# Patient Record
Sex: FEMALE | Race: BLACK OR AFRICAN AMERICAN | Employment: UNEMPLOYED | URBAN - METROPOLITAN AREA
[De-identification: names, ages, dates, MRNs, and addresses within clinical notes are randomized per-mention and may not be internally consistent; named-entity substitution may affect disease eponyms.]

---

## 2021-04-12 ENCOUNTER — APPOINTMENT (OUTPATIENT)
Dept: GENERAL RADIOLOGY | Age: 56
End: 2021-04-12
Attending: PHYSICIAN ASSISTANT

## 2021-04-12 ENCOUNTER — HOSPITAL ENCOUNTER (EMERGENCY)
Age: 56
Discharge: HOME | End: 2021-04-12
Attending: EMERGENCY MEDICINE

## 2021-04-12 VITALS
HEART RATE: 102 BPM | TEMPERATURE: 97.8 F | RESPIRATION RATE: 17 BRPM | SYSTOLIC BLOOD PRESSURE: 134 MMHG | OXYGEN SATURATION: 99 % | DIASTOLIC BLOOD PRESSURE: 94 MMHG

## 2021-04-12 DIAGNOSIS — M25.559 HIP PAIN: ICD-10-CM

## 2021-04-12 DIAGNOSIS — M79.671 RIGHT FOOT PAIN: ICD-10-CM

## 2021-04-12 DIAGNOSIS — M79.641 PAIN OF RIGHT HAND: Primary | ICD-10-CM

## 2021-04-12 DIAGNOSIS — M19.90 ARTHRITIS: ICD-10-CM

## 2021-04-12 PROCEDURE — 73100 X-RAY EXAM OF WRIST: CPT

## 2021-04-12 PROCEDURE — 99281 EMR DPT VST MAYX REQ PHY/QHP: CPT

## 2021-04-12 PROCEDURE — 73502 X-RAY EXAM HIP UNI 2-3 VIEWS: CPT

## 2021-04-12 PROCEDURE — 73630 X-RAY EXAM OF FOOT: CPT

## 2021-04-12 RX ORDER — ACETAMINOPHEN 500 MG
1000 TABLET ORAL
Qty: 20 TAB | Refills: 0 | Status: SHIPPED | OUTPATIENT
Start: 2021-04-12 | End: 2021-05-30

## 2021-04-12 NOTE — ED TRIAGE NOTES
Patient presents to ED with c/o pain to right wrist, hip, and foot. Patient does have contraction to right side following a stroke in 2011. CMS intact to distal extremities.  VSS

## 2021-04-12 NOTE — ED PROVIDER NOTES
EMERGENCY DEPARTMENT HISTORY AND PHYSICAL EXAM    6:38 PM      Date: 4/12/2021  Patient Name: Darinel Strak    History of Presenting Illness     Chief Complaint   Patient presents with    Hand Pain    Hip Pain         History Provided By: Patient    Additional History (Context): Darinel Stark is a 60 y.o. female with hx of CVA and other noted PMH who presents with complaint of right wrist, hip, and foot pain x3 weeks. Patient notes episodic pain since history of CVA in 2011. Patient notes the pain is worse at night, with movement, and when it rains. Pt notes she has tried OTC medication for symptoms without relief. Denies recent fall/trauma, fever/chills, skin discoloration, swelling, numbness/tingling. Notes chronic contracture to RUE since CVA. Notes she ambulates with a cane at baseline. PCP: Dirk Garcia MD    Current Outpatient Medications   Medication Sig Dispense Refill    acetaminophen (Tylenol Extra Strength) 500 mg tablet Take 2 Tabs by mouth every six (6) hours as needed for Pain. 20 Tab 0       Past History     Past Medical History:  No past medical history on file. Past Surgical History:  No past surgical history on file. Family History:  No family history on file. Social History:  Social History     Tobacco Use    Smoking status: Not on file   Substance Use Topics    Alcohol use: Not on file    Drug use: Not on file       Allergies: Allergies   Allergen Reactions    Aggrenox [Aspirin-Dipyridamole] Swelling         Review of Systems       Review of Systems   Constitutional: Negative for chills and fever. Respiratory: Negative for shortness of breath. Cardiovascular: Negative for chest pain. Gastrointestinal: Negative for abdominal pain, nausea and vomiting. Musculoskeletal: Positive for arthralgias, gait problem (chronic) and myalgias. Skin: Negative for rash. Neurological: Negative for weakness. All other systems reviewed and are negative.         Physical Exam Visit Vitals  BP (!) 134/94 (BP 1 Location: Left upper arm, BP Patient Position: At rest)   Pulse (!) 102   Temp 97.8 °F (36.6 °C)   Resp 17   SpO2 99%         Physical Exam  Vitals signs and nursing note reviewed. Constitutional:       General: She is not in acute distress. Appearance: Normal appearance. She is well-developed. She is not ill-appearing, toxic-appearing or diaphoretic. HENT:      Head: Normocephalic and atraumatic. Neck:      Musculoskeletal: Normal range of motion and neck supple. Cardiovascular:      Rate and Rhythm: Normal rate and regular rhythm. Heart sounds: Normal heart sounds. No murmur. No friction rub. No gallop. Pulmonary:      Effort: Pulmonary effort is normal. No respiratory distress. Breath sounds: Normal breath sounds. No wheezing or rales. Musculoskeletal: Normal range of motion. Right hip: Normal.      Right ankle: Normal.      Right lower leg: Normal. No edema. Left lower leg: No edema. Right foot: Normal.      Comments: Dorsalis pedis 2+, radial pulse 2+, sensation intact throughout digits   Skin:     General: Skin is warm. Findings: No rash. Neurological:      Mental Status: She is alert and oriented to person, place, and time. Comments: Contracture of RUE with significant flexion of wrist (baseline), Ambulates with a cane (baseline)           Diagnostic Study Results     Labs -  No results found for this or any previous visit (from the past 12 hour(s)). Radiologic Studies -   XR HIP RT W OR WO PELV 2-3 VWS   Final Result   1. Osteopenia is present. No fracture or dislocation appreciated. If continued   clinical concern for occult fracture or unable to bear weight, MRI is   recommended. XR FOOT RT MIN 3 V   Final Result   1. No evidence of acute fracture or dislocation. Osteopenia is present. If   continued clinical concern, consider follow-up imaging in 10-14 days. XR WRIST RT AP/LAT   Final Result   1. Limited examination due to patient's flexion deformity. However, no gross   fracture or dislocation identified. 2.  Osteopenia is present. If continued clinical concern for occult fracture,   consider follow-up imaging in 10-14 days. Medical Decision Making   I am the first provider for this patient. I reviewed the vital signs, available nursing notes, past medical history, past surgical history, family history and social history. Vital Signs-Reviewed the patient's vital signs. Records Reviewed: Nursing Notes and Old Medical Records (Time of Review: 6:38 PM)    ED Course: Progress Notes, Reevaluation, and Consults:  6:38 PM  Reviewed results with patient. Discussed need for close outpatient follow-up this week for reassessment. Discussed strict return precautions, including swelling, discoloration, or any other medical concerns. Pt in agreement with plan, ready to go home. Provider Notes (Medical Decision Making): 57-year-old female who presents to the ED due to right wrist, hip, and foot pain x3 weeks. Patient notes episodic pain since CVA in 2011. No recent injury or trauma, numbness or tingling, swelling or discoloration. No evidence of cellulitis, septic joint, trauma, fracture. X-rays demonstrate osteopenia and arthritis. Stable for discharge with symptomatic management and close outpatient follow-up with orthopedic for further assessment. Strict return precautions provided. Diagnosis     Clinical Impression:   1. Pain of right hand    2. Hip pain    3. Right foot pain    4.  Arthritis        Disposition: home     Follow-up Information     Follow up With Specialties Details Why [address] [facility] EMERGENCY DEPT Emergency Medicine  If symptoms worsen [address] [address]  [place] 6  Schedule an appointment as soon as possible for a visit   Rosalinda Shea 3  [address] [address] [phone] Discharge Medication List as of 4/12/2021  6:33 PM      START taking these medications    Details   acetaminophen (Tylenol Extra Strength) 500 mg tablet Take 2 Tabs by mouth every six (6) hours as needed for Pain., Normal, Disp-20 Tab, R-0             Dictation disclaimer:  Please note that this dictation was completed with [employer], the computer voice recognition software. Quite often unanticipated grammatical, syntax, homophones, and other interpretive errors are inadvertently transcribed by the computer software. Please disregard these errors. Please excuse any errors that have escaped final proofreading.

## 2021-04-12 NOTE — DISCHARGE INSTRUCTIONS
Take medication as prescribed. Follow-up with your primary care physician within 2 days for reassessment. Bring the results from this visit with you for their review. Return to the ED immediately for any new, worsening, or persistent symptoms.

## 2021-05-30 ENCOUNTER — APPOINTMENT (OUTPATIENT)
Dept: GENERAL RADIOLOGY | Age: 56
DRG: 058 | End: 2021-05-30
Attending: HOSPITALIST
Payer: MEDICAID

## 2021-05-30 ENCOUNTER — HOSPITAL ENCOUNTER (INPATIENT)
Age: 56
LOS: 1 days | Discharge: HOME | DRG: 058 | End: 2021-05-31
Attending: EMERGENCY MEDICINE | Admitting: FAMILY MEDICINE
Payer: MEDICAID

## 2021-05-30 ENCOUNTER — APPOINTMENT (OUTPATIENT)
Dept: CT IMAGING | Age: 56
DRG: 058 | End: 2021-05-30
Attending: EMERGENCY MEDICINE
Payer: MEDICAID

## 2021-05-30 ENCOUNTER — APPOINTMENT (OUTPATIENT)
Dept: GENERAL RADIOLOGY | Age: 56
DRG: 058 | End: 2021-05-30
Attending: EMERGENCY MEDICINE
Payer: MEDICAID

## 2021-05-30 ENCOUNTER — APPOINTMENT (OUTPATIENT)
Dept: MRI IMAGING | Age: 56
DRG: 058 | End: 2021-05-30
Attending: NURSE PRACTITIONER
Payer: MEDICAID

## 2021-05-30 DIAGNOSIS — R47.01 EXPRESSIVE APHASIA: Primary | ICD-10-CM

## 2021-05-30 LAB
ALBUMIN SERPL-MCNC: 3.3 G/DL (ref 3.4–5)
ALBUMIN/GLOB SERPL: 0.6 {RATIO} (ref 0.8–1.7)
ALP SERPL-CCNC: 88 U/L (ref 45–117)
ALT SERPL-CCNC: 17 U/L (ref 13–56)
ANION GAP SERPL CALC-SCNC: 7 MMOL/L (ref 3–18)
AST SERPL-CCNC: 39 U/L (ref 10–38)
BASOPHILS # BLD: 0 K/UL (ref 0–0.1)
BASOPHILS NFR BLD: 0 % (ref 0–2)
BILIRUB SERPL-MCNC: 0.4 MG/DL (ref 0.2–1)
BUN SERPL-MCNC: 4 MG/DL (ref 7–18)
BUN/CREAT SERPL: 6 (ref 12–20)
CALCIUM SERPL-MCNC: 8.2 MG/DL (ref 8.5–10.1)
CHLORIDE SERPL-SCNC: 106 MMOL/L (ref 100–111)
CHOLEST SERPL-MCNC: 115 MG/DL
CO2 SERPL-SCNC: 26 MMOL/L (ref 21–32)
CREAT SERPL-MCNC: 0.71 MG/DL (ref 0.6–1.3)
DIFFERENTIAL METHOD BLD: ABNORMAL
EOSINOPHIL # BLD: 0.1 K/UL (ref 0–0.4)
EOSINOPHIL NFR BLD: 1 % (ref 0–5)
ERYTHROCYTE [DISTWIDTH] IN BLOOD BY AUTOMATED COUNT: 14.6 % (ref 11.6–14.5)
EST. AVERAGE GLUCOSE BLD GHB EST-MCNC: 117 MG/DL
GLOBULIN SER CALC-MCNC: 5.7 G/DL (ref 2–4)
GLUCOSE BLD STRIP.AUTO-MCNC: 140 MG/DL (ref 70–110)
GLUCOSE SERPL-MCNC: 116 MG/DL (ref 74–99)
HBA1C MFR BLD: 5.7 % (ref 4.2–5.6)
HCT VFR BLD AUTO: 36.3 % (ref 35–45)
HDLC SERPL-MCNC: 33 MG/DL (ref 40–60)
HDLC SERPL: 3.5 {RATIO} (ref 0–5)
HGB BLD-MCNC: 11.3 G/DL (ref 12–16)
INR PPP: 0.9 (ref 0.8–1.2)
LDLC SERPL CALC-MCNC: 57 MG/DL (ref 0–100)
LIPID PROFILE,FLP: ABNORMAL
LYMPHOCYTES # BLD: 4 K/UL (ref 0.9–3.6)
LYMPHOCYTES NFR BLD: 51 % (ref 21–52)
MCH RBC QN AUTO: 24.9 PG (ref 24–34)
MCHC RBC AUTO-ENTMCNC: 31.1 G/DL (ref 31–37)
MCV RBC AUTO: 80 FL (ref 74–97)
MONOCYTES # BLD: 0.6 K/UL (ref 0.05–1.2)
MONOCYTES NFR BLD: 8 % (ref 3–10)
NEUTS SEG # BLD: 3.1 K/UL (ref 1.8–8)
NEUTS SEG NFR BLD: 40 % (ref 40–73)
PLATELET # BLD AUTO: 393 K/UL (ref 135–420)
PMV BLD AUTO: 8.7 FL (ref 9.2–11.8)
POTASSIUM SERPL-SCNC: 4.1 MMOL/L (ref 3.5–5.5)
PROT SERPL-MCNC: 9 G/DL (ref 6.4–8.2)
PROTHROMBIN TIME: 12.5 SEC (ref 11.5–15.2)
RBC # BLD AUTO: 4.54 M/UL (ref 4.2–5.3)
SODIUM SERPL-SCNC: 139 MMOL/L (ref 136–145)
TRIGL SERPL-MCNC: 125 MG/DL (ref ?–150)
VLDLC SERPL CALC-MCNC: 25 MG/DL
WBC # BLD AUTO: 7.9 K/UL (ref 4.6–13.2)

## 2021-05-30 PROCEDURE — 82962 GLUCOSE BLOOD TEST: CPT

## 2021-05-30 PROCEDURE — 85610 PROTHROMBIN TIME: CPT

## 2021-05-30 PROCEDURE — 83036 HEMOGLOBIN GLYCOSYLATED A1C: CPT

## 2021-05-30 PROCEDURE — 74019 RADEX ABDOMEN 2 VIEWS: CPT

## 2021-05-30 PROCEDURE — 71045 X-RAY EXAM CHEST 1 VIEW: CPT

## 2021-05-30 PROCEDURE — 93005 ELECTROCARDIOGRAM TRACING: CPT

## 2021-05-30 PROCEDURE — 74011250636 HC RX REV CODE- 250/636: Performed by: EMERGENCY MEDICINE

## 2021-05-30 PROCEDURE — 99285 EMERGENCY DEPT VISIT HI MDM: CPT

## 2021-05-30 PROCEDURE — 94761 N-INVAS EAR/PLS OXIMETRY MLT: CPT

## 2021-05-30 PROCEDURE — 70551 MRI BRAIN STEM W/O DYE: CPT

## 2021-05-30 PROCEDURE — APPSS60 APP SPLIT SHARED TIME 46-60 MINUTES: Performed by: NURSE PRACTITIONER

## 2021-05-30 PROCEDURE — 65270000029 HC RM PRIVATE

## 2021-05-30 PROCEDURE — 70030 X-RAY EYE FOR FOREIGN BODY: CPT

## 2021-05-30 PROCEDURE — 80061 LIPID PANEL: CPT

## 2021-05-30 PROCEDURE — 85025 COMPLETE CBC W/AUTO DIFF WBC: CPT

## 2021-05-30 PROCEDURE — 99223 1ST HOSP IP/OBS HIGH 75: CPT | Performed by: NURSE PRACTITIONER

## 2021-05-30 PROCEDURE — 80053 COMPREHEN METABOLIC PANEL: CPT

## 2021-05-30 PROCEDURE — 80177 DRUG SCRN QUAN LEVETIRACETAM: CPT

## 2021-05-30 PROCEDURE — 70450 CT HEAD/BRAIN W/O DYE: CPT

## 2021-05-30 RX ORDER — POLYETHYLENE GLYCOL 3350 17 G/17G
17 POWDER, FOR SOLUTION ORAL DAILY PRN
Status: DISCONTINUED | OUTPATIENT
Start: 2021-05-30 | End: 2021-05-31 | Stop reason: HOSPADM

## 2021-05-30 RX ORDER — SODIUM CHLORIDE 0.9 % (FLUSH) 0.9 %
5-40 SYRINGE (ML) INJECTION AS NEEDED
Status: DISCONTINUED | OUTPATIENT
Start: 2021-05-30 | End: 2021-05-31 | Stop reason: HOSPADM

## 2021-05-30 RX ORDER — SODIUM CHLORIDE 0.9 % (FLUSH) 0.9 %
5-40 SYRINGE (ML) INJECTION EVERY 8 HOURS
Status: DISCONTINUED | OUTPATIENT
Start: 2021-05-30 | End: 2021-05-31 | Stop reason: HOSPADM

## 2021-05-30 RX ORDER — FAMOTIDINE 20 MG/1
20 TABLET, FILM COATED ORAL DAILY
Status: DISCONTINUED | OUTPATIENT
Start: 2021-05-31 | End: 2021-05-31 | Stop reason: HOSPADM

## 2021-05-30 RX ORDER — LEVETIRACETAM 10 MG/ML
1000 INJECTION INTRAVASCULAR EVERY 12 HOURS
Status: DISCONTINUED | OUTPATIENT
Start: 2021-05-30 | End: 2021-05-31 | Stop reason: HOSPADM

## 2021-05-30 RX ORDER — LANOLIN ALCOHOL/MO/W.PET/CERES
65 CREAM (GRAM) TOPICAL EVERY OTHER DAY
Status: ON HOLD | COMMUNITY
End: 2021-05-31 | Stop reason: SDUPTHER

## 2021-05-30 RX ORDER — ATORVASTATIN CALCIUM 40 MG/1
40 TABLET, FILM COATED ORAL DAILY
Status: ON HOLD | COMMUNITY
End: 2021-05-31 | Stop reason: SDUPTHER

## 2021-05-30 RX ORDER — CLOPIDOGREL BISULFATE 75 MG/1
75 TABLET ORAL DAILY
Status: ON HOLD | COMMUNITY
End: 2021-05-31 | Stop reason: SDUPTHER

## 2021-05-30 RX ORDER — CLOPIDOGREL BISULFATE 75 MG/1
75 TABLET ORAL DAILY
Status: DISCONTINUED | OUTPATIENT
Start: 2021-05-31 | End: 2021-05-31 | Stop reason: HOSPADM

## 2021-05-30 RX ORDER — ONDANSETRON 2 MG/ML
4 INJECTION INTRAMUSCULAR; INTRAVENOUS
Status: DISCONTINUED | OUTPATIENT
Start: 2021-05-30 | End: 2021-05-31 | Stop reason: HOSPADM

## 2021-05-30 RX ORDER — ASPIRIN 81 MG/1
81 TABLET ORAL DAILY
Status: ON HOLD | COMMUNITY
End: 2021-05-31 | Stop reason: SDUPTHER

## 2021-05-30 RX ORDER — GUAIFENESIN 100 MG/5ML
81 LIQUID (ML) ORAL DAILY
Status: DISCONTINUED | OUTPATIENT
Start: 2021-05-31 | End: 2021-05-31 | Stop reason: HOSPADM

## 2021-05-30 RX ORDER — ACETAMINOPHEN 325 MG/1
650 TABLET ORAL
Status: DISCONTINUED | OUTPATIENT
Start: 2021-05-30 | End: 2021-05-31 | Stop reason: HOSPADM

## 2021-05-30 RX ORDER — LISINOPRIL 20 MG/1
20 TABLET ORAL DAILY
Status: ON HOLD | COMMUNITY
End: 2021-05-31 | Stop reason: SDUPTHER

## 2021-05-30 RX ORDER — DOCUSATE SODIUM 100 MG/1
100 CAPSULE, LIQUID FILLED ORAL DAILY
Status: DISCONTINUED | OUTPATIENT
Start: 2021-05-31 | End: 2021-05-31 | Stop reason: HOSPADM

## 2021-05-30 RX ORDER — AMLODIPINE BESYLATE 10 MG/1
TABLET ORAL DAILY
Status: ON HOLD | COMMUNITY
End: 2021-05-31 | Stop reason: SDUPTHER

## 2021-05-30 RX ORDER — PROMETHAZINE HYDROCHLORIDE 12.5 MG/1
12.5 TABLET ORAL
Status: DISCONTINUED | OUTPATIENT
Start: 2021-05-30 | End: 2021-05-31 | Stop reason: HOSPADM

## 2021-05-30 RX ORDER — ACETAMINOPHEN 650 MG/1
650 SUPPOSITORY RECTAL
Status: DISCONTINUED | OUTPATIENT
Start: 2021-05-30 | End: 2021-05-31 | Stop reason: HOSPADM

## 2021-05-30 RX ORDER — ATORVASTATIN CALCIUM 40 MG/1
40 TABLET, FILM COATED ORAL
Status: DISCONTINUED | OUTPATIENT
Start: 2021-05-30 | End: 2021-05-31 | Stop reason: HOSPADM

## 2021-05-30 RX ORDER — ENOXAPARIN SODIUM 100 MG/ML
40 INJECTION SUBCUTANEOUS EVERY 24 HOURS
Status: DISCONTINUED | OUTPATIENT
Start: 2021-05-30 | End: 2021-05-31 | Stop reason: HOSPADM

## 2021-05-30 RX ORDER — LEVETIRACETAM 500 MG/1
500 TABLET ORAL 2 TIMES DAILY
Status: ON HOLD | COMMUNITY
End: 2021-05-31 | Stop reason: SDUPTHER

## 2021-05-30 RX ADMIN — LEVETIRACETAM 1000 MG: 1000 INJECTION, SOLUTION INTRAVENOUS at 18:11

## 2021-05-30 NOTE — Clinical Note
Status[de-identified] INPATIENT [101]   Type of Bed: Neuro/Stroke [9]   Cardiac Monitoring Required?: No   Inpatient Hospitalization Certified Necessary for the Following Reasons: 3.  Patient receiving treatment that can only be provided in an inpatient setting (further clarification in H&P documentation)   Admitting Diagnosis: Expressive aphasia [278112]   Admitting Physician: Gio Hdez [[id number]]   Attending Physician: Gio Hdez [[id number]]   Estimated Length of Stay: 2 Midnights   Discharge Plan[de-identified] Home with Office Follow-up

## 2021-05-30 NOTE — ED PROVIDER NOTES
EMERGENCY DEPARTMENT HISTORY AND PHYSICAL EXAM  This was created with voice recognition software and transcription errors may be present. 4:43 PM  Date: 5/30/2021  Patient Name: Mai Barnes    History of Presenting Illness     Chief Complaint:    History Provided By:     HPI: Mai Barnes is a 60 y.o. female past medical history of stroke who presents for choking. Patient had a stroke is unclear exactly when based on chart review but comes in with a history of hemiparesis and had an episode of choking on Jell-O earlier today. Patient reportedly speaks clearly and since the choking episode has had significantly slurred speech. No other secondary signs of stroke no blurred vision or coordination problems. She states she does not feel like something is stuck in her throat but states she is having trouble getting her words out. Per chart review stroke was in 2011    PCP: Jose, MD Dirk      Past History     Past Medical History:  No past medical history on file. Past Surgical History:  No past surgical history on file. Family History:  No family history on file. Social History:  Social History     Tobacco Use    Smoking status: Not on file   Substance Use Topics    Alcohol use: Not on file    Drug use: Not on file       Allergies: Allergies   Allergen Reactions    Aggrenox [Aspirin-Dipyridamole] Swelling       Review of Systems     Review of Systems   Eyes: Negative for pain. Respiratory: Negative for apnea. All other systems reviewed and are negative. 10 point review of systems otherwise negative unless noted in HPI. Physical Exam       Physical Exam  Constitutional:       Appearance: She is well-developed. HENT:      Head: Normocephalic. Mouth/Throat:      Mouth: Mucous membranes are moist.   Eyes:      Conjunctiva/sclera: Conjunctivae normal.   Cardiovascular:      Rate and Rhythm: Normal rate. Heart sounds: Normal heart sounds.    Abdominal:      Palpations: Abdomen is soft. Musculoskeletal:      Cervical back: Normal range of motion. Skin:     General: Skin is warm. Neurological:      Comments: Patient with right-sided hemiparesis with a right upper extremity contracture. Her speech is slurred although not cranial nerves grossly intact coordination grossly intact on the left strength 5 out of 5 on the left         Diagnostic Study Results     Vital Signs   Visit Vitals  BP (!) 148/81   Pulse (!) 105   Temp 97.8 °F (36.6 °C)   Resp 20   Ht 5' 6\" (1.676 m)   Wt 77.1 kg (170 lb)   SpO2 98%   BMI 27.44 kg/m²      EKG: Ending  Labs: CBC is unremarkable INR is 0.9 chemistry unremarkable  Imaging: CT head with encephalomalacia  Chest x-ray no acute pulmonary disease  Medical Decision Making     ED Course: Progress Notes, Reevaluation, and Consults:    I will be the provider of record for this patient. Provider Notes (Medical Decision Making): This is a 49-year-old female who presents with choking etiology is unclear initially it sounded like a general complaint however as her speech is not slurred following the event may in fact be a stroke. She does appear to be on Aggrenox per chart review additionally last known well time is not known but per son's report from nursing she was speaking fine this morning. Will discuss with teleneurology is slightly unusual that there will be no secondary effects. She is noted to be tachycardic    5:35 PM about the last 30 minutes to 45 minutes trying to figure out patient's history. We have called the son is on his way and he should be here shortly. She states she is on blood thinners but were not sure exactly which ones. She does not know. Son states she did not have a hemorrhagic stroke in the past.  Her onset time seems to be around  3:00. Currently working with neurology to try to determine if patient is eligible for TPA.     Son arrived and he did not have a med list on him however I do he was able to give me the pharmacy number where they filled her prescriptions in [facility]. I called the pharmacy in [facility] and found out that she was on Plavix and she had it filled in March in addition she was on a statin she had that filled in February but she is not on any other blood thinners. We discussed this further with the son who states this is probably happened 2 or 3 times in the past patient states this is probably actually happened 3 times in the past and each time she gets better the following morning given this we discussed the risk of benefit and harm of TPA with the patient and her son she has expressive aphasia but   . Clearly understands are speaking and is able to answer in writing and given this we will hold on the TPA at this time. Critical Care Time:  The services I provided to this patient were to treat and/or prevent clinically significant deterioration that could result in the failure of one or more body systems and/or organ systems due to CVA. Services included the following:  -reviewing nursing notes and old charts  -vital sign assessments  -direct patient care  -medication orders and management  -interpreting and reviewing diagnostic studies/labs  -re-evaluations  -documentation time    Aggregate critical care time was 60 minutes, which includes only time during which I was engaged in work directly related to the patient's care as described above, whether I was at bedside or elsewhere in the Emergency Department. It did not include time spent performing other reported procedures or the services of residents, students, nurses, or advance practice providers. Alicia Boateng MD    5:56 PM      Diagnosis     Clinical Impression: No diagnosis found. Disposition:        Patient's Medications   Start Taking    No medications on file   Continue Taking    ACETAMINOPHEN (TYLENOL EXTRA STRENGTH) 500 MG TABLET    Take 2 Tabs by mouth every six (6) hours as needed for Pain.    These Medications have changed    No medications on file   Stop Taking    No medications on file

## 2021-05-30 NOTE — ED TRIAGE NOTES
Per medic: Son called stating patient was eating jello and started choking on it and is now complaining of a sore throat. Yuniel Angry Upon arrival to the ER patient does not appear to be in any distress.

## 2021-05-30 NOTE — ED NOTES
Pt speech noted to be abnormal. Per medic: \"Patient communicates normally as we would, patient normally talks in full, clear sentences.  She was talking in the ambulance\"

## 2021-05-30 NOTE — H&P
History & Physical    Patient: Tanja Gutierrez MRN: [id number]  CSN: [id number]    YOB: 1965  Age: 60 y.o. Sex: female      DOA: 5/30/2021  CC: choking     PCP: Dirk Garcia MD       HPI:     Tanja Gutierrez is a 60 y.o. female who presents with chocking today while eating jello. After chocking complained of sore throat. Slurred speech noted after episode. History of stroke, Plavix/statin/ASA and right side hemiparesis. History of seizures. Currently aphasia, she is having difficulties with forming her words. Complains of constipation. She does not know the name of her medications. Family is at the bedside. He was able to provide the pharmacy contact information. Just relocated from [facility]. ROS: alert and oriented x3. Aphasia   GENERAL: no weight loss, no falls. HEENT: chocking and difficulty swallowing    NECK: No pain or stiffness. PULMONARY: No shortness of breath, no cough or wheeze. Cardiovascular: no pnd or orthopnea, no CP  GASTROINTESTINAL: right side abdominal pain, constipation GENITOURINARY: No urinary frequency, no urgency, no hesitancy or dysuria. MUSCULOSKELETAL: right side weakness  DERMATOLOGIC: No rash, no itching, no lesions. ENDOCRINE: No polyuria, no polydipsia, no heat or cold intolerance. No recent change in weight. HEMATOLOGICAL: No anemia or easy bruising or bleeding. NEUROLOGIC: No headache, no seizures, no numbness, no tingling or weakness. Past Medical History:   Diagnosis Date    [facility]     HLD (hyperlipidemia)     HTN (hypertension)     Right hemiparesis ([facility]     Seizure ([facility]        No past surgical history on file. No family history on file.     Social History     Socioeconomic History    Marital status: SINGLE     Spouse name: Not on file    Number of children: Not on file    Years of education: Not on file    Highest education level: Not on file     Social Determinants of Health Financial Resource Strain:     Difficulty of Paying Living Expenses:    Food Insecurity:     Worried About Running Out of Food in the Last Year:     [address] in the Last Year:    Transportation Needs:     Lack of Transportation (Medical):  Lack of Transportation (Non-Medical):    Physical Activity:     Days of Exercise per Week:     Minutes of Exercise per Session:    Stress:     Feeling of Stress :    Social Connections:     Frequency of Communication with Friends and Family:     Frequency of Social Gatherings with Friends and Family:     Attends Congregation Services:     Active Member of Clubs or Organizations:     Attends Club or Organization Meetings:     Marital Status:        Prior to Admission medications    Medication Sig Start Date End Date Taking? Authorizing Provider   clopidogreL (Plavix) 75 mg tab Take 75 mg by mouth daily. Yes Provider, Historical   atorvastatin (LIPITOR) 40 mg tablet Take 40 mg by mouth daily. Yes Provider, Historical   lisinopriL (PRINIVIL, ZESTRIL) 20 mg tablet Take 20 mg by mouth daily. Yes Provider, Historical   amLODIPine (NORVASC) 10 mg tablet Take  by mouth daily. Yes Provider, Historical   ferrous sulfate 325 mg (65 mg iron) tablet Take 65 mg by mouth every other day. Yes Provider, Historical   levETIRAcetam (Keppra) 500 mg tablet Take 500 mg by mouth two (2) times a day. Yes Provider, Historical   aspirin delayed-release 81 mg tablet Take 81 mg by mouth daily.    Yes Provider, Historical       Allergies   Allergen Reactions    Aggrenox [Aspirin-Dipyridamole] Swelling              Physical Exam:      Visit Vitals  /87   Pulse 91   Temp 97.8 °F (36.6 °C)   Resp 23   Ht 5' 6\" (1.676 m)   Wt 77.1 kg (170 lb)   SpO2 100%   BMI 27.44 kg/m²       Physical Exam:  General:  Alert, NAD  Cardiovascular:  Tachycardia   Pulmonary:  LSC throughout; respiratory effort WNL  GI: Right side tender on palpation    Extremities: right side hemiparesis No edema; 2+ dorsalis pedis pulses bilaterally  Neuro: alert and oriented x3, aphasia, slurred speech     Lab/Data Review:  Labs: Results:       Chemistry Recent Labs     05/30/21  1644   *      K 4.1      CO2 26   BUN 4*   CREA 0.71   CA 8.2*   AGAP 7   BUCR 6*   AP 88   TP 9.0*   ALB 3.3*   GLOB 5.7*   AGRAT 0.6*      CBC w/Diff Recent Labs     05/30/21  1644   WBC 7.9   RBC 4.54   HGB 11.3*   HCT 36.3      GRANS 40   LYMPH 51   EOS 1      Coagulation Recent Labs     05/30/21  1644   PTP 12.5   INR 0.9       Iron/Ferritin No results for input(s): IRON in the last 72 hours. No lab exists for component: TIBCCALC   BNP No results for input(s): BNPP in the last 72 hours. Cardiac Enzymes No results for input(s): CPK, CKND1, DAQUAN in the last 72 hours. No lab exists for component: CKRMB, TROIP   Liver Enzymes Recent Labs     05/30/21  1644   TP 9.0*   ALB 3.3*   AP 88      Thyroid Studies No results found for: T4, T3U, TSH, TSHEXT, TSHEXT       All Micro Results     None          Imaging Reviewed:  CT Results (most recent):  Results from Hospital Encounter encounter on 05/30/21    CT HEAD WO CONT    Narrative  NONCONTRAST HEAD CT    INDICATION: Above. Stroke alert protocol head CT. Slurred speech and weakness. History of CVA. COMPARISON: No priors available in PACS    TECHNIQUE: Serial axial CT images through the brain were obtained without  administration of intravenous contrast. Additional coronal and sagittal  reformation images were also performed. All CT scans at this facility are performed using dose optimization technique as  appropriate to the performed exam, to include automated exposure control,  adjustment of the mA and/or kV according to patient's size (Including  appropriate matching for site-specific examinations), or use of iterative  reconstruction technique.     FINDINGS:    Large region of encephalomalacia in the left cerebrum centered in the  perisylvian region consistent with chronic infarct in the MCA territory. Mild  associated ex vacuo dilatation of the left lateral ventricle. The sulci and ventricles otherwise are within normal limits for age in size and  configuration. There is no evidence of acute intracranial hemorrhage. No edema, midline shift or other mass effect is seen. No mass lesion  identified. No evidence of acute ischemic stroke/ cerebrovascular accident (CVA) is  identified. Head CT is often insensitive early to acute ischemic stroke. The visualized portions of the paranasal sinuses demonstrate no significant  mucosal pathology. The mastoid air cells are aerated  The calvarium appears  intact. Impression  No CT evidence of acute intracranial pathology. Large region of encephalomalacia in the left cerebrum consistent with chronic  infarct in the MCA territory. I have telephoned a wet reading directly to Dr. Devang Quigley  at 5:07 PM on 5/30/2021. Assessment:   Active Problems:    Expressive aphasia (5/30/2021)    Dysphagia     Past medical history   Seizure  HTN  HLD  CVA  Right side hemiparesis       Plan:   1. Stroke protocol initiated. CT head completed. MRI head ordered. Continue ASA/Plavix/Statin. Hold BP medications. Permissive BP. Neurology consult  2. In ER Keppra IV ordered. Continue IV Keppra. Transition to PO home dose once she passes swallow eval. Seizure precautions   3. Pt/OT/Speech  4. Monitor metabolic panel and renal function   5. Bowel regimen   6. [facility] [phone]. Called to verify medication list and updated med rec  7. Full code  8.  Designated  theron Bryson [phone]            CELY [id number], ABBIE  5/30/2021, 6:58 PM

## 2021-05-31 VITALS
HEIGHT: 66 IN | WEIGHT: 169.97 LBS | BODY MASS INDEX: 27.32 KG/M2 | DIASTOLIC BLOOD PRESSURE: 87 MMHG | HEART RATE: 85 BPM | SYSTOLIC BLOOD PRESSURE: 124 MMHG | TEMPERATURE: 98.4 F | RESPIRATION RATE: 18 BRPM | OXYGEN SATURATION: 96 %

## 2021-05-31 LAB
ALBUMIN SERPL-MCNC: 3.2 G/DL (ref 3.4–5)
ALBUMIN/GLOB SERPL: 0.6 {RATIO} (ref 0.8–1.7)
ALP SERPL-CCNC: 79 U/L (ref 45–117)
ALT SERPL-CCNC: 15 U/L (ref 13–56)
ANION GAP SERPL CALC-SCNC: 5 MMOL/L (ref 3–18)
AST SERPL-CCNC: 23 U/L (ref 10–38)
ATRIAL RATE: 92 BPM
BASOPHILS # BLD: 0 K/UL (ref 0–0.1)
BASOPHILS NFR BLD: 0 % (ref 0–2)
BILIRUB SERPL-MCNC: 0.7 MG/DL (ref 0.2–1)
BUN SERPL-MCNC: 4 MG/DL (ref 7–18)
BUN/CREAT SERPL: 7 (ref 12–20)
CALCIUM SERPL-MCNC: 8.8 MG/DL (ref 8.5–10.1)
CALCULATED P AXIS, ECG09: 41 DEGREES
CALCULATED R AXIS, ECG10: 17 DEGREES
CALCULATED T AXIS, ECG11: 45 DEGREES
CHLORIDE SERPL-SCNC: 104 MMOL/L (ref 100–111)
CO2 SERPL-SCNC: 27 MMOL/L (ref 21–32)
CREAT SERPL-MCNC: 0.58 MG/DL (ref 0.6–1.3)
DIAGNOSIS, 93000: NORMAL
DIFFERENTIAL METHOD BLD: ABNORMAL
EOSINOPHIL # BLD: 0.1 K/UL (ref 0–0.4)
EOSINOPHIL NFR BLD: 1 % (ref 0–5)
ERYTHROCYTE [DISTWIDTH] IN BLOOD BY AUTOMATED COUNT: 14.6 % (ref 11.6–14.5)
GLOBULIN SER CALC-MCNC: 5.6 G/DL (ref 2–4)
GLUCOSE BLD STRIP.AUTO-MCNC: 87 MG/DL (ref 70–110)
GLUCOSE SERPL-MCNC: 85 MG/DL (ref 74–99)
HCT VFR BLD AUTO: 34.3 % (ref 35–45)
HGB BLD-MCNC: 10.7 G/DL (ref 12–16)
LYMPHOCYTES # BLD: 3.6 K/UL (ref 0.9–3.6)
LYMPHOCYTES NFR BLD: 47 % (ref 21–52)
MCH RBC QN AUTO: 25.1 PG (ref 24–34)
MCHC RBC AUTO-ENTMCNC: 31.2 G/DL (ref 31–37)
MCV RBC AUTO: 80.3 FL (ref 74–97)
MONOCYTES # BLD: 0.7 K/UL (ref 0.05–1.2)
MONOCYTES NFR BLD: 10 % (ref 3–10)
NEUTS SEG # BLD: 3.1 K/UL (ref 1.8–8)
NEUTS SEG NFR BLD: 41 % (ref 40–73)
P-R INTERVAL, ECG05: 190 MS
PLATELET # BLD AUTO: 361 K/UL (ref 135–420)
PMV BLD AUTO: 9.1 FL (ref 9.2–11.8)
POTASSIUM SERPL-SCNC: 3.4 MMOL/L (ref 3.5–5.5)
PROT SERPL-MCNC: 8.8 G/DL (ref 6.4–8.2)
Q-T INTERVAL, ECG07: 390 MS
QRS DURATION, ECG06: 72 MS
QTC CALCULATION (BEZET), ECG08: 482 MS
RBC # BLD AUTO: 4.27 M/UL (ref 4.2–5.3)
SODIUM SERPL-SCNC: 136 MMOL/L (ref 136–145)
VENTRICULAR RATE, ECG03: 92 BPM
WBC # BLD AUTO: 7.5 K/UL (ref 4.6–13.2)

## 2021-05-31 PROCEDURE — 99238 HOSP IP/OBS DSCHRG MGMT 30/<: CPT | Performed by: FAMILY MEDICINE

## 2021-05-31 PROCEDURE — 2709999900 HC NON-CHARGEABLE SUPPLY

## 2021-05-31 PROCEDURE — 74011250636 HC RX REV CODE- 250/636: Performed by: EMERGENCY MEDICINE

## 2021-05-31 PROCEDURE — 92522 EVALUATE SPEECH PRODUCTION: CPT

## 2021-05-31 PROCEDURE — 80053 COMPREHEN METABOLIC PANEL: CPT

## 2021-05-31 PROCEDURE — 36415 COLL VENOUS BLD VENIPUNCTURE: CPT

## 2021-05-31 PROCEDURE — 74011250637 HC RX REV CODE- 250/637: Performed by: NURSE PRACTITIONER

## 2021-05-31 PROCEDURE — 85025 COMPLETE CBC W/AUTO DIFF WBC: CPT

## 2021-05-31 PROCEDURE — 99223 1ST HOSP IP/OBS HIGH 75: CPT | Performed by: PSYCHIATRY & NEUROLOGY

## 2021-05-31 PROCEDURE — 92610 EVALUATE SWALLOWING FUNCTION: CPT

## 2021-05-31 PROCEDURE — 97535 SELF CARE MNGMENT TRAINING: CPT

## 2021-05-31 PROCEDURE — 97161 PT EVAL LOW COMPLEX 20 MIN: CPT

## 2021-05-31 PROCEDURE — 97165 OT EVAL LOW COMPLEX 30 MIN: CPT

## 2021-05-31 PROCEDURE — 74011250636 HC RX REV CODE- 250/636: Performed by: NURSE PRACTITIONER

## 2021-05-31 PROCEDURE — 82962 GLUCOSE BLOOD TEST: CPT

## 2021-05-31 PROCEDURE — 74011250637 HC RX REV CODE- 250/637: Performed by: FAMILY MEDICINE

## 2021-05-31 RX ORDER — LEVETIRACETAM 500 MG/1
500 TABLET ORAL 2 TIMES DAILY
Qty: 60 TABLET | Refills: 0 | Status: SHIPPED | OUTPATIENT
Start: 2021-05-31 | End: 2021-06-28 | Stop reason: SDUPTHER

## 2021-05-31 RX ORDER — ASPIRIN 81 MG/1
81 TABLET ORAL DAILY
Qty: 30 TABLET | Refills: 0 | Status: SHIPPED | OUTPATIENT
Start: 2021-05-31 | End: 2021-06-28 | Stop reason: SDUPTHER

## 2021-05-31 RX ORDER — POTASSIUM CHLORIDE 1500 MG/1
20 TABLET, FILM COATED, EXTENDED RELEASE ORAL 2 TIMES DAILY
Qty: 10 TABLET | Refills: 0 | Status: SHIPPED | OUTPATIENT
Start: 2021-05-31 | End: 2021-06-05

## 2021-05-31 RX ORDER — CLOPIDOGREL BISULFATE 75 MG/1
75 TABLET ORAL DAILY
Qty: 30 TABLET | Refills: 0 | Status: SHIPPED | OUTPATIENT
Start: 2021-05-31 | End: 2021-06-28 | Stop reason: SDUPTHER

## 2021-05-31 RX ORDER — LANOLIN ALCOHOL/MO/W.PET/CERES
325 CREAM (GRAM) TOPICAL EVERY OTHER DAY
Qty: 30 TABLET | Refills: 0 | Status: SHIPPED | OUTPATIENT
Start: 2021-05-31 | End: 2021-06-28 | Stop reason: SDUPTHER

## 2021-05-31 RX ORDER — LISINOPRIL 20 MG/1
20 TABLET ORAL DAILY
Qty: 30 TABLET | Refills: 0 | Status: SHIPPED | OUTPATIENT
Start: 2021-05-31 | End: 2021-06-28 | Stop reason: SDUPTHER

## 2021-05-31 RX ORDER — ATORVASTATIN CALCIUM 40 MG/1
40 TABLET, FILM COATED ORAL DAILY
Qty: 30 TABLET | Refills: 0 | Status: SHIPPED | OUTPATIENT
Start: 2021-05-31 | End: 2021-06-28 | Stop reason: SDUPTHER

## 2021-05-31 RX ORDER — AMLODIPINE BESYLATE 10 MG/1
10 TABLET ORAL DAILY
Qty: 30 TABLET | Refills: 0 | Status: SHIPPED | OUTPATIENT
Start: 2021-05-31 | End: 2021-06-28 | Stop reason: SDUPTHER

## 2021-05-31 RX ADMIN — ATORVASTATIN CALCIUM 40 MG: 40 TABLET, FILM COATED ORAL at 00:40

## 2021-05-31 RX ADMIN — ENOXAPARIN SODIUM 40 MG: 40 INJECTION SUBCUTANEOUS at 00:40

## 2021-05-31 RX ADMIN — LEVETIRACETAM 1000 MG: 1000 INJECTION, SOLUTION INTRAVENOUS at 06:21

## 2021-05-31 RX ADMIN — Medication 10 ML: at 06:22

## 2021-05-31 RX ADMIN — FAMOTIDINE 20 MG: 20 TABLET ORAL at 08:29

## 2021-05-31 RX ADMIN — Medication 10 ML: at 00:40

## 2021-05-31 RX ADMIN — POTASSIUM BICARBONATE 40 MEQ: 782 TABLET, EFFERVESCENT ORAL at 12:22

## 2021-05-31 RX ADMIN — CLOPIDOGREL BISULFATE 75 MG: 75 TABLET ORAL at 08:29

## 2021-05-31 RX ADMIN — DOCUSATE SODIUM 100 MG: 100 CAPSULE ORAL at 08:29

## 2021-05-31 RX ADMIN — ASPIRIN 81 MG: 81 TABLET, CHEWABLE ORAL at 08:29

## 2021-05-31 NOTE — PROGRESS NOTES
0130: Assumed patient care from [address]. Patients alert and oriented to person, place, time and situation. Respiratory status is stable on room air. Vital signs are stable. NIH score is a six. MEWS score is a one. Patient denies any pain, discomfort, nausea vomiting dizziness or anxiety. White board and fall card is updated. Bed is locked and in lowest position. Call bell, water and personal belongings are within reach. Patient has no questions, comments or concerns after bedside shift report. 0200: Patient's neuro assessment is negative for any changes in status. Her NIH score is a seven. 0400: Patient's neuro assessment is negative for any changes in status. Her NIH score remains a seven. 0600: Patient's neuro assessment is negative for any changes in status. Her NIH score remains a seven. 0700: Patient had an uneventful shift. Respiratory status, vital signs and MEWS score remained stable. Patient was resting quietly with no signs of distress noted. Bed locked and in lowest position. Call bell water and personal belongings were within reach. Patient  had no questions, comments or concerns after bedside shift report.  Bedside report given to Mariam Grant R.N.

## 2021-05-31 NOTE — PROGRESS NOTES
[facility]  Progress Note    Patient: Fallon Chowdhury Age: 60 y.o. : 1965 MR#: [id number] SSN: [id number]  Date: 2021     Subjective/24-hour events:     No new complaints. Assessment:   TIA v CVA  Hx prior CVA with expressive aphasia and R hemiparesis  Hypokalemia  HTN  HLD    Plan:   Neurology evaluation. Replace potassium orally. Home today pending neurology eval.    Case discussed with:  [x]Patient  []Family  [x]Nursing  []Case Management  DVT Prophylaxis:  []Lovenox  []Hep SQ  []SCDs  []Coumadin   []On Heparin gtt    Objective:   VS:   Visit Vitals  /87   Pulse 85   Temp 98.4 °F (36.9 °C)   Resp 18   Ht 5' 6\" (1.676 m)   Wt 77.1 kg (169 lb 15.6 oz)   SpO2 96%   Breastfeeding No   BMI 27.43 kg/m²      Tmax/24hrs: Temp (24hrs), Av.9 °F (36.6 °C), Min:97.2 °F (36.2 °C), Max:98.4 °F (36.9 °C)      Intake/Output Summary (Last 24 hours) at 2021 1024  Last data filed at 2021 0130  Gross per 24 hour   Intake 0 ml   Output 0 ml   Net 0 ml       General:  In NAD. Nontoxic-appearing. Cardiovascular:  RRR. Pulmonary:  Lungs clear bilaterally, no wheezes. No accessory muscle use. GI:  Abdomen soft, NTTP. Extremities:  Warm, no edema or ischemia. Neuro:  Awake and alert.   Moves extremities spontaneously, motor grossly nonfocal.    Labs:    Recent Results (from the past 24 hour(s))   GLUCOSE, POC    Collection Time: 21  4:42 PM   Result Value Ref Range    Glucose (POC) 140 (H) 70 - 110 mg/dL   CBC WITH AUTOMATED DIFF    Collection Time: 21  4:44 PM   Result Value Ref Range    WBC 7.9 4.6 - 13.2 K/uL    RBC 4.54 4.20 - 5.30 M/uL    HGB 11.3 (L) 12.0 - 16.0 g/dL    HCT 36.3 35.0 - 45.0 %    MCV 80.0 74.0 - 97.0 FL    MCH 24.9 24.0 - 34.0 PG    MCHC 31.1 31.0 - 37.0 g/dL    RDW 14.6 (H) 11.6 - 14.5 %    PLATELET 748 279 - 047 K/uL    MPV 8.7 (L) 9.2 - 11.8 FL    NEUTROPHILS 40 40 - 73 %    LYMPHOCYTES 51 21 - 52 %    MONOCYTES 8 3 - 10 %    EOSINOPHILS 1 0 - 5 %    BASOPHILS 0 0 - 2 %    ABS. NEUTROPHILS 3.1 1.8 - 8.0 K/UL    ABS. LYMPHOCYTES 4.0 (H) 0.9 - 3.6 K/UL    ABS. MONOCYTES 0.6 0.05 - 1.2 K/UL    ABS. EOSINOPHILS 0.1 0.0 - 0.4 K/UL    ABS. BASOPHILS 0.0 0.0 - 0.1 K/UL    DF AUTOMATED     METABOLIC PANEL, COMPREHENSIVE    Collection Time: 05/30/21  4:44 PM   Result Value Ref Range    Sodium 139 136 - 145 mmol/L    Potassium 4.1 3.5 - 5.5 mmol/L    Chloride 106 100 - 111 mmol/L    CO2 26 21 - 32 mmol/L    Anion gap 7 3.0 - 18 mmol/L    Glucose 116 (H) 74 - 99 mg/dL    BUN 4 (L) 7.0 - 18 MG/DL    Creatinine 0.71 0.6 - 1.3 MG/DL    BUN/Creatinine ratio 6 (L) 12 - 20      GFR est AA >60 >60 ml/min/1.73m2    GFR est non-AA >60 >60 ml/min/1.73m2    Calcium 8.2 (L) 8.5 - 10.1 MG/DL    Bilirubin, total 0.4 0.2 - 1.0 MG/DL    ALT (SGPT) 17 13 - 56 U/L    AST (SGOT) 39 (H) 10 - 38 U/L    Alk.  phosphatase 88 45 - 117 U/L    Protein, total 9.0 (H) 6.4 - 8.2 g/dL    Albumin 3.3 (L) 3.4 - 5.0 g/dL    Globulin 5.7 (H) 2.0 - 4.0 g/dL    A-G Ratio 0.6 (L) 0.8 - 1.7     PROTHROMBIN TIME + INR    Collection Time: 05/30/21  4:44 PM   Result Value Ref Range    Prothrombin time 12.5 11.5 - 15.2 sec    INR 0.9 0.8 - 1.2     LIPID PANEL    Collection Time: 05/30/21  4:44 PM   Result Value Ref Range    LIPID PROFILE          Cholesterol, total 115 <200 MG/DL    Triglyceride 125 <150 MG/DL    HDL Cholesterol 33 (L) 40 - 60 MG/DL    LDL, calculated 57 0 - 100 MG/DL    VLDL, calculated 25 MG/DL    CHOL/HDL Ratio 3.5 0 - 5.0     HEMOGLOBIN A1C WITH EAG    Collection Time: 05/30/21  4:44 PM   Result Value Ref Range    Hemoglobin A1c 5.7 (H) 4.2 - 5.6 %    Est. average glucose 117 mg/dL   EKG, 12 LEAD, INITIAL    Collection Time: 05/30/21  5:04 PM   Result Value Ref Range    Ventricular Rate 92 BPM    Atrial Rate 92 BPM    P-R Interval 190 ms    QRS Duration 72 ms    Q-T Interval 390 ms    QTC Calculation (Bezet) 482 ms    Calculated P Axis 41 degrees    Calculated R Axis 17 degrees    Calculated T Axis 45 degrees    Diagnosis       Normal sinus rhythm  Prolonged QT  Abnormal ECG  No previous ECGs available  Confirmed by Snehal Bob MD, ----- [id number]) on 5/31/2021 9:31:50 AM     CBC WITH AUTOMATED DIFF    Collection Time: 05/31/21  4:04 AM   Result Value Ref Range    WBC 7.5 4.6 - 13.2 K/uL    RBC 4.27 4.20 - 5.30 M/uL    HGB 10.7 (L) 12.0 - 16.0 g/dL    HCT 34.3 (L) 35.0 - 45.0 %    MCV 80.3 74.0 - 97.0 FL    MCH 25.1 24.0 - 34.0 PG    MCHC 31.2 31.0 - 37.0 g/dL    RDW 14.6 (H) 11.6 - 14.5 %    PLATELET 066 174 - 473 K/uL    MPV 9.1 (L) 9.2 - 11.8 FL    NEUTROPHILS 41 40 - 73 %    LYMPHOCYTES 47 21 - 52 %    MONOCYTES 10 3 - 10 %    EOSINOPHILS 1 0 - 5 %    BASOPHILS 0 0 - 2 %    ABS. NEUTROPHILS 3.1 1.8 - 8.0 K/UL    ABS. LYMPHOCYTES 3.6 0.9 - 3.6 K/UL    ABS. MONOCYTES 0.7 0.05 - 1.2 K/UL    ABS. EOSINOPHILS 0.1 0.0 - 0.4 K/UL    ABS. BASOPHILS 0.0 0.0 - 0.1 K/UL    DF AUTOMATED     METABOLIC PANEL, COMPREHENSIVE    Collection Time: 05/31/21  4:04 AM   Result Value Ref Range    Sodium 136 136 - 145 mmol/L    Potassium 3.4 (L) 3.5 - 5.5 mmol/L    Chloride 104 100 - 111 mmol/L    CO2 27 21 - 32 mmol/L    Anion gap 5 3.0 - 18 mmol/L    Glucose 85 74 - 99 mg/dL    BUN 4 (L) 7.0 - 18 MG/DL    Creatinine 0.58 (L) 0.6 - 1.3 MG/DL    BUN/Creatinine ratio 7 (L) 12 - 20      GFR est AA >60 >60 ml/min/1.73m2    GFR est non-AA >60 >60 ml/min/1.73m2    Calcium 8.8 8.5 - 10.1 MG/DL    Bilirubin, total 0.7 0.2 - 1.0 MG/DL    ALT (SGPT) 15 13 - 56 U/L    AST (SGOT) 23 10 - 38 U/L    Alk.  phosphatase 79 45 - 117 U/L    Protein, total 8.8 (H) 6.4 - 8.2 g/dL    Albumin 3.2 (L) 3.4 - 5.0 g/dL    Globulin 5.6 (H) 2.0 - 4.0 g/dL    A-G Ratio 0.6 (L) 0.8 - 1.7     GLUCOSE, POC    Collection Time: 05/31/21  6:00 AM   Result Value Ref Range    Glucose (POC) 87 70 - 110 mg/dL       Signed By: Emilia Clemons MD     May 31, 2021

## 2021-05-31 NOTE — CONSULTS
NEUROLOGY CONSULT NOTE    Patient ID:  Jelena Huang  [id number]  43 y.o.  1965    Date of Consultation:  May 31, 2021    Referring Physician: Ashish Perdomo MD    Reason for Consultation: Slurred speech        Subjective:       History of Present Illness:     Ms Jelena Huang is a 60 y.o.  woman, admitted on 30 May 2021 for stroke work-up. Yesterday, she was eating Jell-O when she choked. After that she complained of sore throat. Her son was concerned that she had a slurred speech. The patient has history of left MCA stroke, and is on Plavix/statin/ASA; she has residual right side hemiparesis. Also she has history of seizures. Brain MRI was done and negative for acute stroke. On admission blood pressure was 148/79 and repeat was 121/83. A1c 5.7. LDL 57    Today the patient she correctly state her name, her speech is dysarthric. She is able to eat and drink without difficulties. She is moving her right lower extremity, able to ambulate with a walker. Right upper extremity monoplegia and spasticity. She stated that her left MCA territory stroke was due to hypertension however to me that looks embolic etiology. Patient Active Problem List    Diagnosis Date Noted    Expressive aphasia 05/30/2021     Past Medical History:   Diagnosis Date    [facility]re[facility] [address]     HLD (hyperlipidemia)     HTN (hypertension)     Right hemiparesis ([facility] [address]     Seizure ([facility] [address]       No past surgical history on file. Prior to Admission medications    Medication Sig Start Date End Date Taking? Authorizing Provider   clopidogreL (Plavix) 75 mg tab Take 75 mg by mouth daily. Yes Provider, Historical   atorvastatin (LIPITOR) 40 mg tablet Take 40 mg by mouth daily. Yes Provider, Historical   lisinopriL (PRINIVIL, ZESTRIL) 20 mg tablet Take 20 mg by mouth daily. Yes Provider, Historical   amLODIPine (NORVASC) 10 mg tablet Take  by mouth daily.    Yes Provider, Historical   ferrous sulfate 325 mg (65 mg iron) tablet Take 65 mg by mouth every other day. Yes Provider, Historical   levETIRAcetam (Keppra) 500 mg tablet Take 500 mg by mouth two (2) times a day. Yes Provider, Historical   aspirin delayed-release 81 mg tablet Take 81 mg by mouth daily. Yes Provider, Historical     Allergies   Allergen Reactions    Aggrenox [Aspirin-Dipyridamole] Swelling      Social History     Tobacco Use    Smoking status: Not on file   Substance Use Topics    Alcohol use: Not on file      No family history on file. Review of Systems    Constitutional: No recent weight change, fever,fatigue, sleep difficulties, or loss of appetite. ENT/Mouth:  No hearing loss, ringing in the ears, chronic sinus problem, nose bleeds sore throat, voice change, hoarseness, swollen glands in neck, or difficulties with chewing and swallowing. Cardiovascular:  No chest pain/angina pectoris, palpitations,swelling of feet/ankles/hands, or calf pain while walking. Respiratory: No chronic or frequent coughs, spitting up blood, shortness of breath, asthma, or wheezing. Gastrointestinal: No abdominal pain, heartburn, nausea, vomiting, constipation, frequent diarrhea, rectal bleeding, or blood in stool. Genitourinary: No frequent urination, burning or painful urination, blood in urine, incontinence or dribbling. Musculoskeletal:   No joint pain, stiffness/swelling, weakness of muscles, muscle pain/cramp, or back pain. Integument:   No rash/itching, change in skin color, change in hair/nails, or change in color/size of moles. Neurological:   Right-sided weakness with no movements right upper extremity, at baseline expressive aphasia, dysarthria, mild right-sided numbness, gait difficulty, history of seizures. Otherwise negative   Psychiatric:   No nervousness, depression, hallucinations, paranoia or suspiciousness. Endocrine: No excessive thirst or urination, heat or cold intolerance.    Hematologic/Lymphatic: No bleeding/bruising tendency, phlebitis, or past transfusion. Objective:     Patient Vitals for the past 8 hrs:   BP Temp Pulse Resp SpO2   05/31/21 0840 124/87 98.4 °F (36.9 °C) 85 18 96 %   05/31/21 0400 119/72 97.8 °F (36.6 °C) 77 18 96 %       General Exam  No acute distress, normal body habitus    HEENT: Normocephalic, atraumatic, Sclera anicteric, normal conjunctiva  Mucous membranes: normal color and hydration     CV: No carotid bruits,   Heart: regular to rate and rhythm. No murmurs     RESP:  CTAB     Neurologic Exam:    MENTAL STATUS:     The patient is awake, alert, and oriented to person, situation, date. Word finding difficulties, speech is dysarthric.   findings are localizable to the known left MCA stroke. Comprehension is intact. CRANIAL NERVES:   II -Pupils are midline, symmetric, both reactive to light and accommodation. III, IV, VI - Extraocular movements are intact and there is no nystagmus. V - Facial sensation is intact to pinprick and light touch. VII - Face is symmetrical.   VIII - Hearing is present. IX, X, XII- Palate rises symmetrically. Gag is present. Tongue is in the midline. XI - Shoulder shrugging and head turning intact    MOTOR:           Right upper extremity spasticity with contraction of flexor muscle, and loss of muscle bulk in right upper extremity. Right lower extremity  4/5 particularly with flexor muscle. Increased tone. .  No involuntary movements    SENSATION:  Sensory examination is diminished on the right upper and lower extremity otherwise intact. REFLEXES: 1+ and symmetrical with exception of right upper extremity inability to assess the brachialis muscles due to spasticity, 3/4 brachioradialis. COORDINATION: Cerebellar examination reveals no gross ataxia on the left upper and bilateral lower extremities                GAIT: Gait is not tested at this time.       Data Review:    Recent Results (from the past 24 hour(s))   GLUCOSE, POC    Collection Time: 05/30/21  4:42 PM   Result Value Ref Range    Glucose (POC) 140 (H) 70 - 110 mg/dL   CBC WITH AUTOMATED DIFF    Collection Time: 05/30/21  4:44 PM   Result Value Ref Range    WBC 7.9 4.6 - 13.2 K/uL    RBC 4.54 4.20 - 5.30 M/uL    HGB 11.3 (L) 12.0 - 16.0 g/dL    HCT 36.3 35.0 - 45.0 %    MCV 80.0 74.0 - 97.0 FL    MCH 24.9 24.0 - 34.0 PG    MCHC 31.1 31.0 - 37.0 g/dL    RDW 14.6 (H) 11.6 - 14.5 %    PLATELET 854 145 - 930 K/uL    MPV 8.7 (L) 9.2 - 11.8 FL    NEUTROPHILS 40 40 - 73 %    LYMPHOCYTES 51 21 - 52 %    MONOCYTES 8 3 - 10 %    EOSINOPHILS 1 0 - 5 %    BASOPHILS 0 0 - 2 %    ABS. NEUTROPHILS 3.1 1.8 - 8.0 K/UL    ABS. LYMPHOCYTES 4.0 (H) 0.9 - 3.6 K/UL    ABS. MONOCYTES 0.6 0.05 - 1.2 K/UL    ABS. EOSINOPHILS 0.1 0.0 - 0.4 K/UL    ABS. BASOPHILS 0.0 0.0 - 0.1 K/UL    DF AUTOMATED     METABOLIC PANEL, COMPREHENSIVE    Collection Time: 05/30/21  4:44 PM   Result Value Ref Range    Sodium 139 136 - 145 mmol/L    Potassium 4.1 3.5 - 5.5 mmol/L    Chloride 106 100 - 111 mmol/L    CO2 26 21 - 32 mmol/L    Anion gap 7 3.0 - 18 mmol/L    Glucose 116 (H) 74 - 99 mg/dL    BUN 4 (L) 7.0 - 18 MG/DL    Creatinine 0.71 0.6 - 1.3 MG/DL    BUN/Creatinine ratio 6 (L) 12 - 20      GFR est AA >60 >60 ml/min/1.73m2    GFR est non-AA >60 >60 ml/min/1.73m2    Calcium 8.2 (L) 8.5 - 10.1 MG/DL    Bilirubin, total 0.4 0.2 - 1.0 MG/DL    ALT (SGPT) 17 13 - 56 U/L    AST (SGOT) 39 (H) 10 - 38 U/L    Alk.  phosphatase 88 45 - 117 U/L    Protein, total 9.0 (H) 6.4 - 8.2 g/dL    Albumin 3.3 (L) 3.4 - 5.0 g/dL    Globulin 5.7 (H) 2.0 - 4.0 g/dL    A-G Ratio 0.6 (L) 0.8 - 1.7     PROTHROMBIN TIME + INR    Collection Time: 05/30/21  4:44 PM   Result Value Ref Range    Prothrombin time 12.5 11.5 - 15.2 sec    INR 0.9 0.8 - 1.2     LIPID PANEL    Collection Time: 05/30/21  4:44 PM   Result Value Ref Range    LIPID PROFILE          Cholesterol, total 115 <200 MG/DL    Triglyceride 125 <150 MG/DL    HDL Cholesterol 33 (L) 40 - 60 MG/DL    LDL, calculated 57 0 - 100 MG/DL    VLDL, calculated 25 MG/DL    CHOL/HDL Ratio 3.5 0 - 5.0     HEMOGLOBIN A1C WITH EAG    Collection Time: 05/30/21  4:44 PM   Result Value Ref Range    Hemoglobin A1c 5.7 (H) 4.2 - 5.6 %    Est. average glucose 117 mg/dL   EKG, 12 LEAD, INITIAL    Collection Time: 05/30/21  5:04 PM   Result Value Ref Range    Ventricular Rate 92 BPM    Atrial Rate 92 BPM    P-R Interval 190 ms    QRS Duration 72 ms    Q-T Interval 390 ms    QTC Calculation (Bezet) 482 ms    Calculated P Axis 41 degrees    Calculated R Axis 17 degrees    Calculated T Axis 45 degrees    Diagnosis       Normal sinus rhythm  Prolonged QT  Abnormal ECG  No previous ECGs available  Confirmed by Nitza Jimenez MD, ----- ([id number]) on 5/31/2021 9:31:50 AM     CBC WITH AUTOMATED DIFF    Collection Time: 05/31/21  4:04 AM   Result Value Ref Range    WBC 7.5 4.6 - 13.2 K/uL    RBC 4.27 4.20 - 5.30 M/uL    HGB 10.7 (L) 12.0 - 16.0 g/dL    HCT 34.3 (L) 35.0 - 45.0 %    MCV 80.3 74.0 - 97.0 FL    MCH 25.1 24.0 - 34.0 PG    MCHC 31.2 31.0 - 37.0 g/dL    RDW 14.6 (H) 11.6 - 14.5 %    PLATELET 156 320 - 918 K/uL    MPV 9.1 (L) 9.2 - 11.8 FL    NEUTROPHILS 41 40 - 73 %    LYMPHOCYTES 47 21 - 52 %    MONOCYTES 10 3 - 10 %    EOSINOPHILS 1 0 - 5 %    BASOPHILS 0 0 - 2 %    ABS. NEUTROPHILS 3.1 1.8 - 8.0 K/UL    ABS. LYMPHOCYTES 3.6 0.9 - 3.6 K/UL    ABS. MONOCYTES 0.7 0.05 - 1.2 K/UL    ABS. EOSINOPHILS 0.1 0.0 - 0.4 K/UL    ABS.  BASOPHILS 0.0 0.0 - 0.1 K/UL    DF AUTOMATED     METABOLIC PANEL, COMPREHENSIVE    Collection Time: 05/31/21  4:04 AM   Result Value Ref Range    Sodium 136 136 - 145 mmol/L    Potassium 3.4 (L) 3.5 - 5.5 mmol/L    Chloride 104 100 - 111 mmol/L    CO2 27 21 - 32 mmol/L    Anion gap 5 3.0 - 18 mmol/L    Glucose 85 74 - 99 mg/dL    BUN 4 (L) 7.0 - 18 MG/DL    Creatinine 0.58 (L) 0.6 - 1.3 MG/DL    BUN/Creatinine ratio 7 (L) 12 - 20      GFR est AA >60 >60 ml/min/1.73m2    GFR est non-AA >60 >60 ml/min/1.73m2    Calcium 8.8 8.5 - 10.1 MG/DL    Bilirubin, total 0.7 0.2 - 1.0 MG/DL    ALT (SGPT) 15 13 - 56 U/L    AST (SGOT) 23 10 - 38 U/L    Alk. phosphatase 79 45 - 117 U/L    Protein, total 8.8 (H) 6.4 - 8.2 g/dL    Albumin 3.2 (L) 3.4 - 5.0 g/dL    Globulin 5.6 (H) 2.0 - 4.0 g/dL    A-G Ratio 0.6 (L) 0.8 - 1.7     GLUCOSE, POC    Collection Time: 05/31/21  6:00 AM   Result Value Ref Range    Glucose (POC) 87 70 - 110 mg/dL         Radiology studies:   Brain MRI 30 May 2021: IMPRESSION     No acute ischemic change or hemorrhage.     Large region encephalomalacic consistent with left MCA territory infarct. Apparent occlusion of the left ICA except possibly the segment distal to the  left ophthalmic artery. Occasional small deep cerebral white matter lesion,  most likely chronic microvascular ischemic change. The remainder of the brain  is otherwise unremarkable except for ex vacuo enlargement of the ventricles. 55 minutes were spent on the patient /family of which more than 50% was spent in coordination of care and counseling (time spent with patient/family face to face, physical exam, reviewing laboratory and imaging investigations, speaking with physicians and nursing staff involved in this patient's care)    Assessment: This is a 64-year-old woman, with history of left large MCA stroke with encephalomalacia, with baseline spastic right hemiparesis, right upper extremity worse comparing with right lower extremity, and expressive aphasia, evaluated today for possible recurrent stroke. MRI was negative for acute stroke. Patient clinical presentation, history is not concerning for a new stroke, and this was asked to provide MRI. Since family believed her aphasia was worst with the presentation I suspect she just had a TIA. I recommend to continue with her current home medications and it is appropriate to be discharge.       Plan:     -Continue aspirin and Plavix  -PCP follow-up, will send again hypercoagulable work-up  -She probably will benefit is to have follow-up with cardiology and consider long-term monitoring given the fact that she had a large vessel stroke in the past at young age  -No other recommendations okay to be discharged home          Sandeep Wynne MD  Adult Neurologist  5/31/2021

## 2021-05-31 NOTE — PROGRESS NOTES
Problem: Mobility Impaired (Adult and Pediatric)  Goal: *Acute Goals and Plan of Care (Insert Text)  Outcome: Resolved/Met     PHYSICAL THERAPY EVALUATION AND DISCHARGE    Patient: Deanna Bailey (7 y.o. female)  Date: 5/31/2021  Primary Diagnosis: Expressive aphasia [R47.01]        Precautions:      WBAT  PLOF: pt was mod ind/supervision with quad cane PTA, has hx of CVA with R hemiparesis, lives with son in a 1  with 3 ADRIÁN    ASSESSMENT :  Based on the objective data described below, the patient presents with baseline for functional mobility. Pt has hx of CVA with R sided hemiparesis. RUE presents with increased toned with flexed posturing. RLE exhibits decreased AROM and 3/5 strength, however pt is ambulatory with a cane at baseline. Pt limited by expressive aphasia but is able to answer questions with gestures or head nods as well as minimal words, pt also has a communication board at bedside. At this time, pt is able to perform bed mobility and transfer with mod ind. Pt was able to amb in/out of the bathroom with supervision for safety with no AD or LOB noted with mod ind for pericare. Pt has hip hike on the R for increased foot clearance as well as WBOS and decreased speed but is overall at her functional baseline and thus does not require skilled PT at this time. Will sign off  with no discharge needs noted, pt agreeable. Patient does not require further skilled intervention at this level of care. PLAN :  Recommendations and Planned Interventions:   No formal PT needs identified at this time. Discharge Recommendations: None  Further Equipment Recommendations for Discharge: N/A     SUBJECTIVE:   Patient nodded in agreeance to being at baseline for mobility.      OBJECTIVE DATA SUMMARY:     Past Medical History:   Diagnosis Date    CVA (cerebral vascular accident) ([facility] [address]     HLD (hyperlipidemia)     HTN (hypertension)     Right hemiparesis ([facility] [address]     Seizure ([facility] [address]    No past surgical history on file.  Barriers to Learning/Limitations: yes;  physical  Compensate with: Verbal Cues and Tactile Cues  Home Situation:   Home Situation  Home Environment: Private residence  # Steps to Enter: 3  One/Two Story Residence: One story  Living Alone: No  Support Systems: Child(radha)  Patient Expects to be Discharged to[de-identified] Private residence  Current DME Used/Available at Home: Jose Reynolds, quad  Critical Behavior:  Neurologic State: Alert  Orientation Level: Unable to verbalize;Oriented X4  Cognition: Follows commands  Safety/Judgement: Fall prevention  Psychosocial  Patient Behaviors: Calm; Cooperative  Purposeful Interaction: Yes  Pt Identified Daily Priority: Clinical issues (comment)  Car[employer] Process: Attend basic human needs; Teaching/learning  Caring Interventions: Reassure  Reassure: Caring rounds                 Strength:    Strength: Grossly decreased, non-functional (RLE grossly 3/5, LLE 5/5)       Tone & Sensation:   Tone: Abnormal (increased tone in RUE )       Sensation: Intact     Range Of Motion:  AROM: Generally decreased, functional (RLE limited by approx 25%, LLE WFL )        Functional Mobility:  Bed Mobility:     Supine to Sit: Modified independent  Sit to Supine: Modified independent  Scooting: Modified independent  Transfers:  Sit to Stand: Modified independent  Stand to Sit: Modified independent    Balance:   Sitting: Intact  Standing: Intact; Without support    Ambulation/Gait Training:  Distance (ft): 15 Feet (ft) (x2 )  Assistive Device: Other (comment) (none )  Ambulation - Level of Assistance: Supervision        Gait Abnormalities: Decreased step clearance;Hip Hike (hip hike on the RLE to increase foot clearance )        Base of Support: Widened     Speed/Evelyn: Pace decreased (<100 feet/min)  Step Length: Right shortened;Left shortened       Pain:  Pain level pre-treatment: 0/10   Pain level post-treatment: 0/10  Pain Intervention(s): Medication (see MAR);  Rest, Ice, Repositioning   Response to intervention: Nurse notified    Activity Tolerance:   /Good    Please refer to the flowsheet for vital signs taken during this treatment. After treatment:   []         Patient left in no apparent distress sitting up in chair  [x]         Patient left in no apparent distress in bed  [x]         Call bell left within reach  [x]         Nursing notified  []         Caregiver present  [x]         Bed alarm activated  []         SCDs applied    COMMUNICATION/EDUCATION:   [x]         Role of Physical Therapy in the acute care setting. [x]         Fall prevention education was provided and the patient/caregiver indicated understanding. [x]         Patient/family have participated as able in goal setting and plan of care. [x]         Patient/family agree to work toward stated goals and plan of care. []         Patient understands intent and goals of therapy, but is neutral about his/her participation. []         Patient is unable to participate in goal setting/plan of care: ongoing with therapy staff.  []         Other:     Thank you for this referral.  Natan Barber   Time Calculation: 8 mins      Eval Complexity: History: MEDIUM  Complexity : 1-2 comorbidities / personal factors will impact the outcome/ POC Exam:MEDIUM Complexity : 3 Standardized tests and measures addressing body structure, function, activity limitation and / or participation in recreation  Presentation: LOW Complexity : Stable, uncomplicated  Clinical Decision Making:Low Complexity    Overall Complexity:LOW

## 2021-05-31 NOTE — ED NOTES
Patient awake and alert in no acute distress at this time swallow eval done on patient able to tolerate apple sauce with no issues, able to drink water by straw and by cup with no incident able to swallow lipitor as ordered.

## 2021-05-31 NOTE — PROGRESS NOTES
conducted an initial consultation and Spiritual Assessment for Alpena Luca, who is a 60 y.o.,female. Patient's Primary Language is: [place]. According to the patient's EMR Restoration Affiliation is: Non Congregation.     The reason the Patient came to the hospital is:   Patient Active Problem List    Diagnosis Date Noted    Expressive aphasia 05/30/2021        The  provided the following Interventions:  Initiated a relationship of care and support. Listened empathetically. Provided chaplaincy education. Offered assurance of continued prayers on patient's behalf. Chart reviewed. The following outcomes where achieved:  Patient was not able to speak clearly. She wrote answers and gestured so that she was understood. Patient processed feeling about current hospitalization. Patient expressed gratitude for 's visit. Assessment:  Patient does not have any known Amish/cultural needs that will affect patient's preferences in health care. There are no known spiritual or Amish issues which require intervention at this time. Plan:  Chaplains will continue to follow and will provide pastoral care as needed and as requested.  recommends bedside caregivers page the  on duty if patient shows signs of spiritual or emotional distress. Chandra. Antoinette, Jj Gonzalez.  R Ritu Dsouza [address]   [phone]

## 2021-05-31 NOTE — ED NOTES
Patient for MRI but is aphasic X-rays complete for MRI clearance MRI tech informed that once cleared patient will have MRI.

## 2021-05-31 NOTE — PROGRESS NOTES
Problem: Dysphagia (Adult)  Goal: *Acute Goals and Plan of Care (Insert Text)  Description: Patient will:  1. Tolerate PO trials with 0 s/s overt distress in 4/5 trials  2. Participate in training and education related to continued aspiration risk, diet recs and compensatory strategies     Recommend:   Regular diet with thin liquids  Meds one at a time, slow rate   Aspiration precautions  HOB >45 degrees during all intake and for at least 30 min after po   Small bites/sips, Slow rate of intake      Outcome: Progressing Towards Goal     Problem: Communication Impaired (Adult)  Goal: *Acute Goals and Plan of Care (Insert Text)  Description: Pt will:   1. Complete oral motor placement exercises to facilitate functional communication for expressing basic wants/needs/ideas given mod verbal/visual cues in 4/5 opportunities. 2.  Utilize compensatory speech strategies for improved communication given min verbal/visual cues in 4/5 opportunities. 3.  Complete varied naming tasks given min verbal/visual/phonemic cues to improve functional communication in 4/5 opportunities. Outcome: Progressing Towards Goal    SPEECH LANGUAGE PATHOLOGY BEDSIDE SPEECH-LANGUAGE   AND SWALLOW EVALUATION    Patient: Hillary Amin (1 y.o. female)  Date: 5/31/2021  Primary Diagnosis: Expressive aphasia [R47.01]  Precautions: Aspiration,  Seizure  PLOF: regular diet with thin liquids; reports speech was within functional limits prior to admission     ASSESSMENT :  Dysphagia:    Based on the objective data described below, the patient presents with suspected min pharyngeal dysphagia. Pt denying dysphagia. Per chart review, pt with choking episode on jello. Pt denying any other coughing/choking incidents prior to admission. Oral mech exam revealed R facial droop, suspect baseline related to previous CVA.   Pt tolerating thin liquids + straw with timely swallow initiation, adequate laryngeal elevation to palpation and no overt s/sx aspiration. Demo adequate rotary chew with thorough oral clearance with regular solid presentation. Demonstrating eye tearing during med pass with multiple pills + water. Recommend regular diet with thin liquids, meds one at a time while maintaining aspiration precautions. 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 Receptive language intact. Pt utilizing pen/paper for communication as speech intelligibly significantly reduced (less than 50% intelligible in conversation). Pt with approximations of word with phonemic errors, groping with awareness of errors. Demo improved speech during automatic speech tasks. Pt reporting via pen/paper that speech functional at baseline. Recommend SLP to address deficits during this admission and upon discharge. Discussed with pt and RN. Patient's rehabilitation potential is considered to be Good  Factors which may influence rehabilitation potential include:     []            None noted  [x]            Mental ability/status  []            Medical condition  []            Home/family situation and support systems  []            Safety awareness  []            Pain tolerance/management  []            Other:      PLAN :  Recommendations and Planned Interventions:  As above   Frequency/Duration: Patient will be followed by speech-language pathology 1-2 times per day/4-7 days per week to address goals. Discharge Recommendations: May benefit from SLP upon discharge      SUBJECTIVE:   Patient stated Thank you (via pen/paper)     OBJECTIVE:     Past Medical History:   Diagnosis Date    CVA (cerebral vascular accident) ([facility] [address]     HLD (hyperlipidemia)     HTN (hypertension)     Right hemiparesis ([facility] [address]     Seizure ([facility] [address]    No past surgical history on file.   Home Situation:   Home Situation  Home Environment: Private residence  # Steps to Enter: 3  One/Two Story Residence: One story  Living Alone: No  Support Systems: Child(radha)  Patient Expects to be Discharged to[de-identified] Private residence  Current DME Used/Available at Home: Cane, quad  Tub or Shower Type: Tub/Shower combination  Diet prior to admission: Regular/thin liquids   Current Diet:  Regular/thin liquids    Cognitive and Communication Status:  Neurologic State: Alert  Orientation Level: Oriented X4  Cognition: Follows commands  Perception: Appears intact  Perseveration: No perseveration noted  Safety/Judgement: Awareness of environment, Fall prevention  Motor Speech:  Oral-Motor Structure/Motor Speech  Labial: Impaired coordination;Decreased seal  Dentition: Natural  Oral Hygiene: Good  Lingual: Incoordinated  Velum: No impairment  Mandible: No impairment  Apraxic Characteristics: Fewer errors in automatic speech than volitional speech;Numerous attempts at the word; Phonemic errors; Visible/audible searching; Awareness of errors;Verbal  Intelligibility: Impaired  Language Comprehension and Expression:  Verbal Expression  Naming: Impaired  Voice:  Vocal Quality: Low volume  Oral Assessment:  Oral Assessment  Labial: Impaired coordination;Decreased seal  Dentition: Natural  Oral Hygiene: Good  Lingual: Incoordinated  Velum: No impairment  Mandible: No impairment  P.O. Trials:  Patient Position: [facility] at [facility]  Vocal quality prior to P.O.: Low volume  Consistency Presented: Thin liquid; Solid;Pill/Tablet;Puree  How Presented: Self-fed/presented;Spoon;Straw;Successive swallows  Bolus Acceptance: No impairment  Bolus Formation/Control: Impaired  Type of Impairment: Delayed; Posterior;Poor;Mastication  Propulsion: Discoordination;Delayed (# of seconds)  Oral Residue: Less than 10% of bolus; Lingual  Initiation of Swallow: No impairment  Laryngeal Elevation: Functional  Aspiration Signs/Symptoms: Watery eyes (x1 )  Pharyngeal Phase Characteristics: No impairment, issues, or problems   Effective Modifications: Small sips and bites  Cues for Modifications: Minimal  Oral Phase Severity: No impairment  Pharyngeal Phase Severity : Minimal    PAIN:  Pain level pre-treatment: 0/10   Pain level post-treatment: 0/10     After Evaluation:   []            Patient left in no apparent distress sitting up in chair  [x]            Patient left in no apparent distress in bed  [x]            Call bell left within reach  [x]            Nursing notified  []            Family present  []            Caregiver present  []            Bed alarm activated    COMMUNICATION/EDUCATION:   [x]            Aspiration precautions; swallow safety; compensatory techniques. [x]            Receptive communication strategies  [x]            Patient/family have participated as able in goal setting and plan of care. []            Patient/family agree to work toward stated goals and plan of care. []            Patient understands intent and goals of therapy; neutral about participation. []            Patient unable to participate in goal setting/plan of care; educ ongoing with interdisciplinary staff  []         Posted safety precautions in patient's room.       Thank you for this referral,  Gwendolyn Varma M.S., [address] [facility]  Speech-Language Pathologist

## 2021-05-31 NOTE — PROGRESS NOTES
OCCUPATIONAL THERAPY EVALUATION/DISCHARGE    Patient: Renetta Chváez (9 y.o. female)  Date: 5/31/2021  Primary Diagnosis: Expressive aphasia [R47.01]        Precautions:   Seizure  PLOF: Pt is able to answer yes/no questions, rest of PLOF she was able to provide in writing. Pt lives with her son and performs her ADLs with Mod Ind with exception for bathing, receiving assistance from her son. Pt has tub/shower and her son assists her to get in/out of the tub for bathing. Pt usually performs functional mobility with cane. ASSESSMENT AND RECOMMENDATIONS:  Based on the objective data described below, the patient presents with ability to perform basic ADLs and functional transfers at baseline level of function. Pt performed/simulated UB/LB dressing with Mod Ind/Supervision for seated and std aspects. Pt demonstrates good safety awareness during all standing tasks. Pt's RUE with spasticity with very limited AROM (pt with hx of CVA). Pt is familiar with all one-handed strategies for ADLs and was able to demonstrate them w/o assistance. Pt lives with supportive son available to assist as needed. Skilled occupational therapy is not indicated at this time. Discharge Recommendations: Home with family support  Further Equipment Recommendations for Discharge: shower chair      SUBJECTIVE:   Patient stated [phone].  pt is with expressive aphasia and limited verbalizations. Pt was able to write with left hand clearly    OBJECTIVE DATA SUMMARY:     Past Medical History:   Diagnosis Date    CVA (cerebral vascular accident) ([facility] [address]     HLD (hyperlipidemia)     HTN (hypertension)     Right hemiparesis ([facility] [address]     Seizure ([facility] [address]    No past surgical history on file.   Barriers to Learning/Limitations: None  Compensate with: visual, verbal, tactile, kinesthetic cues/model    Home Situation:   Home Situation  Home Environment: Private residence  # Steps to Enter: 3  One/Two Story Residence: One story  Living Alone: No  Support Systems: Child(radha)  Patient Expects to be Discharged to[de-identified] Private residence  Current DME Used/Available at Home: Cane, quad  Tub or Shower Type: Tub/Shower combination  []     Right hand dominant   [x]     Left hand dominant    Cognitive/Behavioral Status:  Neurologic State: Alert  Orientation Level: Oriented X4  Cognition: Follows commands  Safety/Judgement: Awareness of environment; Fall prevention    Skin: visible skin intact  Edema: non enoted    Vision/Perceptual:       Acuity: Within Defined Limits         Coordination: BUE  Coordination: Generally decreased, functional (RUE non functional)  Fine Motor Skills-Upper: Left Intact; Right Impaired    Gross Motor Skills-Upper: Left Intact; Right Impaired    Balance:  Sitting: Intact  Standing: Intact; Without support    Strength: BUE  Strength: Grossly decreased, non-functional (RLE grossly 3/5, LLE 5/5)   Tone & Sensation: BUE  Tone: Abnormal (RUE flexion contracture)  Sensation: Intact   Range of Motion: BUE  AROM: Generally decreased, functional (RUE non functional)     Functional Mobility and Transfers for ADLs:  Bed Mobility:     Supine to Sit: Modified independent  Sit to Supine: Modified independent  Scooting: Modified independent  Transfers:  Sit to Stand: Modified independent  Stand to Sit: Modified independent        Toilet Transfer : Modified independent      ADL Assessment:  Feeding: Setup    Oral Facial Hygiene/Grooming: Setup    Bathing: Contact guard assistance    Upper Body Dressing: Supervision    Lower Body Dressing: Supervision    Toileting: Modified independent     Cognitive Retraining  Safety/Judgement: Awareness of environment; Fall prevention  Pain:  Pain level pre-treatment: 0/10   Pain level post-treatment: 0/10     Activity Tolerance:   Good  Please refer to the flowsheet for vital signs taken during this treatment.   After treatment:   []  Patient left in no apparent distress sitting up in chair  [x]  Patient left in no apparent distress in bed  [x] Call bell left within reach  [x]  Nursing notified  []  Caregiver present  []  Bed alarm activated    COMMUNICATION/EDUCATION:   [x]      Role of Occupational Therapy in the acute care setting  [x]      Home safety education was provided and the patient/caregiver indicated understanding. [x]      Patient/family have participated as able and agree with findings and recommendations. []      Patient is unable to participate in plan of care at this time. Thank you for this referral.  Carolina Galindo, OTR/L  Time Calculation: 26 mins      Eval Complexity: History: LOW Complexity : Brief history review ; Examination: LOW Complexity : 1-3 performance deficits relating to physical, cognitive , or psychosocial skils that result in activity limitations and / or participation restrictions ;    Decision Making:LOW Complexity : No comorbidities that affect functional and no verbal or physical assistance needed to complete eval tasks

## 2021-06-02 LAB — LEVETIRACETAM SERPL-MCNC: 9.5 UG/ML (ref 10–40)

## 2021-06-02 NOTE — ED NOTES
Late entry for clarification  Patient awake and alert in no acute distress at this time swallow eval done on patient able to tolerate apple sauce with no issues, able to drink water by straw and by cup with no incident able to swallow lipitor as ordered. Patient non verbal at baseline can only say yes and no.

## 2021-06-21 NOTE — DISCHARGE SUMMARY
Discharge Summary    Patient: Emmie Blackmon MRN: [id number]  CSN: [id number]    YOB: 1965  Age: 60 y.o. Sex: female    DOA: 5/30/2021 LOS:  LOS: 1 day   Discharge Date: 5/31/2021     Admission Diagnoses: Expressive aphasia [R47.01]    Discharge Diagnoses:    TIA   Hx prior CVA with expressive aphasia and R hemiparesis  Hypokalemia  HTN  HLD    Discharge Condition: Stable    PHYSICAL EXAM  Visit Vitals  /87   Pulse 85   Temp 98.4 °F (36.9 °C)   Resp 18   Ht 5' 6\" (1.676 m)   Wt 77.1 kg (169 lb 15.6 oz)   SpO2 96%   Breastfeeding No   BMI 27.43 kg/m²       General: In NAD. Nontoxic-appearing. HEENT: NCAT. Sclerae anicteric, EOMI. Lungs:  Clear, no wheezes. No accessory muscle use. Heart:  RRR. Abdomen: Soft, NT/ND. Extremities: Warm, no edema or ischemia. Psych:   Mood normal.  Neurologic:  Awake and alert. Old right-sided weakness and expressive aphasia, unchanged. Hospital Course:   See admission H&P for full details of HPI. Patient was admitted to the hospital after presenting to the ED for evaluation following a choking episode at home. There was concern for possible acute neurologic insult but work-up has been negative. Neurology evaluation has been obtained and there is no recommendation for further inpatient neurologic work-up. Patient is felt to be medically stable for discharge home with outpatient follow-up as advised. Consults:   Neurology      Significant Diagnostic Studies/Procedures:   MRI brain:  IMPRESSION     No acute ischemic change or hemorrhage.     Large region encephalomalacic consistent with left MCA territory infarct. Apparent occlusion of the left ICA except possibly the segment distal to the  left ophthalmic artery. Occasional small deep cerebral white matter lesion,  most likely chronic microvascular ischemic change.   The remainder of the brain  is otherwise unremarkable except for ex vacuo enlargement of the ventricles.     Note: A congruent preliminary report was provided by Dr. Nikole Hanley at the  time of the study. CT head:  IMPRESSION     No CT evidence of acute intracranial pathology.      Large region of encephalomalacia in the left cerebrum consistent with chronic  infarct in the MCA territory.     I have telephoned a wet reading directly to Dr. Theo Ramirez  at 5:07 PM on 5/30/2021. CXR:  IMPRESSION     No acute finding. XR orbits:  IMPRESSION     Negative orbital screening prior to MRI. XR abdomen:  IMPRESSION     No metallic foreign body in the abdomen. Discharge Medications:     Discharge Medication List as of 5/31/2021 12:50 PM      START taking these medications    Details   potassium chloride SR (K-TAB) 20 mEq tablet Take 1 Tablet by mouth two (2) times a day for 5 days. , Normal, Disp-10 Tablet, R-0         CONTINUE these medications which have CHANGED    Details   clopidogreL (Plavix) 75 mg tab Take 1 Tablet by mouth daily. , Normal, Disp-30 Tablet, R-0      aspirin delayed-release 81 mg tablet Take 1 Tablet by mouth daily. , Normal, Disp-30 Tablet, R-0      atorvastatin (LIPITOR) 40 mg tablet Take 1 Tablet by mouth daily. , Normal, Disp-30 Tablet, R-0      lisinopriL (PRINIVIL, ZESTRIL) 20 mg tablet Take 1 Tablet by mouth daily. , Normal, Disp-30 Tablet, R-0      levETIRAcetam (Keppra) 500 mg tablet Take 1 Tablet by mouth two (2) times a day., Normal, Disp-60 Tablet, R-0      amLODIPine (NORVASC) 10 mg tablet Take 1 Tablet by mouth daily. , Normal, Disp-30 Tablet, R-0      ferrous sulfate 325 mg (65 mg iron) tablet Take 1 Tablet by mouth every other day., Normal, Disp-30 Tablet, R-0               Activity: As tolerated. Diet: Cardiac Diet    Disposition: Home. Follow-up: with PCP in 1 week. Minutes spent on discharge: >30 minutes spent coordinating this discharge.         Marla Rosas MD  [address] [place], [place] [address]    Disclaimer: Sections of this note are dictated using utilizing voice recognition software. Minor typographical errors may be present. If questions arise, please do not hesitate to contact me or call our department.

## 2021-06-28 ENCOUNTER — OFFICE VISIT (OUTPATIENT)
Dept: FAMILY MEDICINE CLINIC | Age: 56
End: 2021-06-28
Payer: MEDICAID

## 2021-06-28 VITALS
HEIGHT: 66 IN | WEIGHT: 148 LBS | BODY MASS INDEX: 23.78 KG/M2 | TEMPERATURE: 95.8 F | SYSTOLIC BLOOD PRESSURE: 124 MMHG | HEART RATE: 106 BPM | DIASTOLIC BLOOD PRESSURE: 81 MMHG | OXYGEN SATURATION: 97 % | RESPIRATION RATE: 16 BRPM

## 2021-06-28 DIAGNOSIS — I10 ESSENTIAL HYPERTENSION: ICD-10-CM

## 2021-06-28 DIAGNOSIS — R56.9 SEIZURE (HCC): ICD-10-CM

## 2021-06-28 DIAGNOSIS — Z12.11 COLON CANCER SCREENING: ICD-10-CM

## 2021-06-28 DIAGNOSIS — Z12.31 ENCOUNTER FOR SCREENING MAMMOGRAM FOR MALIGNANT NEOPLASM OF BREAST: Primary | ICD-10-CM

## 2021-06-28 DIAGNOSIS — Z11.59 ENCOUNTER FOR HEPATITIS C SCREENING TEST FOR LOW RISK PATIENT: ICD-10-CM

## 2021-06-28 DIAGNOSIS — E78.2 MIXED HYPERLIPIDEMIA: ICD-10-CM

## 2021-06-28 DIAGNOSIS — D64.9 ANEMIA, UNSPECIFIED TYPE: ICD-10-CM

## 2021-06-28 DIAGNOSIS — I69.359 CVA, OLD, HEMIPARESIS (HCC): ICD-10-CM

## 2021-06-28 PROCEDURE — 99204 OFFICE O/P NEW MOD 45 MIN: CPT | Performed by: NURSE PRACTITIONER

## 2021-06-28 RX ORDER — LISINOPRIL 20 MG/1
20 TABLET ORAL DAILY
Qty: 30 TABLET | Refills: 2 | Status: SHIPPED | OUTPATIENT
Start: 2021-06-28 | End: 2021-10-13 | Stop reason: SDUPTHER

## 2021-06-28 RX ORDER — POTASSIUM CHLORIDE 20 MEQ/1
20 TABLET, EXTENDED RELEASE ORAL 2 TIMES DAILY
Qty: 30 TABLET | Refills: 2 | Status: SHIPPED | OUTPATIENT
Start: 2021-06-28 | End: 2021-12-02 | Stop reason: ALTCHOICE

## 2021-06-28 RX ORDER — ATORVASTATIN CALCIUM 40 MG/1
40 TABLET, FILM COATED ORAL DAILY
Qty: 30 TABLET | Refills: 2 | Status: SHIPPED | OUTPATIENT
Start: 2021-06-28 | End: 2021-09-23 | Stop reason: SDUPTHER

## 2021-06-28 RX ORDER — POTASSIUM CHLORIDE 20 MEQ/1
20 TABLET, EXTENDED RELEASE ORAL 2 TIMES DAILY
COMMUNITY
End: 2021-06-28 | Stop reason: SDUPTHER

## 2021-06-28 RX ORDER — LEVETIRACETAM 500 MG/1
500 TABLET ORAL 2 TIMES DAILY
Qty: 60 TABLET | Refills: 2 | Status: SHIPPED | OUTPATIENT
Start: 2021-06-28 | End: 2021-10-13 | Stop reason: SDUPTHER

## 2021-06-28 RX ORDER — CLOPIDOGREL BISULFATE 75 MG/1
75 TABLET ORAL DAILY
Qty: 30 TABLET | Refills: 2 | Status: SHIPPED | OUTPATIENT
Start: 2021-06-28 | End: 2021-09-23 | Stop reason: SDUPTHER

## 2021-06-28 RX ORDER — AMLODIPINE BESYLATE 10 MG/1
10 TABLET ORAL DAILY
Qty: 30 TABLET | Refills: 2 | Status: SHIPPED | OUTPATIENT
Start: 2021-06-28 | End: 2021-09-23 | Stop reason: SDUPTHER

## 2021-06-28 RX ORDER — ASPIRIN 81 MG/1
81 TABLET ORAL DAILY
Qty: 30 TABLET | Refills: 2 | Status: SHIPPED | OUTPATIENT
Start: 2021-06-28 | End: 2021-09-23 | Stop reason: SDUPTHER

## 2021-06-28 RX ORDER — LANOLIN ALCOHOL/MO/W.PET/CERES
325 CREAM (GRAM) TOPICAL EVERY OTHER DAY
Qty: 30 TABLET | Refills: 0 | Status: SHIPPED | OUTPATIENT
Start: 2021-06-28 | End: 2022-03-19

## 2021-06-28 NOTE — PROGRESS NOTES
Laure Oliver is a 60 y.o. female presenting today for New Patient (establish care) and Medication Refill  . Chief Complaint   Patient presents with    New Patient     establish care    Medication Refill       HPI:  Laure Oliver presents to the office today to establish care with a new provider. She carries a medical diagnosis for CVA, hypertension, seizure disorder, hyperlipidemia, and anemia. Patient presents today requesting medication refills. Patient had a overdose CVA in 2011 has right-sided hemoparesis. She takes clopidogrel daily. Hypertension-/81. Denies any chest pain, palpitation, headache or dizziness. Prescribed lisinopril and amlodipine daily. Patient has a history of seizure disorder. Currently not followed by neurology but notes she has been prescribed Keppra 500 mg tablet. Hyperlipidemia-recent lipid panel on May 30, 2021. Her cholesterol was 115, HDL 33 and LDL 57. Compliant with taking atorvastatin 40 mg tablet daily. ROS  ROS:  History obtained from the patient intake forms which are reviewed with the patient  · General: negative for - chills, fever, weight changes or malaise  · HEENT: no sore throat, nasal congestion, vision problems or ear problems  · Respiratory: no cough, shortness of breath, or wheezing  · Cardiovascular: no chest pain, palpitations, or dyspnea on exertion  · Gastrointestinal: no abdominal pain, N/V, change in bowel habits, or black or bloody stools  · Musculoskeletal: no back pain, joint pain, joint stiffness, muscle pain or muscle weakness  · Neurological: no numbness, tingling, headache or dizziness  · Endo:  No polyuria or polydipsia. · : no hematuria, dysuria, frequency, hesitancy, or nocturia.     · Psychological: negative for - anxiety, depression, sleep disturbances, suicidal or homicidal ideations    Allergies   Allergen Reactions    Aggrenox [Aspirin-Dipyridamole] Swelling       PHQ Screening   3 most recent PHQ Screens 6/28/2021 Little interest or pleasure in doing things Not at all   Feeling down, depressed, irritable, or hopeless Not at all   Total Score PHQ 2 0       History  Past Medical History:   Diagnosis Date    [facility]     HLD (hyperlipidemia)     HTN (hypertension)     Right hemipares[facility] ([facility]     Seizure ([facility]        History reviewed. No pertinent surgical history. Social History     Socioeconomic History    Marital status: SINGLE     Spouse name: Not on file    Number of children: Not on file    Years of education: Not on file    Highest education level: Not on file   Occupational History    Not on file   Tobacco Use    Smoking status: Never Smoker    Smokeless tobacco: Never Used   Substance and Sexual Activity    Alcohol use: Never    Drug use: Never    Sexual activity: Not Currently   Other Topics Concern    Not on file   Social History Narrative    Not on file     Social Determinants of Health     Financial Resource Strain:     Difficulty of Paying Living Expenses:    Food Insecurity:     Worried About Running Out of Food in the Last Year:     [address] in the Last Year:    Transportation Needs:     Lack of Transportation (Medical):      Lack of Transportation (Non-Medical):    Physical Activity:     Days of Exercise per Week:     Minutes of Exercise per Session:    Stress:     Feeling of Stress :    Social Connections:     Frequency of Communication with Friends and Family:     Frequency of Social Gatherings with Friends and Family:     Attends Buddhist Services:     Active Member of Clubs or Organizations:     Attends Club or Organization Meetings:     Marital Status:    Intimate Partner Violence:     Fear of Current or Ex-Partner:     Emotionally Abused:     Physically Abused:     Sexually Abused:        Current Outpatient Medications   Medication Sig Dispense Refill    potassium chloride (K-DUR, KLOR-CON) 20 mEq tablet Take 1 Tablet by mouth two (2) times a day. 30 Tablet 2    lisinopriL (PRINIVIL, ZESTRIL) 20 mg tablet Take 1 Tablet by mouth daily. 30 Tablet 2    levETIRAcetam (Keppra) 500 mg tablet Take 1 Tablet by mouth two (2) times a day. 60 Tablet 2    ferrous sulfate 325 mg (65 mg iron) tablet Take 1 Tablet by mouth every other day. 30 Tablet 0    clopidogreL (Plavix) 75 mg tab Take 1 Tablet by mouth daily. 30 Tablet 2    atorvastatin (LIPITOR) 40 mg tablet Take 1 Tablet by mouth daily. 30 Tablet 2    aspirin delayed-release 81 mg tablet Take 1 Tablet by mouth daily. 30 Tablet 2    amLODIPine (NORVASC) 10 mg tablet Take 1 Tablet by mouth daily. 30 Tablet 2         Vitals:    06/28/21 0819   BP: 124/81   Pulse: (!) 106   Resp: 16   Temp: (!) 95.8 °F (35.4 °C)   TempSrc: Oral   SpO2: 97%   Weight: 148 lb (67.1 kg)   Height: 5' 6\" (1.676 m)   PainSc:   0 - No pain       Physical Exam  Vitals and nursing note reviewed. Constitutional:       Appearance: Normal appearance. HENT:      Head: Normocephalic. Cardiovascular:      Rate and Rhythm: Normal rate and regular rhythm. Pulses: Normal pulses. Heart sounds: Normal heart sounds. Pulmonary:      Effort: Pulmonary effort is normal.      Breath sounds: Normal breath sounds. Abdominal:      General: Bowel sounds are normal.      Palpations: Abdomen is soft. Skin:     General: Skin is warm and dry. Neurological:      Mental Status: She is alert.       Comments: Right side hemiparesis  History of seizure disorder   Psychiatric:         Mood and Affect: Mood normal.         Admission on 05/30/2021, Discharged on 05/31/2021   Component Date Value Ref Range Status    WBC 05/30/2021 7.9  4.6 - 13.2 K/uL Final    RBC 05/30/2021 4.54  4.20 - 5.30 M/uL Final    HGB 05/30/2021 11.3* 12.0 - 16.0 g/dL Final    HCT 05/30/2021 36.3  35.0 - 45.0 % Final    MCV 05/30/2021 80.0  74.0 - 97.0 FL Final    MCH 05/30/2021 24.9  24.0 - 34.0 PG Final    [facility] 05/30/2021 31.1  31.0 - 37.0 g/dL Final    RDW 05/30/2021 14.6* 11.6 - 14.5 % Final    PLATELET 49/38/1975 975  135 - 420 K/uL Final    MPV 05/30/2021 8.7* 9.2 - 11.8 FL Final    NEUTROPHILS 05/30/2021 40  40 - 73 % Final    LYMPHOCYTES 05/30/2021 51  21 - 52 % Final    MONOCYTES 05/30/2021 8  3 - 10 % Final    EOSINOPHILS 05/30/2021 1  0 - 5 % Final    BASOPHILS 05/30/2021 0  0 - 2 % Final    ABS. NEUTROPHILS 05/30/2021 3.1  1.8 - 8.0 K/UL Final    ABS. LYMPHOCYTES 05/30/2021 4.0* 0.9 - 3.6 K/UL Final    ABS. MONOCYTES 05/30/2021 0.6  0.05 - 1.2 K/UL Final    ABS. EOSINOPHILS 05/30/2021 0.1  0.0 - 0.4 K/UL Final    ABS. BASOPHILS 05/30/2021 0.0  0.0 - 0.1 K/UL Final    DF 05/30/2021 AUTOMATED    Final    Sodium 05/30/2021 139  136 - 145 mmol/L Final    Potassium 05/30/2021 4.1  3.5 - 5.5 mmol/L Final    SLIGHTLY HEMOLYZED SPECIMEN    Chloride 05/30/2021 106  100 - 111 mmol/L Final    CO2 05/30/2021 26  21 - 32 mmol/L Final    Anion gap 05/30/2021 7  3.0 - 18 mmol/L Final    Glucose 05/30/2021 116* 74 - 99 mg/dL Final    BUN 05/30/2021 4* 7.0 - 18 MG/DL Final    Creatinine 05/30/2021 0.71  0.6 - 1.3 MG/DL Final    BUN/Creatinine ratio 05/30/2021 6* 12 - 20   Final    GFR est AA 05/30/2021 >60  >60 ml/min/1.73m2 Final    GFR est non-AA 05/30/2021 >60  >60 ml/min/1.73m2 Final    Comment: (NOTE)  Estimated GFR is calculated using the Modification of Diet in Renal   Disease (MDRD) Study equation, reported for both  Americans   (GFRAA) and non- Americans (GFRNA), and normalized to 1.73m2   body surface area. The physician must decide which value applies to   the patient. The MDRD study equation should only be used in   individuals age 25 or older. It has not been validated for the   following: pregnant women, patients with serious comorbid conditions,   or on certain medications, or persons with extremes of body size,   muscle mass, or nutritional status.       Calcium 05/30/2021 8.2* 8.5 - 10.1 MG/DL Final    Bilirubin, total 05/30/2021 0.4  0.2 - 1.0 MG/DL Final    ALT (SGPT) 05/30/2021 17  13 - 56 U/L Final    AST (SGOT) 05/30/2021 39* 10 - 38 U/L Final    SLIGHTLY HEMOLYZED SPECIMEN    Alk. phosphatase 05/30/2021 88  45 - 117 U/L Final    Protein, total 05/30/2021 9.0* 6.4 - 8.2 g/dL Final    Albumin 05/30/2021 3.3* 3.4 - 5.0 g/dL Final    Globulin 05/30/2021 5.7* 2.0 - 4.0 g/dL Final    A-G Ratio 05/30/2021 0.6* 0.8 - 1.7   Final    Prothrombin time 05/30/2021 12.5  11.5 - 15.2 sec Final    INR 05/30/2021 0.9  0.8 - 1.2   Final    Comment:            INR Therapeutic Ranges         (on stable oral anticoagulant):     INDICATION                INR  DVT/PE/Atrial Fib          2.0-3.0  MI/Mechanical Heart Valve  2.5-3.5      Ventricular Rate 05/30/2021 92  BPM Final    Atrial Rate 05/30/2021 92  BPM Final    P-R Interval 05/30/2021 190  ms Final    QRS Duration 05/30/2021 72  ms Final    Q-T Interval 05/30/2021 390  ms Final    QTC Calculation (Bezet) 05/30/2021 482  ms Final    Calculated P Axis 05/30/2021 41  degrees Final    Calculated R Axis 05/30/2021 17  degrees Final    Calculated T Axis 05/30/2021 45  degrees Final    Diagnosis 05/30/2021    Final                    Value:Normal sinus rhythm  Prolonged QT  Abnormal ECG  No previous ECGs available  Confirmed by Kelli Chicas MD, ----- ([id number]) on 5/31/2021 9:31:50 AM      Glucose (POC) 05/30/2021 140* 70 - 110 mg/dL Final    Comment: (NOTE)  The FDA has indicated that no capillary point of care blood glucose   monitoring systems are approved for use in \"critically ill\" patients,   however they have not defined this population. The College of   American Pathologists has recommended that these devices should not   be used in cases such as severe hypotension, dehydration, shock, and   hyperglycemic-hyperosmolar state, amongst others. Venous or arterial   collection is the recommended specimen for testing these patients.       Levetiracetam (Keppra) 05/30/2021 9.5* 10.0 - 40.0 ug/mL Final    Comment: (NOTE)  This test was developed and its performance characteristics  determined by Angélica Castañeda. It has not been cleared or approved  by the Food and Drug Administration. Performed At: [facility] [address] [place], [place] [id number]  Myla Castleman MD HERRON:[id number]     [id number] [facility] 05/30/2021        Final    Cholesterol, total 05/30/2021 115  <200 MG/DL Final    Triglyceride 05/30/2021 125  <150 MG/DL Final    Comment: The drugs N-acetylcysteine (NAC) and  Metamiszole have been found to cause falsely  low results in this chemical assay. Please  be sure to submit blood samples obtained  BEFORE administration of either of these  drugs to assure correct results.  HDL Cholesterol 05/30/2021 33* 40 - 60 MG/DL Final    LDL, calculated 05/30/2021 57  0 - 100 MG/DL Final    VLDL, calculated 05/30/2021 25  MG/DL Final    CHOL/HDL Ratio 05/30/2021 3.5  0 - 5.0   Final    Hemoglobin A1c 05/30/2021 5.7* 4.2 - 5.6 % Final    Comment: (NOTE)  HbA1C Interpretive Ranges  <5.7              Normal  5.7 - 6.4         Consider Prediabetes  >6.5              Consider Diabetes      Est. average glucose 05/30/2021 117  mg/dL Final    Comment: (NOTE)  The eAG should be interpreted with patient characteristics in mind   since ethnicity, interindividual differences, red cell lifespan,   variation in rates of glycation, etc. may affect the validity of the   calculation.       WBC 05/31/2021 7.5  4.6 - 13.2 K/uL Final    RBC 05/31/2021 4.27  4.20 - 5.30 M/uL Final    HGB 05/31/2021 10.7* 12.0 - 16.0 g/dL Final    HCT 05/31/2021 34.3* 35.0 - 45.0 % Final    MCV 05/31/2021 80.3  74.0 - 97.0 FL Final    MCH 05/31/2021 25.1  24.0 - 34.0 PG Final    MCHC 05/31/2021 31.2  31.0 - 37.0 g/dL Final    RDW 05/31/2021 14.6* 11.6 - 14.5 % Final    PLATELET 64/12/9536 629  135 - 420 K/uL Final    MPV 05/31/2021 9.1* 9.2 - 11.8 FL Final    NEUTROPHILS 05/31/2021 41  40 - 73 % Final    LYMPHOCYTES 05/31/2021 47  21 - 52 % Final    MONOCYTES 05/31/2021 10  3 - 10 % Final    EOSINOPHILS 05/31/2021 1  0 - 5 % Final    BASOPHILS 05/31/2021 0  0 - 2 % Final    ABS. NEUTROPHILS 05/31/2021 3.1  1.8 - 8.0 K/UL Final    ABS. LYMPHOCYTES 05/31/2021 3.6  0.9 - 3.6 K/UL Final    ABS. MONOCYTES 05/31/2021 0.7  0.05 - 1.2 K/UL Final    ABS. EOSINOPHILS 05/31/2021 0.1  0.0 - 0.4 K/UL Final    ABS. BASOPHILS 05/31/2021 0.0  0.0 - 0.1 K/UL Final    DF 05/31/2021 AUTOMATED    Final    Sodium 05/31/2021 136  136 - 145 mmol/L Final    Potassium 05/31/2021 3.4* 3.5 - 5.5 mmol/L Final    Chloride 05/31/2021 104  100 - 111 mmol/L Final    CO2 05/31/2021 27  21 - 32 mmol/L Final    Anion gap 05/31/2021 5  3.0 - 18 mmol/L Final    Glucose 05/31/2021 85  74 - 99 mg/dL Final    BUN 05/31/2021 4* 7.0 - 18 MG/DL Final    Creatinine 05/31/2021 0.58* 0.6 - 1.3 MG/DL Final    BUN/Creatinine ratio 05/31/2021 7* 12 - 20   Final    GFR est AA 05/31/2021 >60  >60 ml/min/1.73m2 Final    GFR est non-AA 05/31/2021 >60  >60 ml/min/1.73m2 Final    Calcium 05/31/2021 8.8  8.5 - 10.1 MG/DL Final    Bilirubin, total 05/31/2021 0.7  0.2 - 1.0 MG/DL Final    ALT (SGPT) 05/31/2021 15  13 - 56 U/L Final    AST (SGOT) 05/31/2021 23  10 - 38 U/L Final    Alk. phosphatase 05/31/2021 79  45 - 117 U/L Final    Protein, total 05/31/2021 8.8* 6.4 - 8.2 g/dL Final    Albumin 05/31/2021 3.2* 3.4 - 5.0 g/dL Final    Globulin 05/31/2021 5.6* 2.0 - 4.0 g/dL Final    A-G Ratio 05/31/2021 0.6* 0.8 - 1.7   Final    Glucose (POC) 05/31/2021 87  70 - 110 mg/dL Final    Comment: (NOTE)  The FDA has indicated that no capillary point of care blood glucose   monitoring systems are approved for use in \"critically ill\" patients,   however they have not defined this population.  The College of   American Pathologists has recommended that these devices should not   be used in cases such as severe hypotension, dehydration, shock, and   hyperglycemic-hyperosmolar state, amongst others. Venous or arterial   collection is the recommended specimen for testing these patients. No results found for any visits on 06/28/21. Patient Care Team:  Patient Care Team:  Jalyn Clarke NP as PCP - General (Nurse Practitioner)  Jalyn Clarke NP as PCP - Yosvany Alvarenga Provider      Assessment / Plan:      ICD-10-CM ICD-9-CM    1. Encounter for screening mammogram for malignant neoplasm of breast  Z12.31 V76.12 AHMET MAMMO BI SCREENING INCL CAD   2. Colon cancer screening  Z12.11 V76.51 REFERRAL TO COLON AND RECTAL SURGERY   3. Essential hypertension  I10 401.9 potassium chloride (K-DUR, KLOR-CON) 20 mEq tablet      lisinopriL (PRINIVIL, ZESTRIL) 20 mg tablet      amLODIPine (NORVASC) 10 mg tablet      METABOLIC PANEL, COMPREHENSIVE      CBC WITH AUTOMATED DIFF   4. Mixed hyperlipidemia  E78.2 272.2 atorvastatin (LIPITOR) 40 mg tablet   5. CVA, old, hemiparesis ([facility]  I69.359 438.20 clopidogreL (Plavix) 75 mg tab      aspirin delayed-release 81 mg tablet      LIPID PANEL      REFERRAL TO NEUROLOGY   6. Seizure (HCC)  R56.9 780.39 levETIRAcetam (Keppra) 500 mg tablet      REFERRAL TO NEUROLOGY   7. Anemia, unspecified type  D64.9 285.9 ferrous sulfate 325 mg (65 mg iron) tablet   8. Encounter for hepatitis C screening test for low risk patient  Z11.59 V73.89 HEPATITIS C AB     Referral to neurology  Fasting labs ordered  Medication refills  Mammogram ordered  Referral colonoscopy  Follow-up and Dispositions    · Return in about 3 months (around 9/28/2021). I asked the patient if she  had any questions and answered her  questions. The patient stated that she understands the treatment plan and agrees with the treatment plan    This document was created with a voice activated dictation system and may contain transcription errors.

## 2021-06-28 NOTE — PROGRESS NOTES
Vlad Valle presents today for   Chief Complaint   Patient presents with    New Patient     establish care    Medication Refill       Is someone accompanying this pt? no    Is the patient using any DME equipment during OV? no    Depression Screening:  3 most recent PHQ Screens 6/28/2021   Little interest or pleasure in doing things Not at all   Feeling down, depressed, irritable, or hopeless Not at all   Total Score PHQ 2 0       Learning Assessment:  Learning Assessment 6/28/2021   PRIMARY LEARNER Patient   HIGHEST LEVEL OF EDUCATION - PRIMARY LEARNER  GRADUATED HIGH SCHOOL OR GED   BARRIERS PRIMARY LEARNER NONE   PRIMARY LANGUAGE ENGLISH   LEARNER PREFERENCE PRIMARY READING   ANSWERED BY patient   RELATIONSHIP SELF       Health Maintenance reviewed and discussed and ordered per Provider. Health Maintenance Due   Topic Date Due    Hepatitis C Screening  Never done    DTaP/Tdap/Td series (1 - Tdap) Never done    PAP AKA CERVICAL CYTOLOGY  Never done    Shingrix Vaccine Age 50> (1 of 2) Never done    Colorectal Cancer Screening Combo  Never done    Breast Cancer Screen Mammogram  Never done   . Coordination of Care:  1. Have you been to the ER, urgent care clinic since your last visit? Hospitalized since your last visit? no    2. Have you seen or consulted any other health care providers outside of the [address] [place] [address] since your last visit? Include any pap smears or colon screening.  no

## 2021-09-23 DIAGNOSIS — I69.359 CVA, OLD, HEMIPARESIS (HCC): ICD-10-CM

## 2021-09-23 DIAGNOSIS — E78.2 MIXED HYPERLIPIDEMIA: ICD-10-CM

## 2021-09-23 DIAGNOSIS — I10 ESSENTIAL HYPERTENSION: ICD-10-CM

## 2021-09-28 RX ORDER — ATORVASTATIN CALCIUM 40 MG/1
40 TABLET, FILM COATED ORAL DAILY
Qty: 30 TABLET | Refills: 2 | Status: SHIPPED | OUTPATIENT
Start: 2021-09-28 | End: 2021-12-15

## 2021-09-28 RX ORDER — AMLODIPINE BESYLATE 10 MG/1
10 TABLET ORAL DAILY
Qty: 30 TABLET | Refills: 2 | Status: SHIPPED | OUTPATIENT
Start: 2021-09-28 | End: 2021-12-15

## 2021-09-28 RX ORDER — CLOPIDOGREL BISULFATE 75 MG/1
75 TABLET ORAL DAILY
Qty: 30 TABLET | Refills: 2 | Status: SHIPPED | OUTPATIENT
Start: 2021-09-28 | End: 2021-12-15

## 2021-09-28 RX ORDER — ASPIRIN 81 MG/1
81 TABLET ORAL DAILY
Qty: 30 TABLET | Refills: 2 | Status: SHIPPED | OUTPATIENT
Start: 2021-09-28 | End: 2021-12-15

## 2021-10-13 ENCOUNTER — OFFICE VISIT (OUTPATIENT)
Dept: FAMILY MEDICINE CLINIC | Age: 56
End: 2021-10-13
Payer: MEDICAID

## 2021-10-13 VITALS
WEIGHT: 165 LBS | DIASTOLIC BLOOD PRESSURE: 86 MMHG | HEART RATE: 112 BPM | RESPIRATION RATE: 16 BRPM | TEMPERATURE: 97 F | BODY MASS INDEX: 26.52 KG/M2 | SYSTOLIC BLOOD PRESSURE: 122 MMHG | HEIGHT: 66 IN | OXYGEN SATURATION: 97 %

## 2021-10-13 DIAGNOSIS — G89.29 CHRONIC FOOT PAIN, RIGHT: ICD-10-CM

## 2021-10-13 DIAGNOSIS — M79.671 CHRONIC FOOT PAIN, RIGHT: ICD-10-CM

## 2021-10-13 DIAGNOSIS — M79.674 CHRONIC PAIN OF TOE, RIGHT: ICD-10-CM

## 2021-10-13 DIAGNOSIS — M79.641 PAIN OF RIGHT HAND: ICD-10-CM

## 2021-10-13 DIAGNOSIS — R73.03 PREDIABETES: ICD-10-CM

## 2021-10-13 DIAGNOSIS — G89.29 CHRONIC PAIN OF TOE, RIGHT: ICD-10-CM

## 2021-10-13 DIAGNOSIS — M24.541 CONTRACTURE OF HAND JOINT, RIGHT: Primary | ICD-10-CM

## 2021-10-13 DIAGNOSIS — R56.9 SEIZURE (HCC): ICD-10-CM

## 2021-10-13 DIAGNOSIS — I10 ESSENTIAL HYPERTENSION: ICD-10-CM

## 2021-10-13 LAB — HBA1C MFR BLD HPLC: 5.5 %

## 2021-10-13 PROCEDURE — 83036 HEMOGLOBIN GLYCOSYLATED A1C: CPT | Performed by: NURSE PRACTITIONER

## 2021-10-13 PROCEDURE — 99214 OFFICE O/P EST MOD 30 MIN: CPT | Performed by: NURSE PRACTITIONER

## 2021-10-13 RX ORDER — LEVETIRACETAM 500 MG/1
500 TABLET ORAL 2 TIMES DAILY
Qty: 60 TABLET | Refills: 2 | Status: SHIPPED | OUTPATIENT
Start: 2021-10-13 | End: 2022-01-27

## 2021-10-13 RX ORDER — LEVETIRACETAM 500 MG/1
500 TABLET ORAL 2 TIMES DAILY
Qty: 180 TABLET | Refills: 0 | Status: CANCELLED | OUTPATIENT
Start: 2021-10-13

## 2021-10-13 RX ORDER — LISINOPRIL 20 MG/1
20 TABLET ORAL DAILY
Qty: 90 TABLET | Refills: 1 | Status: CANCELLED | OUTPATIENT
Start: 2021-10-13

## 2021-10-13 RX ORDER — LISINOPRIL 20 MG/1
20 TABLET ORAL DAILY
Qty: 30 TABLET | Refills: 2 | Status: SHIPPED | OUTPATIENT
Start: 2021-10-13 | End: 2022-01-13

## 2021-10-13 NOTE — PROGRESS NOTES
Maria Isabel Knight presents today for   Chief Complaint   Patient presents with    Hypertension     follow-up    Joint Pain    Decreased Appetite       Is someone accompanying this pt? no    Is the patient using any DME equipment during OV? no    Depression Screening:  3 most recent PHQ Screens 10/13/2021   Little interest or pleasure in doing things Not at all   Feeling down, depressed, irritable, or hopeless Not at all   Total Score PHQ 2 0       Learning Assessment:  Learning Assessment 6/28/2021   PRIMARY LEARNER Patient   HIGHEST LEVEL OF EDUCATION - PRIMARY LEARNER  GRADUATED HIGH SCHOOL OR GED   BARRIERS PRIMARY LEARNER NONE   PRIMARY LANGUAGE ENGLISH   LEARNER PREFERENCE PRIMARY READING   ANSWERED BY patient   RELATIONSHIP SELF       Health Maintenance reviewed and discussed and ordered per Provider. Health Maintenance Due   Topic Date Due    Hepatitis C Screening  Never done    DTaP/Tdap/Td series (1 - Tdap) Never done    Cervical cancer screen  Never done    Colorectal Cancer Screening Combo  Never done    Shingrix Vaccine Age 50> (1 of 2) Never done    Breast Cancer Screen Mammogram  Never done   . Coordination of Care:  1. Have you been to the ER, urgent care clinic since your last visit? Hospitalized since your last visit? no    2. Have you seen or consulted any other health care providers outside of the [address] [place] [address] since your last visit? Include any pap smears or colon screening.  no

## 2021-10-14 NOTE — PROGRESS NOTES
Trinidad Cobb is a 60 y.o. female presenting today for Hypertension (follow-up), Joint Pain, and Decreased Appetite  . Chief Complaint   Patient presents with    Hypertension     follow-up    Joint Pain    Decreased Appetite       HPI:  Trinidad Cobb presents to the office today for routine follow-up care. She carries a medical diagnosis for CVA, hypertension, seizure disorder, hyperlipidemia, and anemia. Patient presents today requesting medication refills. Patient had a  CVA in 2011 and has right-sided hemoparesis. She is complaining of dorsal right hand pain. She describes the pain as a thumbing sensation. She is also complaining of right foot, second third, fourth and fifth digit pain. She previously had surgery on her right foot (great toe). Hypertension-/86. Denies any chest pain, palpitation, headache or dizziness. Prescribed lisinopril and amlodipine daily. Patient has a history of seizure disorder. Currently not followed by neurology but notes she has been prescribed Keppra 500 mg tablet. She was given a referral to nephrology in June, 2021  Had appointment. Hyperlipidemia-compliant with statin therapy aily. Review of Systems   Musculoskeletal: Positive for joint pain. ROS:  History obtained from the patient intake forms which are reviewed with the patient  · General: negative for - chills, fever, weight changes or malaise  · HEENT: no sore throat, nasal congestion, vision problems or ear problems  · Respiratory: no cough, shortness of breath, or wheezing  · Cardiovascular: no chest pain, palpitations, or dyspnea on exertion  · Gastrointestinal: no abdominal pain, N/V, change in bowel habits, or black or bloody stools  · Musculoskeletal: no back pain, joint pain, joint stiffness, muscle pain or muscle weakness  · Neurological: no numbness, tingling, headache or dizziness  · Endo:  No polyuria or polydipsia.    · : no hematuria, dysuria, frequency, hesitancy, or nocturia. · Psychological: negative for - anxiety, depression, sleep disturbances, suicidal or homicidal ideations    Allergies   Allergen Reactions    Aggrenox [Aspirin-Dipyridamole] Swelling       PHQ Screening   3 most recent PHQ Screens 10/13/2021   Little interest or pleasure in doing things Not at all   Feeling down, depressed, irritable, or hopeless Not at all   Total Score PHQ 2 0       History  Past Medical History:   Diagnosis Date    [facility]ce[facility] accident[facility] ([facility]     HLD (hyperlipidemia)     HTN (hypertension)     Right hemiparesis ([facility]     Seizure ([facility]        History reviewed. No pertinent surgical history. Social History     Socioeconomic History    Marital status: SINGLE     Spouse name: Not on file    Number of children: Not on file    Years of education: Not on file    Highest education level: Not on file   Occupational History    Not on file   Tobacco Use    Smoking status: Never Smoker    Smokeless tobacco: Never Used   Substance and Sexual Activity    Alcohol use: Never    Drug use: Never    Sexual activity: Not Currently   Other Topics Concern    Not on file   Social History Narrative    Not on file     Social Determinants of Health     Financial Resource Strain:     Difficulty of Paying Living Expenses:    Food Insecurity:     Worried About Running Out of Food in the Last Year:     [address] in the Last Year:    Transportation Needs:     Lack of Transportation (Medical):      Lack of Transportation (Non-Medical):    Physical Activity:     Days of Exercise per Week:     Minutes of Exercise per Session:    Stress:     Feeling of Stress :    Social Connections:     Frequency of Communication with Friends and Family:     Frequency of Social Gatherings with Friends and Family:     Attends Religion Services:     Active Member of Clubs or Organizations:     Attends Club or Organization Meetings:     Marital Status:    Intimate Partner Violence:  Fear of Current or Ex-Partner:     Emotionally Abused:     Physically Abused:     Sexually Abused:        Current Outpatient Medications   Medication Sig Dispense Refill    lisinopriL (PRINIVIL, ZESTRIL) 20 mg tablet Take 1 Tablet by mouth daily. 30 Tablet 2    levETIRAcetam (Keppra) 500 mg tablet Take 1 Tablet by mouth two (2) times a day. 60 Tablet 2    amLODIPine (NORVASC) 10 mg tablet Take 1 Tablet by mouth daily. 30 Tablet 2    clopidogreL (Plavix) 75 mg tab Take 1 Tablet by mouth daily. 30 Tablet 2    aspirin delayed-release 81 mg tablet Take 1 Tablet by mouth daily. 30 Tablet 2    atorvastatin (LIPITOR) 40 mg tablet Take 1 Tablet by mouth daily. 30 Tablet 2    ferrous sulfate 325 mg (65 mg iron) tablet Take 1 Tablet by mouth every other day. 30 Tablet 0    potassium chloride (K-DUR, KLOR-CON) 20 mEq tablet Take 1 Tablet by mouth two (2) times a day. (Patient not taking: Reported on 10/13/2021) 30 Tablet 2         Vitals:    10/13/21 1111   BP: 122/86   Pulse: (!) 112   Resp: 16   Temp: 97 °F (36.1 °C)   TempSrc: Oral   SpO2: 97%   Weight: 165 lb (74.8 kg)   Height: 5' 6\" (1.676 m)   PainSc:   0 - No pain       Physical Exam  Vitals and nursing note reviewed. Constitutional:       Appearance: Normal appearance. HENT:      Head: Normocephalic. Cardiovascular:      Rate and Rhythm: Normal rate and regular rhythm. Pulses: Normal pulses. Heart sounds: Normal heart sounds. Pulmonary:      Effort: Pulmonary effort is normal.      Breath sounds: Normal breath sounds. Abdominal:      General: Bowel sounds are normal.      Palpations: Abdomen is soft. Skin:     General: Skin is warm and dry. Neurological:      Mental Status: She is alert.       Comments: Right side hemiparesis  History of seizure disorder   Psychiatric:         Mood and Affect: Mood normal.         Office Visit on 10/13/2021   Component Date Value Ref Range Status    Hemoglobin A1c (POC) 10/13/2021 5.5  % Final Results for orders placed or performed in visit on 10/13/21   AMB POC HEMOGLOBIN A1C   Result Value Ref Range    Hemoglobin A1c (POC) 5.5 %       Patient Care Team:  Patient Care Team:  Silvio Rodriges NP as PCP - General (Nurse Practitioner)  Silvio Rodriges NP as PCP - [facility]      Assessment / Plan:      ICD-10-CM ICD-9-CM    1. Contracture of hand joint, right  M24.541 718.44 REFERRAL TO PHYSICAL THERAPY   2. Seizure (HCC)  R56.9 780.39 levETIRAcetam (Keppra) 500 mg tablet   3. Essential hypertension  I10 401.9 lisinopriL (PRINIVIL, ZESTRIL) 20 mg tablet   4. Chronic pain of toe, right  M79.674 729.5 REFERRAL TO PODIATRY    G89.29 338.29    5. Prediabetes  R73.03 790.29 AMB POC HEMOGLOBIN A1C   6. Pain of right hand  M79.641 729.5 REFERRAL TO PHYSICAL THERAPY   7. Chronic foot pain, right  M79.671 729.5     G89.29 338.29      Information given regarding neurology referral  Fasting labs ordered  Medication refills  Mammogram ordered  Referral colonoscopy  Follow-up and Dispositions    · Return in about 4 months (around 2/13/2022). I asked the patient if she  had any questions and answered her  questions. The patient stated that she understands the treatment plan and agrees with the treatment plan    This document was created with a voice activated dictation system and may contain transcription errors.

## 2021-11-30 ENCOUNTER — TELEPHONE (OUTPATIENT)
Dept: FAMILY MEDICINE CLINIC | Age: 56
End: 2021-11-30

## 2021-12-02 ENCOUNTER — APPOINTMENT (OUTPATIENT)
Dept: GENERAL RADIOLOGY | Age: 56
End: 2021-12-02
Attending: NURSE PRACTITIONER
Payer: COMMERCIAL

## 2021-12-02 ENCOUNTER — HOSPITAL ENCOUNTER (EMERGENCY)
Age: 56
Discharge: HOME | End: 2021-12-03
Attending: STUDENT IN AN ORGANIZED HEALTH CARE EDUCATION/TRAINING PROGRAM
Payer: COMMERCIAL

## 2021-12-02 ENCOUNTER — APPOINTMENT (OUTPATIENT)
Dept: CT IMAGING | Age: 56
End: 2021-12-02
Attending: NURSE PRACTITIONER
Payer: COMMERCIAL

## 2021-12-02 VITALS
HEART RATE: 94 BPM | BODY MASS INDEX: 24.91 KG/M2 | DIASTOLIC BLOOD PRESSURE: 82 MMHG | HEIGHT: 66 IN | OXYGEN SATURATION: 100 % | WEIGHT: 155 LBS | SYSTOLIC BLOOD PRESSURE: 109 MMHG | RESPIRATION RATE: 16 BRPM | TEMPERATURE: 97.5 F

## 2021-12-02 DIAGNOSIS — K76.9 LIVER LESION: ICD-10-CM

## 2021-12-02 DIAGNOSIS — R10.31 ABDOMINAL PAIN, RIGHT LOWER QUADRANT: ICD-10-CM

## 2021-12-02 DIAGNOSIS — E87.6 HYPOKALEMIA: Primary | ICD-10-CM

## 2021-12-02 DIAGNOSIS — K59.00 CONSTIPATION, UNSPECIFIED CONSTIPATION TYPE: ICD-10-CM

## 2021-12-02 DIAGNOSIS — K44.9 HIATAL HERNIA: ICD-10-CM

## 2021-12-02 LAB
ALBUMIN SERPL-MCNC: 3.5 G/DL (ref 3.4–5)
ALBUMIN/GLOB SERPL: 0.5 {RATIO} (ref 0.8–1.7)
ALP SERPL-CCNC: 143 U/L (ref 45–117)
ALT SERPL-CCNC: 25 U/L (ref 13–56)
ANION GAP SERPL CALC-SCNC: 6 MMOL/L (ref 3–18)
AST SERPL-CCNC: 47 U/L (ref 10–38)
BASOPHILS # BLD: 0 K/UL (ref 0–0.1)
BASOPHILS NFR BLD: 0 % (ref 0–2)
BILIRUB SERPL-MCNC: 0.5 MG/DL (ref 0.2–1)
BUN SERPL-MCNC: 4 MG/DL (ref 7–18)
BUN/CREAT SERPL: 5 (ref 12–20)
CALCIUM SERPL-MCNC: 9.1 MG/DL (ref 8.5–10.1)
CHLORIDE SERPL-SCNC: 104 MMOL/L (ref 100–111)
CO2 SERPL-SCNC: 27 MMOL/L (ref 21–32)
CREAT SERPL-MCNC: 0.86 MG/DL (ref 0.6–1.3)
DIFFERENTIAL METHOD BLD: ABNORMAL
EOSINOPHIL # BLD: 0.2 K/UL (ref 0–0.4)
EOSINOPHIL NFR BLD: 3 % (ref 0–5)
ERYTHROCYTE [DISTWIDTH] IN BLOOD BY AUTOMATED COUNT: 14.3 % (ref 11.6–14.5)
GLOBULIN SER CALC-MCNC: 6.9 G/DL (ref 2–4)
GLUCOSE SERPL-MCNC: 132 MG/DL (ref 74–99)
HCT VFR BLD AUTO: 38.1 % (ref 35–45)
HEMOCCULT STL QL: POSITIVE
HGB BLD-MCNC: 12 G/DL (ref 12–16)
IMM GRANULOCYTES # BLD AUTO: 0 K/UL (ref 0–0.04)
IMM GRANULOCYTES NFR BLD AUTO: 0 % (ref 0–0.5)
LIPASE SERPL-CCNC: 274 U/L (ref 73–393)
LYMPHOCYTES # BLD: 2.3 K/UL (ref 0.9–3.6)
LYMPHOCYTES NFR BLD: 37 % (ref 21–52)
MAGNESIUM SERPL-MCNC: 2.1 MG/DL (ref 1.6–2.6)
MCH RBC QN AUTO: 25.4 PG (ref 24–34)
MCHC RBC AUTO-ENTMCNC: 31.5 G/DL (ref 31–37)
MCV RBC AUTO: 80.5 FL (ref 78–100)
MONOCYTES # BLD: 0.6 K/UL (ref 0.05–1.2)
MONOCYTES NFR BLD: 9 % (ref 3–10)
NEUTS SEG # BLD: 3 K/UL (ref 1.8–8)
NEUTS SEG NFR BLD: 50 % (ref 40–73)
NRBC # BLD: 0 K/UL (ref 0–0.01)
NRBC BLD-RTO: 0 PER 100 WBC
PLATELET # BLD AUTO: 358 K/UL (ref 135–420)
PMV BLD AUTO: 8.8 FL (ref 9.2–11.8)
POTASSIUM SERPL-SCNC: 2.7 MMOL/L (ref 3.5–5.5)
PROT SERPL-MCNC: 10.4 G/DL (ref 6.4–8.2)
RBC # BLD AUTO: 4.73 M/UL (ref 4.2–5.3)
SODIUM SERPL-SCNC: 137 MMOL/L (ref 136–145)
WBC # BLD AUTO: 6.1 K/UL (ref 4.6–13.2)

## 2021-12-02 PROCEDURE — 96365 THER/PROPH/DIAG IV INF INIT: CPT

## 2021-12-02 PROCEDURE — 99283 EMERGENCY DEPT VISIT LOW MDM: CPT

## 2021-12-02 PROCEDURE — 73630 X-RAY EXAM OF FOOT: CPT

## 2021-12-02 PROCEDURE — 74011000636 HC RX REV CODE- 636: Performed by: EMERGENCY MEDICINE

## 2021-12-02 PROCEDURE — 83690 ASSAY OF LIPASE: CPT

## 2021-12-02 PROCEDURE — 74011250637 HC RX REV CODE- 250/637: Performed by: NURSE PRACTITIONER

## 2021-12-02 PROCEDURE — 74177 CT ABD & PELVIS W/CONTRAST: CPT

## 2021-12-02 PROCEDURE — 82272 OCCULT BLD FECES 1-3 TESTS: CPT

## 2021-12-02 PROCEDURE — 93005 ELECTROCARDIOGRAM TRACING: CPT

## 2021-12-02 PROCEDURE — 74011250636 HC RX REV CODE- 250/636: Performed by: NURSE PRACTITIONER

## 2021-12-02 PROCEDURE — 85025 COMPLETE CBC W/AUTO DIFF WBC: CPT

## 2021-12-02 PROCEDURE — 80053 COMPREHEN METABOLIC PANEL: CPT

## 2021-12-02 PROCEDURE — 83735 ASSAY OF MAGNESIUM: CPT

## 2021-12-02 RX ORDER — POTASSIUM CHLORIDE 20 MEQ/1
40 TABLET, EXTENDED RELEASE ORAL
Status: COMPLETED | OUTPATIENT
Start: 2021-12-02 | End: 2021-12-02

## 2021-12-02 RX ORDER — POTASSIUM CHLORIDE 7.45 MG/ML
10 INJECTION INTRAVENOUS
Status: DISPENSED | OUTPATIENT
Start: 2021-12-02 | End: 2021-12-02

## 2021-12-02 RX ORDER — POTASSIUM CHLORIDE 20 MEQ/1
20 TABLET, EXTENDED RELEASE ORAL 3 TIMES DAILY
Qty: 9 TABLET | Refills: 0 | Status: SHIPPED | OUTPATIENT
Start: 2021-12-02 | End: 2021-12-05

## 2021-12-02 RX ADMIN — POTASSIUM CHLORIDE 40 MEQ: 1500 TABLET, EXTENDED RELEASE ORAL at 16:47

## 2021-12-02 RX ADMIN — SODIUM CHLORIDE 1000 ML: 900 INJECTION, SOLUTION INTRAVENOUS at 16:40

## 2021-12-02 RX ADMIN — IOPAMIDOL 100 ML: 612 INJECTION, SOLUTION INTRAVENOUS at 16:12

## 2021-12-02 RX ADMIN — POTASSIUM CHLORIDE 10 MEQ: 7.46 INJECTION, SOLUTION INTRAVENOUS at 16:47

## 2021-12-02 NOTE — ED TRIAGE NOTES
Pt reports constipation and abdominal times 2 weeks. Pt states he last bowel movement was Sunday and she noticed blood in her tools at that time. Pt also reports pain to the bottom of her right foot that is causing her to have trouble walking.

## 2021-12-02 NOTE — ED PROVIDER NOTES
EMERGENCY DEPARTMENT HISTORY AND PHYSICAL EXAM    Date: 12/2/2021  Patient Name: Yeni Blue    History of Presenting Illness     Chief Complaint   Patient presents with    Constipation    Abdominal Pain    Foot Pain     right        History Provided By: Patient      Additional History (Context): Yeni Blue is a 59 y/o F PMHx of CVA with R hemiparesis, HLD, HTN, seizure, presenting today for evaluation of episode of hematochezia, pt reports she noticed blood mixed in with stool Sunday 11/28/21, called PCP unable to be seen soon, came in for ER eval. She reports for the last 2 months she has been taking dulcolax but stopped 10 days ago. She reports diffuse abdominal pain, no n/v/d, no fever no chills, no urinary complaints. Of note she is reporting R foot pain, similar to prior, no injury no trauma. No prior colonoscopy. She is not on any blood thinners. Normal intake of oral liquids and solids with good appetite. PCP: Silvio Sender, ABBIE    Current Outpatient Medications   Medication Sig Dispense Refill    lisinopriL (PRINIVIL, ZESTRIL) 20 mg tablet Take 1 Tablet by mouth daily. 30 Tablet 2    levETIRAcetam (Keppra) 500 mg tablet Take 1 Tablet by mouth two (2) times a day. 60 Tablet 2    amLODIPine (NORVASC) 10 mg tablet Take 1 Tablet by mouth daily. 30 Tablet 2    clopidogreL (Plavix) 75 mg tab Take 1 Tablet by mouth daily. 30 Tablet 2    aspirin delayed-release 81 mg tablet Take 1 Tablet by mouth daily. 30 Tablet 2    atorvastatin (LIPITOR) 40 mg tablet Take 1 Tablet by mouth daily. 30 Tablet 2    potassium chloride (K-DUR, KLOR-CON) 20 mEq tablet Take 1 Tablet by mouth two (2) times a day. (Patient not taking: Reported on 10/13/2021) 30 Tablet 2    ferrous sulfate 325 mg (65 mg iron) tablet Take 1 Tablet by mouth every other day.  30 Tablet 0       Past History     Past Medical History:  Past Medical History:   Diagnosis Date    [facility]     HLD (hyperlipidemia)     HTN (hypertension)     Right hemiparesis (HCC)     Seizure [facility])        Past Surgical History:  No past surgical history on file. Family History:  No family history on file. Social History:  Social History     Tobacco Use    Smoking status: Never Smoker    Smokeless tobacco: Never Used   Substance Use Topics    Alcohol use: Never    Drug use: Never       Allergies: Allergies   Allergen Reactions    Aggrenox [Aspirin-Dipyridamole] Swelling         Review of Systems     Review of Systems   Constitutional: Negative for chills and fever. HENT: Negative for nasal congestion, sore throat, rhinorrhea  Eyes: Negative. Respiratory: negative  cough and negative for shortness of breath. Cardiovascular: Negative for chest pain and palpitations. Gastrointestinal: Positive for abdominal pain, blood in stool, and constipation. Negative for diarrhea, normal appetite and intake of solids and liquids. Not on any blood thinners. Nausea and vomiting. Genitourinary: Negative for difficulty urinating, hematuria, and flank pain. Musculoskeletal: Negative for back pain. Negative for gait problem and neck pain. Skin: Negative for rash. Allergic/Immunologic: Negative. Neurological: Negative for dizziness, weakness, numbness and headaches. Psychiatric/Behavioral: Negative. All other systems reviewed and are negative. All Other Systems Negative    Physical Exam     Vitals:    12/02/21 1307 12/02/21 1307 12/02/21 1841   BP:  109/82    Pulse:  (!) 115 94   Resp:  18 16   Temp:  97.5 °F (36.4 °C)    SpO2:  100%    Weight: 70.3 kg (155 lb)     Height: 5' 6\" (1.676 m)       Physical Exam  Vitals and nursing note reviewed. Exam conducted with a chaperone present. Constitutional:       General: She is not in acute distress. Appearance: Normal appearance. She is obese. She is not ill-appearing, toxic-appearing or diaphoretic. HENT:      Head: Normocephalic and atraumatic. Nose: Nose normal.      Mouth/Throat:      Mouth: Mucous membranes are moist.      Pharynx: Oropharynx is clear. Eyes:      General: Lids are normal. Vision grossly intact. Extraocular Movements: Extraocular movements intact. Conjunctiva/sclera: Conjunctivae normal.   Cardiovascular:      Rate and Rhythm: Regular rhythm. Tachycardia present. Pulses: Normal pulses. Heart sounds: Normal heart sounds. No murmur heard. No friction rub. No gallop. Pulmonary:      Effort: Pulmonary effort is normal. No respiratory distress. Breath sounds: Normal breath sounds. No stridor. No wheezing, rhonchi or rales. Chest:      Chest wall: No tenderness. Abdominal:      General: Abdomen is flat. Palpations: Abdomen is soft. Tenderness: There is no abdominal tenderness. There is no right CVA tenderness, left CVA tenderness, guarding or rebound. Negative signs include Dockery's sign. Hernia: No hernia is present. Genitourinary:     Rectum: No mass, tenderness, anal fissure, external hemorrhoid or internal hemorrhoid. Normal anal tone. Comments: Good sphincter tone , no gross blood on exam  Musculoskeletal:         General: Normal range of motion. Cervical back: Full passive range of motion without pain, normal range of motion and neck supple. No tenderness. Feet:       Comments: Contracture RUE secondary to CVA , ambulatory in ER, limited RLE ROM secondary to hemiparesis    Feet:      Comments: onchomycosis noted to toes bilaterally, otherwise, no overlying rash or lesion noted  Lymphadenopathy:      Cervical: No cervical adenopathy. Skin:     General: Skin is warm and dry. Capillary Refill: Capillary refill takes less than 2 seconds. Findings: No erythema, laceration, lesion or rash. Neurological:      General: No focal deficit present. Mental Status: She is alert and oriented to person, place, and time.    Psychiatric:         Mood and Affect: Mood normal.         Behavior: Behavior normal. Behavior is cooperative. Diagnostic Study Results     Labs -     Recent Results (from the past 12 hour(s))   CBC WITH AUTOMATED DIFF    Collection Time: 12/02/21  1:45 PM   Result Value Ref Range    WBC 6.1 4.6 - 13.2 K/uL    RBC 4.73 4.20 - 5.30 M/uL    HGB 12.0 12.0 - 16.0 g/dL    HCT 38.1 35.0 - 45.0 %    MCV 80.5 78.0 - 100.0 FL    MCH 25.4 24.0 - 34.0 PG    MCHC 31.5 31.0 - 37.0 g/dL    RDW 14.3 11.6 - 14.5 %    PLATELET 314 272 - 308 K/uL    MPV 8.8 (L) 9.2 - 11.8 FL    NRBC 0.0 0  WBC    ABSOLUTE NRBC 0.00 0.00 - 0.01 K/uL    NEUTROPHILS 50 40 - 73 %    LYMPHOCYTES 37 21 - 52 %    MONOCYTES 9 3 - 10 %    EOSINOPHILS 3 0 - 5 %    BASOPHILS 0 0 - 2 %    IMMATURE GRANULOCYTES 0 0.0 - 0.5 %    ABS. NEUTROPHILS 3.0 1.8 - 8.0 K/UL    ABS. LYMPHOCYTES 2.3 0.9 - 3.6 K/UL    ABS. MONOCYTES 0.6 0.05 - 1.2 K/UL    ABS. EOSINOPHILS 0.2 0.0 - 0.4 K/UL    ABS. BASOPHILS 0.0 0.0 - 0.1 K/UL    ABS. IMM. GRANS. 0.0 0.00 - 0.04 K/UL    DF AUTOMATED     METABOLIC PANEL, COMPREHENSIVE    Collection Time: 12/02/21  1:45 PM   Result Value Ref Range    Sodium 137 136 - 145 mmol/L    Potassium 2.7 (LL) 3.5 - 5.5 mmol/L    Chloride 104 100 - 111 mmol/L    CO2 27 21 - 32 mmol/L    Anion gap 6 3.0 - 18 mmol/L    Glucose 132 (H) 74 - 99 mg/dL    BUN 4 (L) 7.0 - 18 MG/DL    Creatinine 0.86 0.6 - 1.3 MG/DL    BUN/Creatinine ratio 5 (L) 12 - 20      GFR est AA >60 >60 ml/min/1.73m2    GFR est non-AA >60 >60 ml/min/1.73m2    Calcium 9.1 8.5 - 10.1 MG/DL    Bilirubin, total 0.5 0.2 - 1.0 MG/DL    ALT (SGPT) 25 13 - 56 U/L    AST (SGOT) 47 (H) 10 - 38 U/L    Alk.  phosphatase 143 (H) 45 - 117 U/L    Protein, total 10.4 (H) 6.4 - 8.2 g/dL    Albumin 3.5 3.4 - 5.0 g/dL    Globulin 6.9 (H) 2.0 - 4.0 g/dL    A-G Ratio 0.5 (L) 0.8 - 1.7     LIPASE    Collection Time: 12/02/21  1:45 PM   Result Value Ref Range    Lipase 274 73 - 393 U/L   MAGNESIUM    Collection Time: 12/02/21  1:45 PM Result Value Ref Range    Magnesium 2.1 1.6 - 2.6 mg/dL       Radiologic Studies -   CT ABD PELV W CONT   Final Result      Small to moderate hiatal hernia. No other CT finding for an acute intra-abdominal or pelvic process. XR FOOT RT MIN 3 V   Final Result      Great toe MTP and IP joint hardware fixation with solid fusion. No obvious acute   osseous findings. Osteopenia limits evaluation of bony detail. CT Results  (Last 48 hours)               12/02/21 1612  CT ABD PELV W CONT Final result    Impression:      Small to moderate hiatal hernia. No other CT finding for an acute intra-abdominal or pelvic process. Narrative:  CT ABDOMEN/PELVIS WITH CONTRAST       HISTORY: Right-sided pain and constipation. COMPARISON: None. TECHNIQUE: 5 mm helical scans were obtained of the abdomen and pelvis after   uneventful administration of intravenous contrast. Oral contrast was also given. Coronal and sagittal reformations. All CT scans are performed using dose optimization techniques as appropriate to   the performed exam including the following: Automated exposure control,   adjustment of mA and/or kV according to patient size, and use of iterative   reconstructive technique. FINDINGS:        Dependent atelectasis. Moderate to large hiatal hernia. No effusions. Low-attenuation liver foci present posterior right liver near the dome image 11   measuring 0.9 cm. Anterior right liver margin image 10 measuring 1.0 cm. Homogeneous enhancement of the spleen. Homogeneous enhancement the pancreas. The gallbladder is without stones or sludge. No adrenal nodules or masses. The kidneys enhance symmetrically. No focal lesion is identified. No   hydronephrosis. There is no free intraperitoneal air, free fluid, or fluid collections. No abdominal or pelvic lymphadenopathy. The small and large bowel are normal in caliber. The appendix does not appear   inflamed. The bladder is under distended and therefore incompletely evaluated. Uterus and   ovaries within normal limits. No adnexal mass. .        No calcifications in the abdominal aorta. No aneurysmal dilatation. Osseous structures are intact. CXR Results  (Last 48 hours)    None            Medical Decision Making   I am the first provider for this patient. I reviewed the vital signs, available nursing notes, past medical history, past surgical history, family history and social history. Vital Signs-Reviewed the patient's vital signs. Records Reviewed: Nursing notes, old medical records and any previous labs, imaging, visits, consultations pertinent to patient care    Procedures:  Procedures      ED Course: Progress Notes, Reevaluation, and Consults:  1:06 PM  Initial assessment performed. The patients presenting problems have been discussed, and they/their family are in agreement with the care plan formulated and outlined with them. I have encouraged them to ask questions as they arise throughout their visit. 2:05 PM right foot x-ray shows great toe MTP and IP joint hardware fixation with solid fusion. No obvious acute osseous findings. Osteopenia. CBC shows no leukocytosis, anemia, shift, or bands. CMP does show a low potassium of 2.1 with normal magnesium. We will replace this both orally and IV and obtain an EKG due to hypokalemia. Slight elevation in AST and alk phos. Lipase is within normal limits. Patient is not diabetic. Pending CT scan.    5:00 PM patient on cardiac monitor. NSR rate 87 bmp. Receiving IVF and IV potassium. Results of CT and foot xray discussed . Hiatal hernia on CT and liver lesion incidentally without other acute findings. No fecal impaction noted on FELICE. No naldo blood or melena. Plan to hydrate and replace K and d/c with GI follow-up for colonoscopy and podiatry to right foot pain.      Tolerating PO well. Patient appears well and comfortable. Doubt acute surgical process. Repeat abdominal exam reveals soft and non-tender abdomen. No new symptoms on re-evaluation. Patient has no pain and with improvement in nausea. I do not feel that any additional emergent imaging is warranted at this time. 7:00 PM : Pt care transferred to Link Bustillo, [facility]- ED provider. History of patient complaint(s), available diagnostic reports and current treatment plan has been discussed thoroughly. Bedside rounding on patient occured : yes . Intended disposition of patient : Discharge}  Pending diagnostics reports and/or labs (please list): IV potassium infusion and UA      Provider Notes (Medical Decision Making):   Patient presents ambulatory in no acute distress-ambulates with a cane due to hemiparesis, well-hydrated, non-toxic in appearance, with normal vitals. Benign exam of abdomen with mild and diffuse right-sided tenderness diffusely and no peritoneal signs. Patient is a well-appearing 49-year-old female with history of CVA and right-sided hemiparesis with right upper extremity contracture walks with a cane. She presents with right-sided abdominal pain gradually worsening over the last 2 weeks with constipation. No improvement with Dulcolax over-the-counter. She has never had a colonoscopy that she remembers. She states occasionally there is bright red blood streaked in her bowel movements but she denies any nausea, vomiting, or fever. She has been having good appetite and oral intake. No weight loss. Also been complaining of gradually worsening right foot plain, plantar aspect for several months. She did receive a referral to podiatry but has not gone yet. She denies any injury, trauma, or fall no redness or swelling. Will obtain appropriate studies to evaluate patient's complaints and treat symptomatically.  Will disposition after reassessment assuming no clinical change or worsening and appropriate response to symptomatic treatment. MED RECONCILIATION:  No current facility-administered medications for this encounter. Current Outpatient Medications   Medication Sig    lisinopriL (PRINIVIL, ZESTRIL) 20 mg tablet Take 1 Tablet by mouth daily.  levETIRAcetam (Keppra) 500 mg tablet Take 1 Tablet by mouth two (2) times a day.  amLODIPine (NORVASC) 10 mg tablet Take 1 Tablet by mouth daily.  clopidogreL (Plavix) 75 mg tab Take 1 Tablet by mouth daily.  aspirin delayed-release 81 mg tablet Take 1 Tablet by mouth daily.  atorvastatin (LIPITOR) 40 mg tablet Take 1 Tablet by mouth daily.  potassium chloride (K-DUR, KLOR-CON) 20 mEq tablet Take 1 Tablet by mouth two (2) times a day. (Patient not taking: Reported on 10/13/2021)    ferrous sulfate 325 mg (65 mg iron) tablet Take 1 Tablet by mouth every other day. Disposition:  Pending, plan for d/c home      Follow-up Information     Follow up With Specialties Details Why Contact Info    [facility]  Schedule an appointment as soon as possible for a visit  Follow-up from the Emergency Department [address] Podiatry Schedule an appointment as soon as possible for a visit  Follow-up from the Emergency Department [address] [place] [address]  [phone]      [facility] EMERGENCY DEPT Emergency Medicine  As needed, If symptoms worsen [address]  [phone]          Current Discharge Medication List                Diagnosis     Clinical Impression:   1. Hypokalemia    2. Hiatal hernia    3. Liver lesion    4. Abdominal pain, right lower quadrant    5. Constipation, unspecified constipation type        Dictation disclaimer:  Please note that this dictation was completed with [employer] voice recognition software.   Quite often unanticipated grammatical, syntax, homophones, and other interpretive errors are inadvertently transcribed by the computer software. Please disregard these errors. Please excuse any errors that have escaped final proofreading.

## 2021-12-03 LAB
ATRIAL RATE: 96 BPM
CALCULATED P AXIS, ECG09: 47 DEGREES
CALCULATED R AXIS, ECG10: 20 DEGREES
CALCULATED T AXIS, ECG11: 37 DEGREES
DIAGNOSIS, 93000: NORMAL
P-R INTERVAL, ECG05: 156 MS
Q-T INTERVAL, ECG07: 364 MS
QRS DURATION, ECG06: 72 MS
QTC CALCULATION (BEZET), ECG08: 459 MS
VENTRICULAR RATE, ECG03: 96 BPM

## 2021-12-03 NOTE — ED NOTES
7:07 PM :Pt care assumed from Genevieve Washington ,NP, ED provider. Pt complaint(s), current treatment plan, progression and available diagnostic results have been discussed thoroughly. Rounding occurred: no  Intended Disposition: Home   Pending diagnostic reports and/or labs (please list): UA, K infusion, d/c    8:44 PM: Pt has had 1.5 K runs completed, PO 40 meq. Notes she wants to leave as she has been in the ED for 8 hours. Has not been able to provide a urine sample. Reviewed results with patient and daughter via cell phone. Discussed need for close outpatient follow-up this week with PMD for repeat BMP, GI for further assessment. Discussed strict return precautions, including fever, vomiting, or any other medical concerns. In agreement with plan, ready to go home.

## 2021-12-08 ENCOUNTER — TELEPHONE (OUTPATIENT)
Dept: FAMILY MEDICINE CLINIC | Age: 56
End: 2021-12-08

## 2021-12-08 NOTE — TELEPHONE ENCOUNTER
TC to patient son Jared Torres. He was informed that patient was referred to gastro in June, he stated he did not recall she was referred to a few specialize. Jared Torres was given Dr. Nicholas Esteban number [phone] and instructed to call and schedule his mom appointment.

## 2021-12-12 DIAGNOSIS — E78.2 MIXED HYPERLIPIDEMIA: ICD-10-CM

## 2021-12-12 DIAGNOSIS — I10 ESSENTIAL HYPERTENSION: ICD-10-CM

## 2021-12-12 DIAGNOSIS — I69.359 CVA, OLD, HEMIPARESIS (HCC): ICD-10-CM

## 2021-12-15 RX ORDER — CLOPIDOGREL BISULFATE 75 MG/1
TABLET ORAL
Qty: 30 TABLET | Refills: 2 | Status: SHIPPED | OUTPATIENT
Start: 2021-12-15 | End: 2022-04-04

## 2021-12-15 RX ORDER — ASPIRIN 81 MG/1
TABLET ORAL
Qty: 30 TABLET | Refills: 2 | Status: SHIPPED | OUTPATIENT
Start: 2021-12-15 | End: 2022-03-18

## 2021-12-15 RX ORDER — AMLODIPINE BESYLATE 10 MG/1
TABLET ORAL
Qty: 30 TABLET | Refills: 2 | Status: SHIPPED | OUTPATIENT
Start: 2021-12-15 | End: 2022-03-18

## 2021-12-15 RX ORDER — ATORVASTATIN CALCIUM 40 MG/1
TABLET, FILM COATED ORAL
Qty: 30 TABLET | Refills: 2 | Status: SHIPPED | OUTPATIENT
Start: 2021-12-15 | End: 2022-03-18

## 2021-12-28 NOTE — TELEPHONE ENCOUNTER
Late entry: Spoke with patient she was instructed to follow-up with GI. Patient verbalized her understanding.

## 2022-01-13 DIAGNOSIS — I10 ESSENTIAL HYPERTENSION: ICD-10-CM

## 2022-01-13 RX ORDER — LISINOPRIL 20 MG/1
TABLET ORAL
Qty: 30 TABLET | Refills: 2 | Status: SHIPPED | OUTPATIENT
Start: 2022-01-13 | End: 2022-02-09 | Stop reason: SDUPTHER

## 2022-01-24 DIAGNOSIS — R56.9 SEIZURE (HCC): ICD-10-CM

## 2022-01-27 RX ORDER — LEVETIRACETAM 500 MG/1
TABLET ORAL
Qty: 60 TABLET | Refills: 2 | Status: SHIPPED | OUTPATIENT
Start: 2022-01-27 | End: 2022-08-10 | Stop reason: SDUPTHER

## 2022-02-09 ENCOUNTER — OFFICE VISIT (OUTPATIENT)
Dept: FAMILY MEDICINE CLINIC | Age: 57
End: 2022-02-09
Payer: COMMERCIAL

## 2022-02-09 VITALS
SYSTOLIC BLOOD PRESSURE: 132 MMHG | TEMPERATURE: 96.8 F | DIASTOLIC BLOOD PRESSURE: 100 MMHG | RESPIRATION RATE: 16 BRPM | OXYGEN SATURATION: 96 % | WEIGHT: 157 LBS | HEART RATE: 128 BPM | BODY MASS INDEX: 25.23 KG/M2 | HEIGHT: 66 IN

## 2022-02-09 DIAGNOSIS — I10 ESSENTIAL HYPERTENSION: ICD-10-CM

## 2022-02-09 PROCEDURE — 99213 OFFICE O/P EST LOW 20 MIN: CPT | Performed by: NURSE PRACTITIONER

## 2022-02-09 RX ORDER — LISINOPRIL 20 MG/1
20 TABLET ORAL DAILY
Qty: 30 TABLET | Refills: 2 | Status: SHIPPED | OUTPATIENT
Start: 2022-02-09 | End: 2022-03-22

## 2022-02-09 RX ORDER — HYDROCHLOROTHIAZIDE 12.5 MG/1
12.5 TABLET ORAL DAILY
Qty: 90 TABLET | Refills: 1 | Status: SHIPPED | OUTPATIENT
Start: 2022-02-09 | End: 2022-03-22

## 2022-02-09 NOTE — PROGRESS NOTES
Ninajuan Nusrat presents today for   Chief Complaint   Patient presents with    Hypertension     follow-up    Foot Pain       Is someone accompanying this pt? yes    Is the patient using any DME equipment during OV? cane    Depression Screening:  3 most recent PHQ Screens 2/9/2022   Little interest or pleasure in doing things Not at all   Feeling down, depressed, irritable, or hopeless Not at all   Total Score PHQ 2 0       Learning Assessment:  Learning Assessment 6/28/2021   PRIMARY LEARNER Patient   HIGHEST LEVEL OF EDUCATION - PRIMARY LEARNER  GRADUATED HIGH SCHOOL OR GED   BARRIERS PRIMARY LEARNER NONE   PRIMARY LANGUAGE ENGLISH   LEARNER PREFERENCE PRIMARY READING   ANSWERED BY patient   RELATIONSHIP SELF       Health Maintenance reviewed and discussed and ordered per Provider. Health Maintenance Due   Topic Date Due    Hepatitis C Screening  Never done    DTaP/Tdap/Td series (1 - Tdap) Never done    Cervical cancer screen  Never done    Shingrix Vaccine Age 50> (1 of 2) Never done    Breast Cancer Screen Mammogram  Never done   . Coordination of Care:  1. Have you been to the ER, urgent care clinic since your last visit? Hospitalized since your last visit? yes    2. Have you seen or consulted any other health care providers outside of the [address] [place] [address] since your last visit? Include any pap smears or colon screening.  no

## 2022-02-24 NOTE — PROGRESS NOTES
Maxwell Fermin is a 60 y.o. female presenting today for Hypertension (follow-up)  . Chief Complaint   Patient presents with    Hypertension     follow-up       HPI:  Maxwell Fermin presents to the office today for hypertension follow-up care. Patient BP is elevated today 132/100. Per the patient she is compliant with the treatment plan and currently is prescribed lisinopril hydrochlorothiazide. She is negative chest pain, palpitation or dizziness. Review of Systems   Constitutional: Negative for malaise/fatigue. Respiratory: Negative for cough and shortness of breath. Cardiovascular: Negative for chest pain, palpitations and leg swelling. Gastrointestinal: Negative for abdominal pain, nausea and vomiting. Genitourinary: Negative for dysuria. Musculoskeletal: Negative for back pain and myalgias. Neurological: Negative for dizziness and headaches. Allergies   Allergen Reactions    Aggrenox [Aspirin-Dipyridamole] Swelling       PHQ Screening   3 most recent PHQ Screens 2/9/2022   Little interest or pleasure in doing things Not at all   Feeling down, depressed, irritable, or hopeless Not at all   Total Score PHQ 2 0       History  Past Medical History:   Diagnosis Date    [facility]     HLD (hyperlipidemia)     HTN (hypertension)     Right hemiparesis ([facility]     Seizure ([facility]        History reviewed. No pertinent surgical history.     Social History     Socioeconomic History    Marital status: SINGLE     Spouse name: Not on file    Number of children: Not on file    Years of education: Not on file    Highest education level: Not on file   Occupational History    Not on file   Tobacco Use    Smoking status: Never Smoker    Smokeless tobacco: Never Used   Substance and Sexual Activity    Alcohol use: Never    Drug use: Never    Sexual activity: Not Currently   Other Topics Concern    Not on file   Social History Narrative    Not on file Social Determinants of Health     Financial Resource Strain:     Difficulty of Paying Living Expenses: Not on file   Food Insecurity:     Worried About Running Out of Food in the Last Year: Not on file    Butch of Food in the Last Year: Not on file   Transportation Needs:     Lack of Transportation (Medical): Not on file    Lack of Transportation (Non-Medical): Not on file   Physical Activity:     Days of Exercise per Week: Not on file    Minutes of Exercise per Session: Not on file   Stress:     Feeling of Stress : Not on file   Social Connections:     Frequency of Communication with Friends and Family: Not on file    Frequency of Social Gatherings with Friends and Family: Not on file    Attends [employer] Services: Not on file    Active Member of [address] [place] [address] or Organizations: Not on file    Attends Club or Organization Meetings: Not on file    Marital Status: Not on file   Intimate Partner Violence:     Fear of Current or Ex-Partner: Not on file    Emotionally Abused: Not on file    Physically Abused: Not on file    Sexually Abused: Not on file   Housing Stability:     Unable to Pay for Housing in the Last Year: Not on file    Number of [place] in the Last Year: Not on file    Unstable Housing in the Last Year: Not on file       Current Outpatient Medications   Medication Sig Dispense Refill    lisinopriL (PRINIVIL, ZESTRIL) 20 mg tablet Take 1 Tablet by mouth daily. 30 Tablet 2    hydroCHLOROthiazide (HYDRODIURIL) 12.5 mg tablet Take 1 Tablet by mouth daily. 90 Tablet 1    levETIRAcetam (KEPPRA) 500 mg tablet TAKE 1 TABLET BY MOUTH TWO TIMES A DAY.  60 Tablet 2    aspirin delayed-release 81 mg tablet TAKE 1 TABLET BY MOUTH EVERY DAY 30 Tablet 2    clopidogreL (PLAVIX) 75 mg tab TAKE 1 TABLET BY MOUTH EVERY DAY 30 Tablet 2    amLODIPine (NORVASC) 10 mg tablet TAKE 1 TABLET BY MOUTH EVERY DAY 30 Tablet 2    atorvastatin (LIPITOR) 40 mg tablet TAKE 1 TABLET BY MOUTH EVERY DAY 30 Tablet 2    ferrous sulfate 325 mg (65 mg iron) tablet Take 1 Tablet by mouth every other day. 30 Tablet 0         Vitals:    02/09/22 1005 02/09/22 1042   BP: (!) 142/100 (!) 132/100   Pulse: (!) 128    Resp: 16    Temp: 96.8 °F (36 °C)    TempSrc: Oral    SpO2: 96%    Weight: 157 lb (71.2 kg)    Height: 5' 6\" (1.676 m)    PainSc:   0 - No pain        Physical Exam  Vitals and nursing note reviewed. Constitutional:       Appearance: Normal appearance. Cardiovascular:      Rate and Rhythm: Normal rate and regular rhythm. Pulses: Normal pulses. Heart sounds: Normal heart sounds. Pulmonary:      Effort: Pulmonary effort is normal.      Breath sounds: Normal breath sounds. Abdominal:      General: Bowel sounds are normal.      Palpations: Abdomen is soft. Musculoskeletal:      Cervical back: Normal range of motion and neck supple. Skin:     General: Skin is warm and dry. Neurological:      General: No focal deficit present. Mental Status: She is alert. Admission on 12/02/2021, Discharged on 12/03/2021   Component Date Value Ref Range Status    WBC 12/02/2021 6.1  4.6 - 13.2 K/uL Final    RBC 12/02/2021 4.73  4.20 - 5.30 M/uL Final    HGB 12/02/2021 12.0  12.0 - 16.0 g/dL Final    HCT 12/02/2021 38.1  35.0 - 45.0 % Final    MCV 12/02/2021 80.5  78.0 - 100.0 FL Final    MCH 12/02/2021 25.4  24.0 - 34.0 PG Final    MCHC 12/02/2021 31.5  31.0 - 37.0 g/dL Final    RDW 12/02/2021 14.3  11.6 - 14.5 % Final    PLATELET 88/95/4164 575  135 - 420 K/uL Final    MPV 12/02/2021 8.8* 9.2 - 11.8 FL Final    NRBC 12/02/2021 0.0  0  WBC Final    ABSOLUTE NRBC 12/02/2021 0.00  0.00 - 0.01 K/uL Final    NEUTROPHILS 12/02/2021 50  40 - 73 % Final    LYMPHOCYTES 12/02/2021 37  21 - 52 % Final    MONOCYTES 12/02/2021 9  3 - 10 % Final    EOSINOPHILS 12/02/2021 3  0 - 5 % Final    BASOPHILS 12/02/2021 0  0 - 2 % Final    IMMATURE GRANULOCYTES 12/02/2021 0  0.0 - 0.5 % Final    ABS. NEUTROPHILS 12/02/2021 3.0  1.8 - 8.0 K/UL Final    ABS. LYMPHOCYTES 12/02/2021 2.3  0.9 - 3.6 K/UL Final    ABS. MONOCYTES 12/02/2021 0.6  0.05 - 1.2 K/UL Final    ABS. EOSINOPHILS 12/02/2021 0.2  0.0 - 0.4 K/UL Final    ABS. BASOPHILS 12/02/2021 0.0  0.0 - 0.1 K/UL Final    ABS. IMM. GRANS. 12/02/2021 0.0  0.00 - 0.04 K/UL Final    DF 12/02/2021 AUTOMATED    Final    Sodium 12/02/2021 137  136 - 145 mmol/L Final    Potassium 12/02/2021 2.7* 3.5 - 5.5 mmol/L Final    Comment: CALLED TO AND CORRECTLY REPEATED BY:  ROSARIO PINEDO RN ON 12/2/21 @1427 TO [facility]      Chloride 12/02/2021 104  100 - 111 mmol/L Final    CO2 12/02/2021 27  21 - 32 mmol/L Final    Anion gap 12/02/2021 6  3.0 - 18 mmol/L Final    Glucose 12/02/2021 132* 74 - 99 mg/dL Final    BUN 12/02/2021 4* 7.0 - 18 MG/DL Final    Creatinine 12/02/2021 0.86  0.6 - 1.3 MG/DL Final    BUN/Creatinine ratio 12/02/2021 5* 12 - 20   Final    GFR est AA 12/02/2021 >60  >60 ml/min/1.73m2 Final    GFR est non-AA 12/02/2021 >60  >60 ml/min/1.73m2 Final    Comment: (NOTE)  Estimated GFR is calculated using the Modification of Diet in Renal   Disease (MDRD) Study equation, reported for both  Americans   (GFRAA) and non- Americans (GFRNA), and normalized to 1.73m2   body surface area. The physician must decide which value applies to   the patient. The MDRD study equation should only be used in   individuals age 25 or older. It has not been validated for the   following: pregnant women, patients with serious comorbid conditions,   or on certain medications, or persons with extremes of body size,   muscle mass, or nutritional status.  Calcium 12/02/2021 9.1  8.5 - 10.1 MG/DL Final    Bilirubin, total 12/02/2021 0.5  0.2 - 1.0 MG/DL Final    ALT (SGPT) 12/02/2021 25  13 - 56 U/L Final    AST (SGOT) 12/02/2021 47* 10 - 38 U/L Final    Alk.  phosphatase 12/02/2021 143* 45 - 117 U/L Final    Protein, total 12/02/2021 10.4* 6.4 - 8.2 g/dL Final    Albumin 12/02/2021 3.5  3.4 - 5.0 g/dL Final    Globulin 12/02/2021 6.9* 2.0 - 4.0 g/dL Final    A-G Ratio 12/02/2021 0.5* 0.8 - 1.7   Final    Lipase 12/02/2021 274  73 - 393 U/L Final    Occult blood, stool 12/02/2021 Positive* NEG   Final    Magnesium 12/02/2021 2.1  1.6 - 2.6 mg/dL Final    Ventricular Rate 12/02/2021 96  BPM Final    Atrial Rate 12/02/2021 96  BPM Final    P-R Interval 12/02/2021 156  ms Final    QRS Duration 12/02/2021 72  ms Final    Q-T Interval 12/02/2021 364  ms Final    QTC Calculation (Bezet) 12/02/2021 459  ms Final    Calculated P Axis 12/02/2021 47  degrees Final    Calculated R Axis 12/02/2021 20  degrees Final    Calculated T Axis 12/02/2021 37  degrees Final    Diagnosis 12/02/2021    Final                    Value:Normal sinus rhythm  Nonspecific T wave abnormality  Abnormal ECG  When compared with ECG of 30-MAY-2021 17:04,  No significant change was found  Confirmed by Meenakshi Flores ([id number]) on 12/3/2021 10:39:43 AM         No results found for any visits on 02/09/22. Patient Care Team:  Patient Care Team:  Sultana Mcfarland NP as PCP - General (Nurse Practitioner)  Sultana Mcfarland NP as PCP - Yosvany Alvarenga Provider      Assessment / Plan:      ICD-10-CM ICD-9-CM    1. Essential hypertension  I10 401.9 lisinopriL (PRINIVIL, ZESTRIL) 20 mg tablet      hydroCHLOROthiazide (HYDRODIURIL) 12.5 mg tablet     Medication refills  Follow-up 4 to 6 weeks for reevaluation of BP    Follow-up and Dispositions    · Return in about 3 months (around 5/9/2022). I asked the patient if she  had any questions and answered her  questions. The patient stated that she understands the treatment plan and agrees with the treatment plan    This document was created with a voice activated dictation system and may contain transcription errors.

## 2022-03-18 DIAGNOSIS — I10 ESSENTIAL HYPERTENSION: ICD-10-CM

## 2022-03-18 DIAGNOSIS — I69.359 CVA, OLD, HEMIPARESIS (HCC): ICD-10-CM

## 2022-03-18 DIAGNOSIS — E78.2 MIXED HYPERLIPIDEMIA: ICD-10-CM

## 2022-03-18 PROBLEM — R47.01 EXPRESSIVE APHASIA: Status: ACTIVE | Noted: 2021-05-30

## 2022-03-18 RX ORDER — AMLODIPINE BESYLATE 10 MG/1
TABLET ORAL
Qty: 30 TABLET | Refills: 2 | Status: SHIPPED | OUTPATIENT
Start: 2022-03-18 | End: 2022-03-22

## 2022-03-18 RX ORDER — ATORVASTATIN CALCIUM 40 MG/1
TABLET, FILM COATED ORAL
Qty: 30 TABLET | Refills: 2 | Status: SHIPPED | OUTPATIENT
Start: 2022-03-18 | End: 2022-04-06

## 2022-03-18 RX ORDER — ASPIRIN 81 MG/1
TABLET ORAL
Qty: 30 TABLET | Refills: 2 | Status: SHIPPED | OUTPATIENT
Start: 2022-03-18 | End: 2022-06-24 | Stop reason: SDUPTHER

## 2022-03-19 PROBLEM — R07.9 CHEST PAIN: Status: ACTIVE | Noted: 2022-03-19

## 2022-03-21 PROBLEM — R07.89 ATYPICAL CHEST PAIN: Status: ACTIVE | Noted: 2022-03-21

## 2022-03-21 PROBLEM — I10 HYPERTENSION: Status: ACTIVE | Noted: 2022-03-21

## 2022-03-24 PROBLEM — I10 HYPERTENSION: Status: ACTIVE | Noted: 2022-03-21

## 2022-03-24 PROBLEM — R07.89 ATYPICAL CHEST PAIN: Status: ACTIVE | Noted: 2022-03-21

## 2022-03-24 PROBLEM — R07.9 CHEST PAIN: Status: ACTIVE | Noted: 2022-03-19

## 2022-04-02 DIAGNOSIS — I69.359 CVA, OLD, HEMIPARESIS (HCC): ICD-10-CM

## 2022-04-04 DIAGNOSIS — E78.2 MIXED HYPERLIPIDEMIA: ICD-10-CM

## 2022-04-04 DIAGNOSIS — I10 ESSENTIAL HYPERTENSION: ICD-10-CM

## 2022-04-04 RX ORDER — CLOPIDOGREL BISULFATE 75 MG/1
TABLET ORAL
Qty: 30 TABLET | Refills: 2 | Status: SHIPPED | OUTPATIENT
Start: 2022-04-04 | End: 2022-06-23 | Stop reason: SDUPTHER

## 2022-04-06 RX ORDER — ATORVASTATIN CALCIUM 40 MG/1
TABLET, FILM COATED ORAL
Qty: 30 TABLET | Refills: 2 | Status: SHIPPED | OUTPATIENT
Start: 2022-04-06 | End: 2022-08-10 | Stop reason: SDUPTHER

## 2022-04-06 RX ORDER — AMLODIPINE BESYLATE 10 MG/1
TABLET ORAL
Qty: 30 TABLET | Refills: 2 | Status: SHIPPED | OUTPATIENT
Start: 2022-04-06 | End: 2022-06-23 | Stop reason: SDUPTHER

## 2022-04-06 RX ORDER — LISINOPRIL 20 MG/1
TABLET ORAL
Qty: 30 TABLET | Refills: 2 | Status: SHIPPED | OUTPATIENT
Start: 2022-04-06 | End: 2022-05-03

## 2022-04-13 ENCOUNTER — OFFICE VISIT (OUTPATIENT)
Dept: FAMILY MEDICINE CLINIC | Age: 57
End: 2022-04-13
Payer: COMMERCIAL

## 2022-04-13 VITALS
WEIGHT: 164 LBS | OXYGEN SATURATION: 98 % | HEART RATE: 121 BPM | TEMPERATURE: 98.2 F | RESPIRATION RATE: 16 BRPM | DIASTOLIC BLOOD PRESSURE: 86 MMHG | HEIGHT: 66 IN | BODY MASS INDEX: 26.36 KG/M2 | SYSTOLIC BLOOD PRESSURE: 120 MMHG

## 2022-04-13 DIAGNOSIS — I10 HYPERTENSION, UNSPECIFIED TYPE: Primary | ICD-10-CM

## 2022-04-13 PROCEDURE — 99213 OFFICE O/P EST LOW 20 MIN: CPT | Performed by: NURSE PRACTITIONER

## 2022-04-13 NOTE — PROGRESS NOTES
Dayna Florez presents today for   Chief Complaint   Patient presents with    Hypertension     2 month follow-up       Is someone accompanying this pt? no    Is the patient using any DME equipment during OV? no    Depression Screening:  3 most recent PHQ Screens 4/13/2022   Little interest or pleasure in doing things Not at all   Feeling down, depressed, irritable, or hopeless Not at all   Total Score PHQ 2 0       Learning Assessment:  Learning Assessment 6/28/2021   PRIMARY LEARNER Patient   HIGHEST LEVEL OF EDUCATION - PRIMARY LEARNER  GRADUATED HIGH SCHOOL OR GED   BARRIERS PRIMARY LEARNER NONE   PRIMARY LANGUAGE ENGLISH   LEARNER PREFERENCE PRIMARY READING   ANSWERED BY patient   RELATIONSHIP SELF       Health Maintenance reviewed and discussed and ordered per Provider. Health Maintenance Due   Topic Date Due    Hepatitis C Screening  Never done    DTaP/Tdap/Td series (1 - Tdap) Never done    Cervical cancer screen  Never done    Shingrix Vaccine Age 50> (1 of 2) Never done    Breast Cancer Screen Mammogram  Never done    COVID-19 Vaccine (3 - Booster for Pfizer series) 04/12/2022   . Coordination of Care:  1. Have you been to the ER, urgent care clinic since your last visit? Hospitalized since your last visit? no    2. Have you seen or consulted any other health care providers outside of the [address] [place] [address] since your last visit? Include any pap smears or colon screening.  no

## 2022-04-13 NOTE — PROGRESS NOTES
Ila Calhoun is a 60 y.o. female presenting today for Hypertension (2 month follow-up)  . Chief Complaint   Patient presents with    Hypertension     2 month follow-up       HPI:  Ila Calhoun presents to the office today for hypertension follow-up care. Patient was seen in the practice 2 months ago and asked to follow-up today for reevaluation. She feels well and denies any chest pain, palpitation, headache or dizziness. She is compliant as she takes her medication daily. Her BP today is 120/86. She is prescribed amlodipine and lisinopril. She denies any side effects or adverse reaction to the treatment plan. Review of Systems   Constitutional: Negative for malaise/fatigue. Respiratory: Negative for cough and shortness of breath. Cardiovascular: Negative for chest pain, palpitations and leg swelling. Gastrointestinal: Negative for abdominal pain, nausea and vomiting. Genitourinary: Negative for dysuria. Musculoskeletal: Negative for back pain and myalgias. Neurological: Negative for dizziness and headaches. Allergies   Allergen Reactions    Aggrenox [Aspirin-Dipyridamole] Swelling       PHQ Screening   3 most recent PHQ Screens 4/13/2022   Little interest or pleasure in doing things Not at all   Feeling down, depressed, irritable, or hopeless Not at all   Total Score PHQ 2 0       History  Past Medical History:   Diagnosis Date    [facility]A [facility]cerebral [facility] accident) ([facility] [address]     HLD (hyperlipidemia)     HTN (hypertension)     Right hemiparesis ([facility] [address]     Seizure ([facility] [address]        History reviewed. No pertinent surgical history.     Social History     Socioeconomic History    Marital status: SINGLE     Spouse name: Not on file    Number of children: Not on file    Years of education: Not on file    Highest education level: Not on file   Occupational History    Not on file   Tobacco Use    Smoking status: Never Smoker    Smokeless tobacco: Never Used   Substance and Sexual Activity    Alcohol use: Never    Drug use: Never    Sexual activity: Not Currently   Other Topics Concern    Not on file   Social History Narrative    Not on file     Social Determinants of Health     Financial Resource Strain:     Difficulty of Paying Living Expenses: Not on file   Food Insecurity:     Worried About Running Out of Food in the Last Year: Not on file    Butch of Food in the Last Year: Not on file   Transportation Needs:     Lack of Transportation (Medical): Not on file    Lack of Transportation (Non-Medical): Not on file   Physical Activity:     Days of Exercise per Week: Not on file    Minutes of Exercise per Session: Not on file   Stress:     Feeling of Stress : Not on file   Social Connections:     Frequency of Communication with Friends and Family: Not on file    Frequency of Social Gatherings with Friends and Family: Not on file    Attends [employer] Services: Not on file    Active Member of Clubs or Organizations: Not on file    Attends Club or Organization Meetings: Not on file    Marital Status: Not on file   Intimate Partner Violence:     Fear of Current or Ex-Partner: Not on file    Emotionally Abused: Not on file    Physically Abused: Not on file    Sexually Abused: Not on file   Housing Stability:     Unable to Pay for Housing in the Last Year: Not on file    Number of Places Lived in the Last Year: Not on file    Unstable Housing in the Last Year: Not on file       Current Outpatient Medications   Medication Sig Dispense Refill    amLODIPine (NORVASC) 10 mg tablet TAKE 1 TABLET BY MOUTH EVERY DAY 30 Tablet 2    lisinopriL (PRINIVIL, ZESTRIL) 20 mg tablet TAKE 1 TABLET BY MOUTH EVERY DAY 30 Tablet 2    atorvastatin (LIPITOR) 40 mg tablet TAKE 1 TABLET BY MOUTH EVERY DAY 30 Tablet 2    clopidogreL (PLAVIX) 75 mg tab TAKE 1 TABLET BY MOUTH EVERY DAY 30 Tablet 2    esomeprazole (NexIUM) 20 mg capsule Take 2 Capsules by mouth two (2) times a day.  60 Capsule 1  aspirin delayed-release 81 mg tablet TAKE 1 TABLET BY MOUTH EVERY DAY 30 Tablet 2    polyethylene glycol (Miralax) 17 gram/dose powder Take 17 g by mouth daily. 1 tablespoon with 8 oz of water daily 210 g 0    levETIRAcetam (KEPPRA) 500 mg tablet TAKE 1 TABLET BY MOUTH TWO TIMES A DAY. 60 Tablet 2         Vitals:    04/13/22 0932 04/13/22 1001   BP: (!) 116/94 120/86   Pulse: (!) 121    Resp: 16    Temp: 98.2 °F (36.8 °C)    TempSrc: Oral    SpO2: 98%    Weight: 164 lb (74.4 kg)    Height: 5' 6\" (1.676 m)    PainSc:   0 - No pain        Physical Exam  Vitals and nursing note reviewed. Cardiovascular:      Rate and Rhythm: Normal rate and regular rhythm. Pulses: Normal pulses. Heart sounds: Normal heart sounds. Pulmonary:      Effort: Pulmonary effort is normal.      Breath sounds: Normal breath sounds. Musculoskeletal:      Cervical back: Neck supple. Skin:     General: Skin is warm and dry. Neurological:      Mental Status: She is alert.          Admission on 03/19/2022, Discharged on 03/22/2022   Component Date Value Ref Range Status    Ventricular Rate 03/19/2022 106  BPM Final    Atrial Rate 03/19/2022 106  BPM Final    P-R Interval 03/19/2022 150  ms Final    QRS Duration 03/19/2022 68  ms Final    Q-T Interval 03/19/2022 350  ms Final    QTC Calculation (Bezet) 03/19/2022 464  ms Final    Calculated P Axis 03/19/2022 49  degrees Final    Calculated R Axis 03/19/2022 17  degrees Final    Calculated T Axis 03/19/2022 28  degrees Final    Diagnosis 03/19/2022    Final                    Value:Sinus tachycardia  Possible Left atrial enlargement  Nonspecific T wave abnormality  Abnormal ECG  When compared with ECG of 18-MAR-2022 16:01,  No significant change was found  Confirmed by David Contreras M.D., Ammon Melody ([id number]) on 3/20/2022 3:52:34 PM      WBC 03/19/2022 6.1  4.0 - 11.0 1000/mm3 Final    RBC 03/19/2022 4.36  3.60 - 5.20 M/uL Final    HGB 03/19/2022 11.0* 13.0 - 17.2 gm/dl Final    HCT 03/19/2022 34.8* 37.0 - 50.0 % Final    MCV 03/19/2022 79.8* 80.0 - 98.0 fL Final    MCH 03/19/2022 25.2* 25.4 - 34.6 pg Final    MCHC 03/19/2022 31.6  30.0 - 36.0 gm/dl Final    PLATELET 04/37/0829 044  140 - 450 1000/mm3 Final    MPV 03/19/2022 9.1  6.0 - 10.0 fL Final    RDW-SD 03/19/2022 47.8* 36.4 - 46.3   Final    NRBC 03/19/2022 0  0 - 0   Final    IMMATURE GRANULOCYTES 03/19/2022 0.2  0.0 - 3.0 % Final    Comment: IG - Immature granulocytes (promyelocytes + myelocytes + metamyelocytes), their presence  indicates a left shift. An IG > 3% may predict positive blood cultures with 98% specificity.  (P<0.04) and 92% Positive Predictive Value (Cady)1. Increased immature granulocytes  assist with the detection of infection and/or inflammation and may be present at an early stage  and are more sensitive and specific than band counts.  NEUTROPHILS 03/19/2022 51.2  34 - 64 % Final    LYMPHOCYTES 03/19/2022 38.1  28 - 48 % Final    MONOCYTES 03/19/2022 8.6  1 - 13 % Final    EOSINOPHILS 03/19/2022 1.6  0 - 5 % Final    BASOPHILS 03/19/2022 0.3  0 - 3 % Final    Troponin-High Sensitivity 03/19/2022 3  0 - 59 ng/L Final    Comment: Up to 50% of normal patients may have low levels of detectable troponin (within reference range)  circulating in the blood. Mild elevations in troponin that are above the reference range may  present with other cardiac and non-cardiac disease states. Two or three serial tests at two hour  intervals are recommended to evaluate changes in circulating troponin over time. Note: The reference range is based on the 99th percentile of combined male and female  population.       Sodium 03/19/2022 135* 136 - 145 mEq/L Final    Potassium 03/19/2022 3.6  3.5 - 5.1 mEq/L Final    Chloride 03/19/2022 104  98 - 107 mEq/L Final    CO2 03/19/2022 25  21 - 32 mEq/L Final    Glucose 03/19/2022 89  74 - 106 mg/dl Final    BUN 03/19/2022 9  7 - 25 mg/dl Final    Creatinine 03/19/2022 1.1  0.6 - 1.3 mg/dl Final    GFR est AA 03/19/2022 >60.0    Final    Comment: THE [employer] STATES THAT THE MDRD STUDY EQUATION SHOULD ONLY BE USED ON  INDIVIDUALS 18 OR OLDER. THE REPORT ALSO NOTES THAT THE MDRD STUDY EQUATION HAS NOT BEEN  VALIDATED FOR USE WITH THE ELDERLY (OVER 79YEARS OF AGE), PREGNANT WOMEN, PATIENTS WITH SERIOUS  COMORBID CONDITIONS, OR PERSONS WITH EXTREMES OF BODY SIZE, MUSCLE MASS, OR NUTRITIONAL STATUS. APPLICATION OF THE EQUATION TO THESE PATIENT GROUPS MAY LEAD TO ERRORS IN GFR ESTIMATION. GFR  ESTIMATING EQUATIONS HAVE POORER AGREEMENT WITH MEASURED GFR FOR ILL HOSPITALIZED PATIENTS AND  FOR PEOPLE WITH NEAR NORMAL KIDNEY FUNCTION THAN FOR SUBJECTS IN THE MDRD STUDY. VALIDATION  STUDIES ARE IN PROGRESS TO EVALUATE THE MDRD STUDY EQUATION FOR ADDITIONAL ETHNIC GROUPS, THE  ELDERLY, VARIOUS DISEASE CONDITIONS, AND PEOPLE WITH NORMAL KIDNEY FUNCTION. GFRA----REFERS TO   GFRO---REFERS TO OTHER RACES    REFERENCES AVAILABLE UPON REQUEST.        GFR est non-AA 03/19/2022 54    Final    Calcium 03/19/2022 9.0  8.5 - 10.1 mg/dl Final    AST (SGOT) 03/19/2022 29  15 - 37 U/L Final    ALT (SGPT) 03/19/2022 22  12 - 78 U/L Final    Alk.  phosphatase 03/19/2022 107  45 - 117 U/L Final    Bilirubin, total 03/19/2022 0.5  0.2 - 1.0 mg/dl Final    Protein, total 03/19/2022 10.3* 6.4 - 8.2 gm/dl Final    Albumin 03/19/2022 3.6  3.4 - 5.0 gm/dl Final    Anion gap 03/19/2022 6  5 - 15 mmol/L Final    Magnesium 03/20/2022 2.5  1.6 - 2.6 mg/dl Final    Color 03/20/2022 YELLOW  YELLOW,STRAW   Final    Appearance 03/20/2022 CLEAR  CLEAR   Final    Glucose 03/20/2022 NEGATIVE  NEGATIVE,Negative mg/dl Final    Bilirubin 03/20/2022 NEGATIVE  NEGATIVE,Negative   Final    Ketone 03/20/2022 NEGATIVE  NEGATIVE,Negative mg/dl Final    Specific gravity 03/20/2022 1.025  1.005 - 1.030   Final    Blood 03/20/2022 NEGATIVE  NEGATIVE,Negative   Final  pH (UA) 03/20/2022 5.0  5.0 - 9.0   Final    Protein 03/20/2022 NEGATIVE  NEGATIVE,Negative mg/dl Final    Urobilinogen 03/20/2022 0.2  0.0 - 1.0 mg/dl Final    Nitrites 03/20/2022 NEGATIVE  NEGATIVE,Negative   Final    Leukocyte Esterase 03/20/2022 TRACE* NEGATIVE,Negative   Final    Amphetamine 03/20/2022 NEGATIVE  NEGATIVE   Final    Comment: DISCLAIMER: THIS DRUG SCREENING PROCEDURE IS ONLY FOR MEDICAL PURPOSES AND NOT FOR CHAIN OF  CUSTODY. THRESHOLD (CUTOFF): 1000 ng/mL      Barbiturates 03/20/2022 NEGATIVE  NEGATIVE   Final    Comment: DISCLAIMER: THIS DRUG SCREENING PROCEDURE IS ONLY FOR MEDICAL PURPOSES AND NOT FOR CHAIN OF  CUSTODY. THRESHOLD (CUTOFF): 200 ng/mL      Benzodiazepines 03/20/2022 NEGATIVE  NEGATIVE   Final    Comment: DISCLAIMER: THIS DRUG SCREENING PROCEDURE IS ONLY FOR MEDICAL PURPOSES AND NOT FOR CHAIN OF  CUSTODY. THRESHOLD (CUTOFF): 200 ng/mL      Cocaine 03/20/2022 NEGATIVE  NEGATIVE   Final    Comment: DISCLAIMER: THIS DRUG SCREENING PROCEDURE IS ONLY FOR MEDICAL PURPOSES AND NOT FOR CHAIN OF  CUSTODY. THRESHOLD (CUTOFF): 300 ng/mL      Marijuana 03/20/2022 NEGATIVE  NEGATIVE   Final    Comment: DISCLAIMER:  THIS DRUG SCREENING PROCEDURE IS ONLY FOR MEDICAL PURPOSES AND NOT FOR CHAIN OF  CUSTODY. THRESHOLD (CUTOFF): 50 ng/mL      Methadone 03/20/2022 NEGATIVE  NEGATIVE   Final    Comment: DISCLAIMER: THIS DRUG SCREENING PROCEDURE IS ONLY FOR MEDICAL PURPOSES AND NOT FOR CHAIN OF  CUSTODY. THRESHOLD (CUTOFF): 300 ng/mL      Opiates 03/20/2022 POSITIVE* NEGATIVE   Final    Comment: DISCLAIMER: THIS DRUG SCREENING PROCEDURE IS ONLY FOR MEDICAL PURPOSES AND NOT FOR CHAIN OF  CUSTODY. THRESHOLD (CUTOFF): 300 ng/mL      Phencyclidine 03/20/2022 NEGATIVE  NEGATIVE   Final    Comment: DISCLAIMER: THIS DRUG SCREENING PROCEDURE IS ONLY FOR MEDICAL PURPOSES AND NOT FOR CHAIN OF  CUSTODY.   THRESHOLD (CUTOFF): 25 ng/mL      Sodium 03/20/2022 135* 136 - 145 mEq/L Final    Potassium 03/20/2022 4.0  3.5 - 5.1 mEq/L Final    Chloride 03/20/2022 104  98 - 107 mEq/L Final    CO2 03/20/2022 25  21 - 32 mEq/L Final    Glucose 03/20/2022 90  74 - 106 mg/dl Final    BUN 03/20/2022 12  7 - 25 mg/dl Final    Creatinine 03/20/2022 1.0  0.6 - 1.3 mg/dl Final    GFR est AA 03/20/2022 >60.0    Final    Comment: THE [employer] STATES THAT THE MDRD STUDY EQUATION SHOULD ONLY BE USED ON  INDIVIDUALS 18 OR OLDER. THE REPORT ALSO NOTES THAT THE MDRD STUDY EQUATION HAS NOT BEEN  VALIDATED FOR USE WITH THE ELDERLY (OVER 79YEARS OF AGE), PREGNANT WOMEN, PATIENTS WITH SERIOUS  COMORBID CONDITIONS, OR PERSONS WITH EXTREMES OF BODY SIZE, MUSCLE MASS, OR NUTRITIONAL STATUS. APPLICATION OF THE EQUATION TO THESE PATIENT GROUPS MAY LEAD TO ERRORS IN GFR ESTIMATION. GFR  ESTIMATING EQUATIONS HAVE POORER AGREEMENT WITH MEASURED GFR FOR ILL HOSPITALIZED PATIENTS AND  FOR PEOPLE WITH NEAR NORMAL KIDNEY FUNCTION THAN FOR SUBJECTS IN THE MDRD STUDY. VALIDATION  STUDIES ARE IN PROGRESS TO EVALUATE THE MDRD STUDY EQUATION FOR ADDITIONAL ETHNIC GROUPS, THE  ELDERLY, VARIOUS DISEASE CONDITIONS, AND PEOPLE WITH NORMAL KIDNEY FUNCTION. GFRA----REFERS TO   GFRO---REFERS TO OTHER RACES    REFERENCES AVAILABLE UPON REQUEST.        GFR est non-AA 03/20/2022 >60    Final    Calcium 03/20/2022 8.7  8.5 - 10.1 mg/dl Final    AST (SGOT) 03/20/2022 28  15 - 37 U/L Final    ALT (SGPT) 03/20/2022 21  12 - 78 U/L Final    Alk.  phosphatase 03/20/2022 94  45 - 117 U/L Final    Bilirubin, total 03/20/2022 1.5* 0.2 - 1.0 mg/dl Final    Protein, total 03/20/2022 9.4* 6.4 - 8.2 gm/dl Final    Albumin 03/20/2022 3.1* 3.4 - 5.0 gm/dl Final    Anion gap 03/20/2022 7  5 - 15 mmol/L Final    Magnesium 03/20/2022 2.4  1.6 - 2.6 mg/dl Final    WBC 03/20/2022 6.6  4.0 - 11.0 1000/mm3 Final    RBC 03/20/2022 3.88  3.60 - 5.20 M/uL Final    HGB 03/20/2022 9.7* 13.0 - 17.2 gm/dl Final    HCT 03/20/2022 31.0* 37.0 - 50.0 % Final    MCV 03/20/2022 79.9* 80.0 - 98.0 fL Final    MCH 03/20/2022 25.0* 25.4 - 34.6 pg Final    MCHC 03/20/2022 31.3  30.0 - 36.0 gm/dl Final    PLATELET 32/85/6493 497  140 - 450 1000/mm3 Final    MPV 03/20/2022 9.4  6.0 - 10.0 fL Final    RDW-SD 03/20/2022 47.5* 36.4 - 46.3   Final    NRBC 03/20/2022 0  0 - 0   Final    IMMATURE GRANULOCYTES 03/20/2022 0.2  0.0 - 3.0 % Final    Comment: IG - Immature granulocytes (promyelocytes + myelocytes + metamyelocytes), their presence  indicates a left shift. An IG > 3% may predict positive blood cultures with 98% specificity.  (P<0.04) and 92% Positive Predictive Value (Cady)1. Increased immature granulocytes  assist with the detection of infection and/or inflammation and may be present at an early stage  and are more sensitive and specific than band counts.  NEUTROPHILS 03/20/2022 51.2  34 - 64 % Final    LYMPHOCYTES 03/20/2022 38.4  28 - 48 % Final    MONOCYTES 03/20/2022 8.8  1 - 13 % Final    EOSINOPHILS 03/20/2022 1.1  0 - 5 % Final    BASOPHILS 03/20/2022 0.3  0 - 3 % Final    Epithelial cells, squamous 03/20/2022 >50  /LPF Final    WBC 03/20/2022 1-4  /HPF Final    Bacteria 03/20/2022 3+  /HPF Final    Sodium 03/21/2022 134* 136 - 145 mEq/L Final    Potassium 03/21/2022 3.5  3.5 - 5.1 mEq/L Final    Chloride 03/21/2022 102  98 - 107 mEq/L Final    CO2 03/21/2022 26  21 - 32 mEq/L Final    Glucose 03/21/2022 93  74 - 106 mg/dl Final    BUN 03/21/2022 9  7 - 25 mg/dl Final    Creatinine 03/21/2022 1.0  0.6 - 1.3 mg/dl Final    GFR est AA 03/21/2022 >60.0    Final    Comment: THE [facility] [employer] [facility] STATES THAT THE MDRD STUDY EQUATION SHOULD ONLY BE USED ON  INDIVIDUALS 18 OR OLDER.  THE REPORT ALSO NOTES THAT THE MDRD STUDY EQUATION HAS NOT BEEN  VALIDATED FOR USE WITH THE ELDERLY (OVER 79YEARS OF AGE), PREGNANT WOMEN, PATIENTS WITH SERIOUS  COMORBID CONDITIONS, OR PERSONS WITH EXTREMES OF BODY SIZE, MUSCLE MASS, OR NUTRITIONAL STATUS. APPLICATION OF THE EQUATION TO THESE PATIENT GROUPS MAY LEAD TO ERRORS IN GFR ESTIMATION. GFR  ESTIMATING EQUATIONS HAVE POORER AGREEMENT WITH MEASURED GFR FOR ILL HOSPITALIZED PATIENTS AND  FOR PEOPLE WITH NEAR NORMAL KIDNEY FUNCTION THAN FOR SUBJECTS IN THE MDRD STUDY. VALIDATION  STUDIES ARE IN PROGRESS TO EVALUATE THE MDRD STUDY EQUATION FOR ADDITIONAL ETHNIC GROUPS, THE  ELDERLY, VARIOUS DISEASE CONDITIONS, AND PEOPLE WITH NORMAL KIDNEY FUNCTION. GFRA----REFERS TO   GFRO---REFERS TO OTHER RACES    REFERENCES AVAILABLE UPON REQUEST.        GFR est non-AA 03/21/2022 >60    Final    Calcium 03/21/2022 8.6  8.5 - 10.1 mg/dl Final    AST (SGOT) 03/21/2022 29  15 - 37 U/L Final    ALT (SGPT) 03/21/2022 19  12 - 78 U/L Final    Alk. phosphatase 03/21/2022 88  45 - 117 U/L Final    Bilirubin, total 03/21/2022 0.7  0.2 - 1.0 mg/dl Final    Protein, total 03/21/2022 9.6* 6.4 - 8.2 gm/dl Final    Albumin 03/21/2022 3.4  3.4 - 5.0 gm/dl Final    Anion gap 03/21/2022 6  5 - 15 mmol/L Final    Magnesium 03/21/2022 2.3  1.6 - 2.6 mg/dl Final    WBC 03/21/2022 7.3  4.0 - 11.0 1000/mm3 Final    RBC 03/21/2022 4.11  3.60 - 5.20 M/uL Final    HGB 03/21/2022 10.4* 13.0 - 17.2 gm/dl Final    HCT 03/21/2022 33.1* 37.0 - 50.0 % Final    MCV 03/21/2022 80.5  80.0 - 98.0 fL Final    MCH 03/21/2022 25.3* 25.4 - 34.6 pg Final    MCHC 03/21/2022 31.4  30.0 - 36.0 gm/dl Final    PLATELET 78/50/4620 138  140 - 450 1000/mm3 Final    MPV 03/21/2022 9.0  6.0 - 10.0 fL Final    RDW-SD 03/21/2022 47.5* 36.4 - 46.3   Final    NRBC 03/21/2022 0  0 - 0   Final    IMMATURE GRANULOCYTES 03/21/2022 0.3  0.0 - 3.0 % Final    Comment: IG - Immature granulocytes (promyelocytes + myelocytes + metamyelocytes), their presence  indicates a left shift.  An IG > 3% may predict positive blood cultures with 98% specificity.  (P<0.04) and 92% Positive Predictive Value (Tae-Mattie)1. Increased immature granulocytes  assist with the detection of infection and/or inflammation and may be present at an early stage  and are more sensitive and specific than band counts.  NEUTROPHILS 03/21/2022 45.8  34 - 64 % Final    LYMPHOCYTES 03/21/2022 41.9  28 - 48 % Final    MONOCYTES 03/21/2022 9.8  1 - 13 % Final    EOSINOPHILS 03/21/2022 1.9  0 - 5 % Final    BASOPHILS 03/21/2022 0.3  0 - 3 % Final   Admission on 03/18/2022, Discharged on 03/18/2022   Component Date Value Ref Range Status    Ventricular Rate 03/18/2022 126  BPM Final    Atrial Rate 03/18/2022 126  BPM Final    P-R Interval 03/18/2022 148  ms Final    QRS Duration 03/18/2022 74  ms Final    Q-T Interval 03/18/2022 314  ms Final    QTC Calculation (Bezet) 03/18/2022 454  ms Final    Calculated P Axis 03/18/2022 63  degrees Final    Calculated R Axis 03/18/2022 46  degrees Final    Calculated T Axis 03/18/2022 64  degrees Final    Diagnosis 03/18/2022    Final                    Value:Sinus tachycardia  Nonspecific T wave abnormality  Abnormal ECG  No previous ECGs available  Confirmed by Myron Guerra M.D., Jonna Meka ([id number]) on 3/19/2022 3:53:58 PM      WBC 03/18/2022 7.9  4.0 - 11.0 1000/mm3 Final    RBC 03/18/2022 4.32  3.60 - 5.20 M/uL Final    HGB 03/18/2022 10.8* 13.0 - 17.2 gm/dl Final    HCT 03/18/2022 34.5* 37.0 - 50.0 % Final    MCV 03/18/2022 79.9* 80.0 - 98.0 fL Final    MCH 03/18/2022 25.0* 25.4 - 34.6 pg Final    MCHC 03/18/2022 31.3  30.0 - 36.0 gm/dl Final    PLATELET 72/32/8033 514  140 - 450 1000/mm3 Final    MPV 03/18/2022 9.0  6.0 - 10.0 fL Final    RDW-SD 03/18/2022 46.9* 36.4 - 46.3   Final    NRBC 03/18/2022 0  0 - 0   Final    IMMATURE GRANULOCYTES 03/18/2022 0.3  0.0 - 3.0 % Final    Comment: IG - Immature granulocytes (promyelocytes + myelocytes + metamyelocytes), their presence  indicates a left shift.  An IG > 3% may predict positive blood cultures with 98% specificity.  (P<0.04) and 92% Positive Predictive Value (Cady)1. Increased immature granulocytes  assist with the detection of infection and/or inflammation and may be present at an early stage  and are more sensitive and specific than band counts.  NEUTROPHILS 03/18/2022 46.5  34 - 64 % Final    LYMPHOCYTES 03/18/2022 42.1  28 - 48 % Final    MONOCYTES 03/18/2022 9.6  1 - 13 % Final    EOSINOPHILS 03/18/2022 1.1  0 - 5 % Final    BASOPHILS 03/18/2022 0.4  0 - 3 % Final    Sodium 03/18/2022 136  136 - 145 mEq/L Final    Potassium 03/18/2022 3.6  3.5 - 5.1 mEq/L Final    Chloride 03/18/2022 104  98 - 107 mEq/L Final    CO2 03/18/2022 25  21 - 32 mEq/L Final    Glucose 03/18/2022 100  74 - 106 mg/dl Final    BUN 03/18/2022 8  7 - 25 mg/dl Final    Creatinine 03/18/2022 1.1  0.6 - 1.3 mg/dl Final    GFR est AA 03/18/2022 >60.0    Final    Comment: THE [facility] STATES THAT THE MDRD STUDY EQUATION SHOULD ONLY BE USED ON  INDIVIDUALS 18 OR OLDER. THE REPORT ALSO NOTES THAT THE MDRD STUDY EQUATION HAS NOT BEEN  VALIDATED FOR USE WITH THE ELDERLY (OVER 79YEARS OF AGE), PREGNANT WOMEN, PATIENTS WITH SERIOUS  COMORBID CONDITIONS, OR PERSONS WITH EXTREMES OF BODY SIZE, MUSCLE MASS, OR NUTRITIONAL STATUS. APPLICATION OF THE EQUATION TO THESE PATIENT GROUPS MAY LEAD TO ERRORS IN GFR ESTIMATION. GFR  ESTIMATING EQUATIONS HAVE POORER AGREEMENT WITH MEASURED GFR FOR ILL HOSPITALIZED PATIENTS AND  FOR PEOPLE WITH NEAR NORMAL KIDNEY FUNCTION THAN FOR SUBJECTS IN THE MDRD STUDY. VALIDATION  STUDIES ARE IN PROGRESS TO EVALUATE THE MDRD STUDY EQUATION FOR ADDITIONAL ETHNIC GROUPS, THE  ELDERLY, VARIOUS DISEASE CONDITIONS, AND PEOPLE WITH NORMAL KIDNEY FUNCTION.     GFRA----REFERS TO   GFRO---REFERS TO OTHER RACES    REFERENCES AVAILABLE UPON REQUEST.        GFR est non-AA 03/18/2022 54    Final    Calcium 03/18/2022 9.3  8.5 - 10.1 mg/dl Final    Anion gap 03/18/2022 8  5 - 15 mmol/L Final  Troponin-High Sensitivity 03/18/2022 <3  0 - 59 ng/L Final    Comment: Up to 50% of normal patients may have low levels of detectable troponin (within reference range)  circulating in the blood. Mild elevations in troponin that are above the reference range may  present with other cardiac and non-cardiac disease states. Two or three serial tests at two hour  intervals are recommended to evaluate changes in circulating troponin over time. Note: The reference range is based on the 99th percentile of combined male and female  population.  Troponin-High Sensitivity 03/18/2022 <3  0 - 59 ng/L Final    Comment: Up to 50% of normal patients may have low levels of detectable troponin (within reference range)  circulating in the blood. Mild elevations in troponin that are above the reference range may  present with other cardiac and non-cardiac disease states. Two or three serial tests at two hour  intervals are recommended to evaluate changes in circulating troponin over time. Note: The reference range is based on the 99th percentile of combined male and female  population.  AST (SGOT) 03/18/2022 69* 15 - 37 U/L Final    ALT (SGPT) 03/18/2022 26  12 - 78 U/L Final    Alk. phosphatase 03/18/2022 102  45 - 117 U/L Final    Bilirubin, total 03/18/2022 1.7* 0.2 - 1.0 mg/dl Final    Protein, total 03/18/2022 10.3* 6.4 - 8.2 gm/dl Final    Albumin 03/18/2022 3.2* 3.4 - 5.0 gm/dl Final    Bilirubin, direct 03/18/2022 <0.1  0.0 - 0.2 mg/dl Final    Lipase 03/18/2022 295  73 - 393 U/L Final    D DIMER 03/18/2022 1.15* 0.01 - 0.50 ug/mL (FEU) Final    THIS TEST IS FDA APPROVED TO EXCLUDE DEEP VEIN THROMBOSIS AND PULMONARY EMBOLISM. No results found for any visits on 04/13/22. Patient Care Team:  Patient Care Team:  Leann Boudreaux NP as PCP - General (Nurse Practitioner)  Leann Boudreaux NP as PCP - [facility] Provider      Assessment / Plan:      ICD-10-CM ICD-9-CM    1. Hypertension, unspecified type  I10 401.9      Continue current treatment plan. Negative for side effects. Follow-up and Dispositions    · Return in about 4 months (around 8/13/2022). I asked the patient if she  had any questions and answered her  questions. The patient stated that she understands the treatment plan and agrees with the treatment plan    This document was created with a voice activated dictation system and may contain transcription errors.

## 2022-05-18 ENCOUNTER — TELEPHONE (OUTPATIENT)
Dept: MAMMOGRAPHY | Age: 57
End: 2022-05-18

## 2022-06-23 DIAGNOSIS — I69.359 CVA, OLD, HEMIPARESIS (HCC): ICD-10-CM

## 2022-06-23 DIAGNOSIS — I10 ESSENTIAL HYPERTENSION: ICD-10-CM

## 2022-06-23 RX ORDER — CLOPIDOGREL BISULFATE 75 MG/1
75 TABLET ORAL DAILY
Qty: 30 TABLET | Refills: 2 | Status: SHIPPED | OUTPATIENT
Start: 2022-06-23

## 2022-06-23 RX ORDER — AMLODIPINE BESYLATE 10 MG/1
10 TABLET ORAL DAILY
Qty: 30 TABLET | Refills: 2 | Status: SHIPPED | OUTPATIENT
Start: 2022-06-23 | End: 2022-08-10 | Stop reason: SDUPTHER

## 2022-06-24 DIAGNOSIS — I69.359 CVA, OLD, HEMIPARESIS (HCC): ICD-10-CM

## 2022-06-24 RX ORDER — ASPIRIN 81 MG/1
81 TABLET ORAL DAILY
Qty: 30 TABLET | Refills: 3 | Status: SHIPPED | OUTPATIENT
Start: 2022-06-24 | End: 2022-08-10 | Stop reason: SDUPTHER

## 2022-08-10 ENCOUNTER — OFFICE VISIT (OUTPATIENT)
Dept: FAMILY MEDICINE CLINIC | Age: 57
End: 2022-08-10
Payer: COMMERCIAL

## 2022-08-10 ENCOUNTER — HOSPITAL ENCOUNTER (OUTPATIENT)
Dept: LAB | Age: 57
Discharge: HOME | End: 2022-08-10
Payer: MEDICAID

## 2022-08-10 VITALS
HEIGHT: 66 IN | SYSTOLIC BLOOD PRESSURE: 116 MMHG | HEART RATE: 124 BPM | TEMPERATURE: 97.5 F | BODY MASS INDEX: 26.68 KG/M2 | WEIGHT: 166 LBS | DIASTOLIC BLOOD PRESSURE: 83 MMHG | OXYGEN SATURATION: 98 % | RESPIRATION RATE: 16 BRPM

## 2022-08-10 DIAGNOSIS — R56.9 SEIZURE (HCC): ICD-10-CM

## 2022-08-10 DIAGNOSIS — M24.541 CONTRACTURE OF HAND JOINT, RIGHT: ICD-10-CM

## 2022-08-10 DIAGNOSIS — R73.03 PREDIABETES: ICD-10-CM

## 2022-08-10 DIAGNOSIS — E78.2 MIXED HYPERLIPIDEMIA: ICD-10-CM

## 2022-08-10 DIAGNOSIS — Z11.59 ENCOUNTER FOR HEPATITIS C SCREENING TEST FOR LOW RISK PATIENT: ICD-10-CM

## 2022-08-10 DIAGNOSIS — D64.9 ANEMIA, UNSPECIFIED TYPE: ICD-10-CM

## 2022-08-10 DIAGNOSIS — I69.359 CVA, OLD, HEMIPARESIS (HCC): ICD-10-CM

## 2022-08-10 DIAGNOSIS — Z12.31 BREAST CANCER SCREENING BY MAMMOGRAM: ICD-10-CM

## 2022-08-10 DIAGNOSIS — M79.674 CHRONIC PAIN OF TOE, RIGHT: ICD-10-CM

## 2022-08-10 DIAGNOSIS — G89.29 CHRONIC PAIN OF TOE, RIGHT: ICD-10-CM

## 2022-08-10 DIAGNOSIS — I10 ESSENTIAL HYPERTENSION: ICD-10-CM

## 2022-08-10 DIAGNOSIS — I10 ESSENTIAL HYPERTENSION: Primary | ICD-10-CM

## 2022-08-10 LAB
ALBUMIN SERPL-MCNC: 3.5 G/DL (ref 3.4–5)
ALBUMIN/GLOB SERPL: 0.6 {RATIO} (ref 0.8–1.7)
ALP SERPL-CCNC: 105 U/L (ref 45–117)
ALT SERPL-CCNC: 17 U/L (ref 13–56)
ANION GAP SERPL CALC-SCNC: 10 MMOL/L (ref 3–18)
AST SERPL-CCNC: 32 U/L (ref 10–38)
BASOPHILS # BLD: 0 K/UL (ref 0–0.1)
BASOPHILS NFR BLD: 0 % (ref 0–2)
BILIRUB SERPL-MCNC: 0.4 MG/DL (ref 0.2–1)
BUN SERPL-MCNC: 4 MG/DL (ref 7–18)
BUN/CREAT SERPL: 5 (ref 12–20)
CALCIUM SERPL-MCNC: 9.2 MG/DL (ref 8.5–10.1)
CHLORIDE SERPL-SCNC: 105 MMOL/L (ref 100–111)
CHOLEST SERPL-MCNC: 88 MG/DL
CO2 SERPL-SCNC: 23 MMOL/L (ref 21–32)
CREAT SERPL-MCNC: 0.81 MG/DL (ref 0.6–1.3)
DIFFERENTIAL METHOD BLD: ABNORMAL
EOSINOPHIL # BLD: 0.1 K/UL (ref 0–0.4)
EOSINOPHIL NFR BLD: 2 % (ref 0–5)
ERYTHROCYTE [DISTWIDTH] IN BLOOD BY AUTOMATED COUNT: 16.9 % (ref 11.6–14.5)
EST. AVERAGE GLUCOSE BLD GHB EST-MCNC: 120 MG/DL
FERRITIN SERPL-MCNC: 14 NG/ML (ref 8–388)
GLOBULIN SER CALC-MCNC: 6.3 G/DL (ref 2–4)
GLUCOSE SERPL-MCNC: 129 MG/DL (ref 74–99)
HBA1C MFR BLD: 5.8 % (ref 4.2–5.6)
HCT VFR BLD AUTO: 34.8 % (ref 35–45)
HDLC SERPL-MCNC: 35 MG/DL (ref 40–60)
HDLC SERPL: 2.5 {RATIO} (ref 0–5)
HGB BLD-MCNC: 10.3 G/DL (ref 12–16)
IMM GRANULOCYTES # BLD AUTO: 0 K/UL (ref 0–0.04)
IMM GRANULOCYTES NFR BLD AUTO: 0 % (ref 0–0.5)
IRON SATN MFR SERPL: 6 % (ref 20–50)
IRON SERPL-MCNC: 28 UG/DL (ref 50–175)
LDLC SERPL CALC-MCNC: 33.6 MG/DL (ref 0–100)
LIPID PROFILE,FLP: ABNORMAL
LYMPHOCYTES # BLD: 3.4 K/UL (ref 0.9–3.6)
LYMPHOCYTES NFR BLD: 49 % (ref 21–52)
MCH RBC QN AUTO: 22.7 PG (ref 24–34)
MCHC RBC AUTO-ENTMCNC: 29.6 G/DL (ref 31–37)
MCV RBC AUTO: 76.7 FL (ref 78–100)
MONOCYTES # BLD: 0.6 K/UL (ref 0.05–1.2)
MONOCYTES NFR BLD: 9 % (ref 3–10)
NEUTS SEG # BLD: 2.7 K/UL (ref 1.8–8)
NEUTS SEG NFR BLD: 40 % (ref 40–73)
NRBC # BLD: 0 K/UL (ref 0–0.01)
NRBC BLD-RTO: 0 PER 100 WBC
PLATELET # BLD AUTO: 434 K/UL (ref 135–420)
PMV BLD AUTO: 9.3 FL (ref 9.2–11.8)
POTASSIUM SERPL-SCNC: 3.3 MMOL/L (ref 3.5–5.5)
PROT SERPL-MCNC: 9.8 G/DL (ref 6.4–8.2)
RBC # BLD AUTO: 4.54 M/UL (ref 4.2–5.3)
SODIUM SERPL-SCNC: 138 MMOL/L (ref 136–145)
TIBC SERPL-MCNC: 446 UG/DL (ref 250–450)
TRIGL SERPL-MCNC: 97 MG/DL (ref ?–150)
VLDLC SERPL CALC-MCNC: 19.4 MG/DL
WBC # BLD AUTO: 6.8 K/UL (ref 4.6–13.2)

## 2022-08-10 PROCEDURE — 99214 OFFICE O/P EST MOD 30 MIN: CPT | Performed by: STUDENT IN AN ORGANIZED HEALTH CARE EDUCATION/TRAINING PROGRAM

## 2022-08-10 PROCEDURE — 80053 COMPREHEN METABOLIC PANEL: CPT

## 2022-08-10 PROCEDURE — 85025 COMPLETE CBC W/AUTO DIFF WBC: CPT

## 2022-08-10 PROCEDURE — 82728 ASSAY OF FERRITIN: CPT

## 2022-08-10 PROCEDURE — 83036 HEMOGLOBIN GLYCOSYLATED A1C: CPT

## 2022-08-10 PROCEDURE — 80061 LIPID PANEL: CPT

## 2022-08-10 PROCEDURE — 83540 ASSAY OF IRON: CPT

## 2022-08-10 PROCEDURE — 86803 HEPATITIS C AB TEST: CPT

## 2022-08-10 PROCEDURE — 36415 COLL VENOUS BLD VENIPUNCTURE: CPT

## 2022-08-10 RX ORDER — ASPIRIN 81 MG/1
81 TABLET ORAL DAILY
Qty: 90 TABLET | Refills: 1 | Status: SHIPPED | OUTPATIENT
Start: 2022-08-10

## 2022-08-10 RX ORDER — AMLODIPINE BESYLATE 10 MG/1
10 TABLET ORAL DAILY
Qty: 90 TABLET | Refills: 1 | Status: SHIPPED | OUTPATIENT
Start: 2022-08-10

## 2022-08-10 RX ORDER — ATORVASTATIN CALCIUM 40 MG/1
40 TABLET, FILM COATED ORAL DAILY
Qty: 90 TABLET | Refills: 1 | Status: SHIPPED | OUTPATIENT
Start: 2022-08-10

## 2022-08-10 RX ORDER — LISINOPRIL 20 MG/1
20 TABLET ORAL DAILY
Qty: 90 TABLET | Refills: 1 | Status: SHIPPED | OUTPATIENT
Start: 2022-08-10

## 2022-08-10 RX ORDER — LEVETIRACETAM 500 MG/1
TABLET ORAL
Qty: 60 TABLET | Refills: 4 | Status: SHIPPED | OUTPATIENT
Start: 2022-08-10

## 2022-08-10 RX ORDER — HYDROGEN PEROXIDE 3 %
40 SOLUTION, NON-ORAL MISCELLANEOUS 2 TIMES DAILY
Qty: 60 CAPSULE | Refills: 1 | Status: SHIPPED | OUTPATIENT
Start: 2022-08-10

## 2022-08-10 NOTE — PROGRESS NOTES
Mely Keyes is a 59 y.o. female presenting today for Foot Pain and Establish Care  . Chief Complaint   Patient presents with    Foot Pain    Establish Care       HPI:  Mely Keyes presents to the office today to establish care. Patient has a past medical history of HTN, CVA, HLD, anemia. HTN : She is prescribed amlodipine and lisinopril. Denies any chest pain, palpitations, headaches or dizziness. CVA: She had a stroke in 2011. She has Rt sided  residual deficit. Seizures: Patient is taking Keppra. No recent seizures. Hiatal hernia: Patient was noted to have anemia and hiatal hernia. She was prescribed a PPI. Today, she is complaining of pain in her Rt foot. She has numbness/tingling in her Rt side. Patient has multiple healthcare gaps. Review of Systems   Constitutional:  Negative for chills, diaphoresis, fever, malaise/fatigue and weight loss. HENT:  Negative for congestion, ear discharge, ear pain, hearing loss, nosebleeds, sinus pain, sore throat and tinnitus. Eyes:  Negative for blurred vision, double vision and photophobia. Respiratory:  Negative for cough, sputum production, shortness of breath, wheezing and stridor. Cardiovascular:  Negative for chest pain, palpitations, orthopnea, claudication and leg swelling. Gastrointestinal:  Negative for abdominal pain, constipation, diarrhea, heartburn, nausea and vomiting. Genitourinary:  Negative for dysuria, flank pain, frequency, hematuria and urgency. Musculoskeletal:  Positive for back pain and joint pain. Negative for myalgias and neck pain. Skin:  Negative for rash. Neurological:  Positive for tingling, sensory change, speech change, focal weakness and weakness. Negative for tremors, seizures and headaches. Psychiatric/Behavioral:  Negative for depression. The patient is not nervous/anxious. All other systems reviewed and are negative.     Allergies   Allergen Reactions    Aggrenox [Aspirin-Dipyridamole] Swelling       PHQ Screening   3 most recent PHQ Screens 4/13/2022   Little interest or pleasure in doing things Not at all   Feeling down, depressed, irritable, or hopeless Not at all   Total Score PHQ 2 0       History  Past Medical History:   Diagnosis Date    CVA (cerebral vascular accident) ([facility]     HLD (hyperlipidemia)     HTN (hypertension)     Right hemiparesis ([facility]     [facility]        History reviewed. No pertinent surgical history. Social History     Socioeconomic History    Marital status: SINGLE     Spouse name: Not on file    Number of children: Not on file    Years of education: Not on file    Highest education level: Not on file   Occupational History    Not on file   Tobacco Use    Smoking status: Never    Smokeless tobacco: Never   Substance and Sexual Activity    Alcohol use: Never    Drug use: Never    Sexual activity: Not Currently   Other Topics Concern    Not on file   Social History Narrative    Not on file     Social Determinants of Health     Financial Resource Strain: Not on file   Food Insecurity: Not on file   Transportation Needs: Not on file   Physical Activity: Not on file   Stress: Not on file   Social Connections: Not on file   Intimate Partner Violence: Not on file   Housing Stability: Not on file       Current Outpatient Medications   Medication Sig Dispense Refill    aspirin delayed-release 81 mg tablet Take 1 Tablet by mouth in the morning. 90 Tablet 1    atorvastatin (LIPITOR) 40 mg tablet Take 1 Tablet by mouth in the morning. 90 Tablet 1    amLODIPine (NORVASC) 10 mg tablet Take 1 Tablet by mouth in the morning. 90 Tablet 1    esomeprazole (NexIUM) 20 mg capsule Take 2 Capsules by mouth two (2) times a day. 60 Capsule 1    lisinopriL (PRINIVIL, ZESTRIL) 20 mg tablet Take 1 Tablet by mouth in the morning. 90 Tablet 1    levETIRAcetam (KEPPRA) 500 mg tablet TAKE 1 TABLET BY MOUTH TWO TIMES A DAY.  60 Tablet 4    clopidogreL (PLAVIX) 75 mg tab Take 1 Tablet by mouth daily. 30 Tablet 2    polyethylene glycol (Miralax) 17 gram/dose powder Take 17 g by mouth daily. 1 tablespoon with 8 oz of water daily 210 g 0         Vitals:    08/10/22 0937   BP: 116/83   Pulse: (!) 124   Resp: 16   Temp: 97.5 °F (36.4 °C)   TempSrc: Temporal   SpO2: 98%   Weight: 166 lb (75.3 kg)   Height: 5' 6\" (1.676 m)   PainSc:   0 - No pain       Physical Exam  Vitals and nursing note reviewed. Constitutional:       General: She is not in acute distress. Appearance: Normal appearance. She is not ill-appearing, toxic-appearing or diaphoretic. HENT:      Head: Normocephalic and atraumatic. Cardiovascular:      Rate and Rhythm: Regular rhythm. Tachycardia present. Pulses: Normal pulses. Heart sounds: No murmur heard. Pulmonary:      Effort: Pulmonary effort is normal. No respiratory distress. Breath sounds: Normal breath sounds. No wheezing or rales. Musculoskeletal:      Cervical back: Normal range of motion. Right lower leg: No edema. Left lower leg: No edema. Comments: Right hand contracture. Right-sided weakness. Right foot with old surgical well-healed scar. Skin:     General: Skin is warm and dry. Coloration: Skin is not jaundiced or pale. Neurological:      General: No focal deficit present. Mental Status: She is alert and oriented to person, place, and time. Mental status is at baseline. Motor: Weakness present. Gait: Gait abnormal.      Comments: Using a walker to ambulate. Speech slightly slurred. Psychiatric:         Mood and Affect: Mood normal.         Behavior: Behavior normal.         Thought Content: Thought content normal.         Judgment: Judgment normal.       No visits with results within 3 Month(s) from this visit.    Latest known visit with results is:   Admission on 03/19/2022, Discharged on 03/22/2022   Component Date Value Ref Range Status    Ventricular Rate 03/19/2022 106  BPM Final    Atrial Rate 03/19/2022 106  BPM Final    P-R Interval 03/19/2022 150  ms Final    QRS Duration 03/19/2022 68  ms Final    Q-T Interval 03/19/2022 350  ms Final    QTC Calculation (Bezet) 03/19/2022 464  ms Final    Calculated P Axis 03/19/2022 49  degrees Final    Calculated R Axis 03/19/2022 17  degrees Final    Calculated T Axis 03/19/2022 28  degrees Final    Diagnosis 03/19/2022    Final                    Value:Sinus tachycardia  Possible Left atrial enlargement  Nonspecific T wave abnormality  Abnormal ECG  When compared with ECG of 18-MAR-2022 16:01,  No significant change was found  Confirmed by Zulay Calderon M.D., Willie Sarabia ([id number]) on 3/20/2022 3:52:34 PM      WBC 03/19/2022 6.1  4.0 - 11.0 1000/mm3 Final    RBC 03/19/2022 4.36  3.60 - 5.20 M/uL Final    HGB 03/19/2022 11.0 (A) 13.0 - 17.2 gm/dl Final    HCT 03/19/2022 34.8 (A) 37.0 - 50.0 % Final    MCV 03/19/2022 79.8 (A) 80.0 - 98.0 fL Final    MCH 03/19/2022 25.2 (A) 25.4 - 34.6 pg Final    MCHC 03/19/2022 31.6  30.0 - 36.0 gm/dl Final    PLATELET 86/27/0511 871  140 - 450 1000/mm3 Final    MPV 03/19/2022 9.1  6.0 - 10.0 fL Final    RDW-SD 03/19/2022 47.8 (A) 36.4 - 46.3   Final    NRBC 03/19/2022 0  0 - 0   Final    IMMATURE GRANULOCYTES 03/19/2022 0.2  0.0 - 3.0 % Final    Comment: IG - Immature granulocytes (promyelocytes + myelocytes + metamyelocytes), their presence  indicates a left shift. An IG > 3% may predict positive blood cultures with 98% specificity.  (P<0.04) and 92% Positive Predictive Value (Tae-Mattie)1. Increased immature granulocytes  assist with the detection of infection and/or inflammation and may be present at an early stage  and are more sensitive and specific than band counts.         NEUTROPHILS 03/19/2022 51.2  34 - 64 % Final    LYMPHOCYTES 03/19/2022 38.1  28 - 48 % Final    MONOCYTES 03/19/2022 8.6  1 - 13 % Final    EOSINOPHILS 03/19/2022 1.6  0 - 5 % Final    BASOPHILS 03/19/2022 0.3  0 - 3 % Final    Troponin-High Sensitivity 03/19/2022 3 0 - 59 ng/L Final    Comment: Up to 50% of normal patients may have low levels of detectable troponin (within reference range)  circulating in the blood. Mild elevations in troponin that are above the reference range may  present with other cardiac and non-cardiac disease states. Two or three serial tests at two hour  intervals are recommended to evaluate changes in circulating troponin over time. Note: The reference range is based on the 99th percentile of combined male and female  population. Sodium 03/19/2022 135 (A) 136 - 145 mEq/L Final    Potassium 03/19/2022 3.6  3.5 - 5.1 mEq/L Final    Chloride 03/19/2022 104  98 - 107 mEq/L Final    CO2 03/19/2022 25  21 - 32 mEq/L Final    Glucose 03/19/2022 89  74 - 106 mg/dl Final    BUN 03/19/2022 9  7 - 25 mg/dl Final    Creatinine 03/19/2022 1.1  0.6 - 1.3 mg/dl Final    GFR est AA 03/19/2022 >60.0    Final    Comment: THE [facility] [employer] GROUP STATES THAT THE MDRD STUDY EQUATION SHOULD ONLY BE USED ON  INDIVIDUALS 18 OR OLDER. THE REPORT ALSO NOTES THAT THE MDRD STUDY EQUATION HAS NOT BEEN  VALIDATED FOR USE WITH THE ELDERLY (OVER 79YEARS OF AGE), PREGNANT WOMEN, PATIENTS WITH SERIOUS  COMORBID CONDITIONS, OR PERSONS WITH EXTREMES OF BODY SIZE, MUSCLE MASS, OR NUTRITIONAL STATUS. APPLICATION OF THE EQUATION TO THESE PATIENT GROUPS MAY LEAD TO ERRORS IN GFR ESTIMATION. GFR  ESTIMATING EQUATIONS HAVE POORER AGREEMENT WITH MEASURED GFR FOR ILL HOSPITALIZED PATIENTS AND  FOR PEOPLE WITH NEAR NORMAL KIDNEY FUNCTION THAN FOR SUBJECTS IN THE MDRD STUDY. VALIDATION  STUDIES ARE IN PROGRESS TO EVALUATE THE MDRD STUDY EQUATION FOR ADDITIONAL ETHNIC GROUPS, THE  ELDERLY, VARIOUS DISEASE CONDITIONS, AND PEOPLE WITH NORMAL KIDNEY FUNCTION. GFRA----REFERS TO   GFRO---REFERS TO OTHER RACES    REFERENCES AVAILABLE UPON REQUEST.         GFR est non-AA 03/19/2022 54    Final    Calcium 03/19/2022 9.0  8.5 - 10.1 mg/dl Final    AST (SGOT) 03/19/2022 29  15 - 37 U/L Final    ALT (SGPT) 03/19/2022 22  12 - 78 U/L Final    Alk. phosphatase 03/19/2022 107  45 - 117 U/L Final    Bilirubin, total 03/19/2022 0.5  0.2 - 1.0 mg/dl Final    Protein, total 03/19/2022 10.3 (A) 6.4 - 8.2 gm/dl Final    Albumin 03/19/2022 3.6  3.4 - 5.0 gm/dl Final    Anion gap 03/19/2022 6  5 - 15 mmol/L Final    Magnesium 03/20/2022 2.5  1.6 - 2.6 mg/dl Final    Color 03/20/2022 YELLOW  YELLOW,STRAW   Final    Appearance 03/20/2022 CLEAR  CLEAR   Final    Glucose 03/20/2022 NEGATIVE  NEGATIVE,Negative mg/dl Final    Bilirubin 03/20/2022 NEGATIVE  NEGATIVE,Negative   Final    Ketone 03/20/2022 NEGATIVE  NEGATIVE,Negative mg/dl Final    Specific gravity 03/20/2022 1.025  1.005 - 1.030   Final    Blood 03/20/2022 NEGATIVE  NEGATIVE,Negative   Final    pH (UA) 03/20/2022 5.0  5.0 - 9.0   Final    Protein 03/20/2022 NEGATIVE  NEGATIVE,Negative mg/dl Final    Urobilinogen 03/20/2022 0.2  0.0 - 1.0 mg/dl Final    Nitrites 03/20/2022 NEGATIVE  NEGATIVE,Negative   Final    Leukocyte Esterase 03/20/2022 TRACE (A) NEGATIVE,Negative   Final    Amphetamine 03/20/2022 NEGATIVE  NEGATIVE   Final    Comment: DISCLAIMER: THIS DRUG SCREENING PROCEDURE IS ONLY FOR MEDICAL PURPOSES AND NOT FOR CHAIN OF  CUSTODY. THRESHOLD (CUTOFF): 1000 ng/mL      Barbiturates 03/20/2022 NEGATIVE  NEGATIVE   Final    Comment: DISCLAIMER: THIS DRUG SCREENING PROCEDURE IS ONLY FOR MEDICAL PURPOSES AND NOT FOR CHAIN OF  CUSTODY. THRESHOLD (CUTOFF): 200 ng/mL      Benzodiazepines 03/20/2022 NEGATIVE  NEGATIVE   Final    Comment: DISCLAIMER: THIS DRUG SCREENING PROCEDURE IS ONLY FOR MEDICAL PURPOSES AND NOT FOR CHAIN OF  CUSTODY. THRESHOLD (CUTOFF): 200 ng/mL      Cocaine 03/20/2022 NEGATIVE  NEGATIVE   Final    Comment: DISCLAIMER: THIS DRUG SCREENING PROCEDURE IS ONLY FOR MEDICAL PURPOSES AND NOT FOR CHAIN OF  CUSTODY.   THRESHOLD (CUTOFF): 300 ng/mL      Marijuana 03/20/2022 NEGATIVE  NEGATIVE   Final    Comment: DISCLAIMER: THIS DRUG SCREENING PROCEDURE IS ONLY FOR MEDICAL PURPOSES AND NOT FOR CHAIN OF  CUSTODY. THRESHOLD (CUTOFF): 50 ng/mL      Methadone 03/20/2022 NEGATIVE  NEGATIVE   Final    Comment: DISCLAIMER: THIS DRUG SCREENING PROCEDURE IS ONLY FOR MEDICAL PURPOSES AND NOT FOR CHAIN OF  CUSTODY. THRESHOLD (CUTOFF): 300 ng/mL      Opiates 03/20/2022 POSITIVE (A) NEGATIVE   Final    Comment: DISCLAIMER: THIS DRUG SCREENING PROCEDURE IS ONLY FOR MEDICAL PURPOSES AND NOT FOR CHAIN OF  CUSTODY. THRESHOLD (CUTOFF): 300 ng/mL      Phencyclidine 03/20/2022 NEGATIVE  NEGATIVE   Final    Comment: DISCLAIMER: THIS DRUG SCREENING PROCEDURE IS ONLY FOR MEDICAL PURPOSES AND NOT FOR CHAIN OF  CUSTODY. THRESHOLD (CUTOFF): 25 ng/mL      Sodium 03/20/2022 135 (A) 136 - 145 mEq/L Final    Potassium 03/20/2022 4.0  3.5 - 5.1 mEq/L Final    Chloride 03/20/2022 104  98 - 107 mEq/L Final    CO2 03/20/2022 25  21 - 32 mEq/L Final    Glucose 03/20/2022 90  74 - 106 mg/dl Final    BUN 03/20/2022 12  7 - 25 mg/dl Final    Creatinine 03/20/2022 1.0  0.6 - 1.3 mg/dl Final    GFR est AA 03/20/2022 >60.0    Final    Comment: THE [facility] [employer] GROUP STATES THAT THE MDRD STUDY EQUATION SHOULD ONLY BE USED ON  INDIVIDUALS 18 OR OLDER. THE REPORT ALSO NOTES THAT THE MDRD STUDY EQUATION HAS NOT BEEN  VALIDATED FOR USE WITH THE ELDERLY (OVER 79YEARS OF AGE), PREGNANT WOMEN, PATIENTS WITH SERIOUS  COMORBID CONDITIONS, OR PERSONS WITH EXTREMES OF BODY SIZE, MUSCLE MASS, OR NUTRITIONAL STATUS. APPLICATION OF THE EQUATION TO THESE PATIENT GROUPS MAY LEAD TO ERRORS IN GFR ESTIMATION. GFR  ESTIMATING EQUATIONS HAVE POORER AGREEMENT WITH MEASURED GFR FOR ILL HOSPITALIZED PATIENTS AND  FOR PEOPLE WITH NEAR NORMAL KIDNEY FUNCTION THAN FOR SUBJECTS IN THE MDRD STUDY.  VALIDATION  STUDIES ARE IN PROGRESS TO EVALUATE THE MDRD STUDY EQUATION FOR ADDITIONAL ETHNIC GROUPS, THE  ELDERLY, VARIOUS DISEASE CONDITIONS, AND PEOPLE WITH NORMAL KIDNEY FUNCTION. GFRA----REFERS TO   GFRO---REFERS TO OTHER RACES    REFERENCES AVAILABLE UPON REQUEST. GFR est non-AA 03/20/2022 >60    Final    Calcium 03/20/2022 8.7  8.5 - 10.1 mg/dl Final    AST (SGOT) 03/20/2022 28  15 - 37 U/L Final    ALT (SGPT) 03/20/2022 21  12 - 78 U/L Final    Alk. phosphatase 03/20/2022 94  45 - 117 U/L Final    Bilirubin, total 03/20/2022 1.5 (A) 0.2 - 1.0 mg/dl Final    Protein, total 03/20/2022 9.4 (A) 6.4 - 8.2 gm/dl Final    Albumin 03/20/2022 3.1 (A) 3.4 - 5.0 gm/dl Final    Anion gap 03/20/2022 7  5 - 15 mmol/L Final    Magnesium 03/20/2022 2.4  1.6 - 2.6 mg/dl Final    WBC 03/20/2022 6.6  4.0 - 11.0 1000/mm3 Final    RBC 03/20/2022 3.88  3.60 - 5.20 M/uL Final    HGB 03/20/2022 9.7 (A) 13.0 - 17.2 gm/dl Final    HCT 03/20/2022 31.0 (A) 37.0 - 50.0 % Final    MCV 03/20/2022 79.9 (A) 80.0 - 98.0 fL Final    MCH 03/20/2022 25.0 (A) 25.4 - 34.6 pg Final    MCHC 03/20/2022 31.3  30.0 - 36.0 gm/dl Final    PLATELET 99/31/0630 178  140 - 450 1000/mm3 Final    MPV 03/20/2022 9.4  6.0 - 10.0 fL Final    RDW-SD 03/20/2022 47.5 (A) 36.4 - 46.3   Final    NRBC 03/20/2022 0  0 - 0   Final    IMMATURE GRANULOCYTES 03/20/2022 0.2  0.0 - 3.0 % Final    Comment: IG - Immature granulocytes (promyelocytes + myelocytes + metamyelocytes), their presence  indicates a left shift. An IG > 3% may predict positive blood cultures with 98% specificity.  (P<0.04) and 92% Positive Predictive Value (Cady)1. Increased immature granulocytes  assist with the detection of infection and/or inflammation and may be present at an early stage  and are more sensitive and specific than band counts.         NEUTROPHILS 03/20/2022 51.2  34 - 64 % Final    LYMPHOCYTES 03/20/2022 38.4  28 - 48 % Final    MONOCYTES 03/20/2022 8.8  1 - 13 % Final    EOSINOPHILS 03/20/2022 1.1  0 - 5 % Final    BASOPHILS 03/20/2022 0.3  0 - 3 % Final    Epithelial cells, squamous 03/20/2022 >50  /LPF Final    WBC 03/20/2022 1-4  /HPF Final    Bacteria 03/20/2022 3+  /HPF Final    Sodium 03/21/2022 134 (A) 136 - 145 mEq/L Final    Potassium 03/21/2022 3.5  3.5 - 5.1 mEq/L Final    Chloride 03/21/2022 102  98 - 107 mEq/L Final    CO2 03/21/2022 26  21 - 32 mEq/L Final    Glucose 03/21/2022 93  74 - 106 mg/dl Final    BUN 03/21/2022 9  7 - 25 mg/dl Final    Creatinine 03/21/2022 1.0  0.6 - 1.3 mg/dl Final    GFR est AA 03/21/2022 >60.0    Final    Comment: THE [facility] [employer] [employer] GROUP STATES THAT THE MDRD STUDY EQUATION SHOULD ONLY BE USED ON  INDIVIDUALS 18 OR OLDER. THE REPORT ALSO NOTES THAT THE MDRD STUDY EQUATION HAS NOT BEEN  VALIDATED FOR USE WITH THE ELDERLY (OVER 79YEARS OF AGE), PREGNANT WOMEN, PATIENTS WITH SERIOUS  COMORBID CONDITIONS, OR PERSONS WITH EXTREMES OF BODY SIZE, MUSCLE MASS, OR NUTRITIONAL STATUS. APPLICATION OF THE EQUATION TO THESE PATIENT GROUPS MAY LEAD TO ERRORS IN GFR ESTIMATION. GFR  ESTIMATING EQUATIONS HAVE POORER AGREEMENT WITH MEASURED GFR FOR ILL HOSPITALIZED PATIENTS AND  FOR PEOPLE WITH NEAR NORMAL KIDNEY FUNCTION THAN FOR SUBJECTS IN THE MDRD STUDY. VALIDATION  STUDIES ARE IN PROGRESS TO EVALUATE THE MDRD STUDY EQUATION FOR ADDITIONAL ETHNIC GROUPS, THE  ELDERLY, VARIOUS DISEASE CONDITIONS, AND PEOPLE WITH NORMAL KIDNEY FUNCTION. GFRA----REFERS TO   GFRO---REFERS TO OTHER RACES    REFERENCES AVAILABLE UPON REQUEST. GFR est non-AA 03/21/2022 >60    Final    Calcium 03/21/2022 8.6  8.5 - 10.1 mg/dl Final    AST (SGOT) 03/21/2022 29  15 - 37 U/L Final    ALT (SGPT) 03/21/2022 19  12 - 78 U/L Final    Alk.  phosphatase 03/21/2022 88  45 - 117 U/L Final    Bilirubin, total 03/21/2022 0.7  0.2 - 1.0 mg/dl Final    Protein, total 03/21/2022 9.6 (A) 6.4 - 8.2 gm/dl Final    Albumin 03/21/2022 3.4  3.4 - 5.0 gm/dl Final    Anion gap 03/21/2022 6  5 - 15 mmol/L Final    Magnesium 03/21/2022 2.3  1.6 - 2.6 mg/dl Final    WBC 03/21/2022 7.3  4.0 - 11.0 1000/mm3 Final RBC 03/21/2022 4.11  3.60 - 5.20 M/uL Final    HGB 03/21/2022 10.4 (A) 13.0 - 17.2 gm/dl Final    HCT 03/21/2022 33.1 (A) 37.0 - 50.0 % Final    MCV 03/21/2022 80.5  80.0 - 98.0 fL Final    MCH 03/21/2022 25.3 (A) 25.4 - 34.6 pg Final    MCHC 03/21/2022 31.4  30.0 - 36.0 gm/dl Final    PLATELET 99/54/0281 693  140 - 450 1000/mm3 Final    MPV 03/21/2022 9.0  6.0 - 10.0 fL Final    RDW-SD 03/21/2022 47.5 (A) 36.4 - 46.3   Final    NRBC 03/21/2022 0  0 - 0   Final    IMMATURE GRANULOCYTES 03/21/2022 0.3  0.0 - 3.0 % Final    Comment: IG - Immature granulocytes (promyelocytes + myelocytes + metamyelocytes), their presence  indicates a left shift. An IG > 3% may predict positive blood cultures with 98% specificity.  (P<0.04) and 92% Positive Predictive Value (Tae-Mattie)1. Increased immature granulocytes  assist with the detection of infection and/or inflammation and may be present at an early stage  and are more sensitive and specific than band counts. NEUTROPHILS 03/21/2022 45.8  34 - 64 % Final    LYMPHOCYTES 03/21/2022 41.9  28 - 48 % Final    MONOCYTES 03/21/2022 9.8  1 - 13 % Final    EOSINOPHILS 03/21/2022 1.9  0 - 5 % Final    BASOPHILS 03/21/2022 0.3  0 - 3 % Final       No results found for any visits on 08/10/22. Patient Care Team:  Patient Care Team:  Meri Ferguson NP as PCP - General (Nurse Practitioner)      Assessment / Plan:      ICD-10-CM ICD-9-CM    1. Essential hypertension  X90 493.7 METABOLIC PANEL, COMPREHENSIVE      amLODIPine (NORVASC) 10 mg tablet      lisinopriL (PRINIVIL, ZESTRIL) 20 mg tablet      2. Prediabetes  R73.03 790.29 HEMOGLOBIN A1C WITH EAG      3. Breast cancer screening by mammogram  Z12.31 V76.12 AHMET MAMMO BI SCREENING INCL CAD      4. Encounter for hepatitis C screening test for low risk patient  Z11.59 V73.89 HEPATITIS C AB      5. [facility], [facility]  I69.359 438.20 aspirin delayed-release 81 mg tablet      6.  Seizure ([facility]  R56.9 780.39 levETIRAcetam (KEPPRA) 500 mg tablet      7. Chronic pain of toe, right  M79.674 729.5     G89.29 338.29       8. Contracture of hand joint, right  M24.541 718.44       9. Mixed hyperlipidemia  E78.2 272.2 LIPID PANEL      atorvastatin (LIPITOR) 40 mg tablet      10. Anemia, unspecified type  D64.9 285.9 CBC WITH AUTOMATED DIFF      FERRITIN      IRON PROFILE        Labs ordered. HTN: BP is at goal.  Continue amlodipine and lisinopril. History of CVA: Continue aspirin and statin. Check lipid panel. Prediabetes: Check HbA1c. Right foot pain: Patient was previously seen by podiatry. Will request notes. Tachycardia: Patient noted to be tachycardic today. Denies any palpitations. She does have a prior history of anemia. Check CBC, iron profile and ferritin. Hiatal hernia: Continue PPI. History of seizures: No recent seizure. Continue Keppra. Mammogram ordered. Follow-up and Dispositions    Return in about 3 months (around 11/10/2022). I asked the patient if she  had any questions and answered her  questions. The patient stated that she understands the treatment plan and agrees with the treatment plan    This document was created with a voice activated dictation system and may contain transcription errors.

## 2022-08-11 ENCOUNTER — TELEPHONE (OUTPATIENT)
Dept: FAMILY MEDICINE CLINIC | Age: 57
End: 2022-08-11

## 2022-08-11 DIAGNOSIS — E87.6 HYPOKALEMIA: Primary | ICD-10-CM

## 2022-08-11 DIAGNOSIS — D50.9 IRON DEFICIENCY ANEMIA, UNSPECIFIED IRON DEFICIENCY ANEMIA TYPE: ICD-10-CM

## 2022-08-11 LAB
HCV AB SER IA-ACNC: 0.1 INDEX
HCV AB SERPL QL IA: NEGATIVE
HCV COMMENT,HCGAC: NORMAL

## 2022-08-11 RX ORDER — FERROUS SULFATE 325(65) MG
325 TABLET, DELAYED RELEASE (ENTERIC COATED) ORAL DAILY
Qty: 30 TABLET | Refills: 2 | Status: SHIPPED | OUTPATIENT
Start: 2022-08-11

## 2022-08-11 RX ORDER — POTASSIUM CHLORIDE 20 MEQ/1
20 TABLET, EXTENDED RELEASE ORAL DAILY
Qty: 5 TABLET | Refills: 0 | Status: SHIPPED | OUTPATIENT
Start: 2022-08-11

## 2022-08-11 NOTE — TELEPHONE ENCOUNTER
Called the patient. No answer. Unable to leave voicemail. Labs reviewed. Will prescribe potassium supplements. Patient also noted to have iron deficiency anemia. FOBT in 12/21 was positive. Please inquire if patient has had a colonoscopy?

## 2022-08-12 NOTE — TELEPHONE ENCOUNTER
Pt was given lab results and made aware of new medication. Pt states she is unsure if she had coloscopy.

## 2022-08-24 ENCOUNTER — TELEPHONE (OUTPATIENT)
Dept: FAMILY MEDICINE CLINIC | Age: 57
End: 2022-08-24

## 2022-08-24 ENCOUNTER — HOSPITAL ENCOUNTER (OUTPATIENT)
Dept: MAMMOGRAPHY | Age: 57
Discharge: HOME | End: 2022-08-24
Attending: STUDENT IN AN ORGANIZED HEALTH CARE EDUCATION/TRAINING PROGRAM
Payer: MEDICAID

## 2022-08-24 ENCOUNTER — HOSPITAL ENCOUNTER (OUTPATIENT)
Dept: ULTRASOUND IMAGING | Age: 57
Discharge: HOME | End: 2022-08-24
Attending: STUDENT IN AN ORGANIZED HEALTH CARE EDUCATION/TRAINING PROGRAM
Payer: MEDICAID

## 2022-08-24 DIAGNOSIS — N63.0 BREAST MASS IN FEMALE: ICD-10-CM

## 2022-08-24 DIAGNOSIS — N63.0 BREAST MASS IN FEMALE: Primary | ICD-10-CM

## 2022-08-24 DIAGNOSIS — Z12.31 BREAST CANCER SCREENING BY MAMMOGRAM: ICD-10-CM

## 2022-08-24 PROCEDURE — 77062 BREAST TOMOSYNTHESIS BI: CPT

## 2022-08-24 PROCEDURE — 76642 ULTRASOUND BREAST LIMITED: CPT

## 2022-09-20 ENCOUNTER — HOSPITAL ENCOUNTER (OUTPATIENT)
Dept: ULTRASOUND IMAGING | Age: 57
Discharge: HOME | End: 2022-09-20
Attending: STUDENT IN AN ORGANIZED HEALTH CARE EDUCATION/TRAINING PROGRAM
Payer: COMMERCIAL

## 2022-09-20 ENCOUNTER — HOSPITAL ENCOUNTER (OUTPATIENT)
Dept: MAMMOGRAPHY | Age: 57
Discharge: HOME | End: 2022-09-20
Attending: STUDENT IN AN ORGANIZED HEALTH CARE EDUCATION/TRAINING PROGRAM
Payer: COMMERCIAL

## 2022-09-20 DIAGNOSIS — R59.0 AXILLARY LYMPHADENOPATHY: ICD-10-CM

## 2022-09-20 DIAGNOSIS — R92.8 ABNORMAL MAMMOGRAM: ICD-10-CM

## 2022-09-20 PROCEDURE — 88184 FLOWCYTOMETRY/ TC 1 MARKER: CPT

## 2022-09-20 PROCEDURE — 88185 FLOWCYTOMETRY/TC ADD-ON: CPT

## 2022-09-20 PROCEDURE — 74011000250 HC RX REV CODE- 250: Performed by: STUDENT IN AN ORGANIZED HEALTH CARE EDUCATION/TRAINING PROGRAM

## 2022-09-20 PROCEDURE — 77065 DX MAMMO INCL CAD UNI: CPT

## 2022-09-20 PROCEDURE — 19083 BX BREAST 1ST LESION US IMAG: CPT

## 2022-09-20 PROCEDURE — 88305 TISSUE EXAM BY PATHOLOGIST: CPT

## 2022-09-20 RX ORDER — LIDOCAINE HYDROCHLORIDE AND EPINEPHRINE 10; 10 MG/ML; UG/ML
1.5 INJECTION, SOLUTION INFILTRATION; PERINEURAL
Status: COMPLETED | OUTPATIENT
Start: 2022-09-20 | End: 2022-09-20

## 2022-09-20 RX ORDER — LIDOCAINE HYDROCHLORIDE 10 MG/ML
5 INJECTION, SOLUTION EPIDURAL; INFILTRATION; INTRACAUDAL; PERINEURAL
Status: COMPLETED | OUTPATIENT
Start: 2022-09-20 | End: 2022-09-20

## 2022-09-20 RX ADMIN — LIDOCAINE HYDROCHLORIDE AND EPINEPHRINE 15 MG: 10; 10 INJECTION, SOLUTION INFILTRATION; PERINEURAL at 13:52

## 2022-09-20 RX ADMIN — LIDOCAINE HYDROCHLORIDE 5 ML: 10 INJECTION, SOLUTION EPIDURAL; INFILTRATION; INTRACAUDAL; PERINEURAL at 13:51

## 2022-09-23 ENCOUNTER — TELEPHONE (OUTPATIENT)
Dept: MAMMOGRAPHY | Age: 57
End: 2022-09-23

## 2022-09-23 NOTE — TELEPHONE ENCOUNTER
Called Ms. Lynda Chowdhury to inform her of benign breast biopsy results. Explained pathology findings and recommendation for follow up routine screening mammogram in 12 months. She expressed understanding and her results [facility].

## 2022-09-26 ENCOUNTER — HOSPITAL ENCOUNTER (EMERGENCY)
Age: 57
Discharge: HOME | End: 2022-09-26
Attending: STUDENT IN AN ORGANIZED HEALTH CARE EDUCATION/TRAINING PROGRAM
Payer: MEDICAID

## 2022-09-26 ENCOUNTER — APPOINTMENT (OUTPATIENT)
Dept: GENERAL RADIOLOGY | Age: 57
End: 2022-09-26
Attending: STUDENT IN AN ORGANIZED HEALTH CARE EDUCATION/TRAINING PROGRAM
Payer: MEDICAID

## 2022-09-26 VITALS
TEMPERATURE: 98.2 F | SYSTOLIC BLOOD PRESSURE: 113 MMHG | OXYGEN SATURATION: 98 % | DIASTOLIC BLOOD PRESSURE: 72 MMHG | HEART RATE: 96 BPM | RESPIRATION RATE: 20 BRPM

## 2022-09-26 DIAGNOSIS — K21.9 GASTROESOPHAGEAL REFLUX DISEASE WITHOUT ESOPHAGITIS: Primary | ICD-10-CM

## 2022-09-26 LAB
ALBUMIN SERPL-MCNC: 3.3 G/DL (ref 3.4–5)
ALBUMIN/GLOB SERPL: 0.6 {RATIO} (ref 0.8–1.7)
ALP SERPL-CCNC: 99 U/L (ref 45–117)
ALT SERPL-CCNC: 17 U/L (ref 13–56)
ANION GAP SERPL CALC-SCNC: 7 MMOL/L (ref 3–18)
AST SERPL-CCNC: 28 U/L (ref 10–38)
ATRIAL RATE: 116 BPM
BASOPHILS # BLD: 0 K/UL (ref 0–0.1)
BASOPHILS NFR BLD: 0 % (ref 0–2)
BILIRUB SERPL-MCNC: 0.6 MG/DL (ref 0.2–1)
BUN SERPL-MCNC: 4 MG/DL (ref 7–18)
BUN/CREAT SERPL: 6 (ref 12–20)
CALCIUM SERPL-MCNC: 8.9 MG/DL (ref 8.5–10.1)
CALCULATED P AXIS, ECG09: 61 DEGREES
CALCULATED R AXIS, ECG10: 28 DEGREES
CALCULATED T AXIS, ECG11: 12 DEGREES
CHLORIDE SERPL-SCNC: 106 MMOL/L (ref 100–111)
CO2 SERPL-SCNC: 25 MMOL/L (ref 21–32)
CREAT SERPL-MCNC: 0.71 MG/DL (ref 0.6–1.3)
DIAGNOSIS, 93000: NORMAL
DIFFERENTIAL METHOD BLD: ABNORMAL
EOSINOPHIL # BLD: 0.1 K/UL (ref 0–0.4)
EOSINOPHIL NFR BLD: 2 % (ref 0–5)
ERYTHROCYTE [DISTWIDTH] IN BLOOD BY AUTOMATED COUNT: 20.4 % (ref 11.6–14.5)
GLOBULIN SER CALC-MCNC: 5.9 G/DL (ref 2–4)
GLUCOSE SERPL-MCNC: 132 MG/DL (ref 74–99)
HCT VFR BLD AUTO: 36.9 % (ref 35–45)
HGB BLD-MCNC: 11.4 G/DL (ref 12–16)
IMM GRANULOCYTES # BLD AUTO: 0 K/UL (ref 0–0.04)
IMM GRANULOCYTES NFR BLD AUTO: 0 % (ref 0–0.5)
LIPASE SERPL-CCNC: 309 U/L (ref 73–393)
LYMPHOCYTES # BLD: 1.9 K/UL (ref 0.9–3.6)
LYMPHOCYTES NFR BLD: 25 % (ref 21–52)
MAGNESIUM SERPL-MCNC: 2 MG/DL (ref 1.6–2.6)
MCH RBC QN AUTO: 23.7 PG (ref 24–34)
MCHC RBC AUTO-ENTMCNC: 30.9 G/DL (ref 31–37)
MCV RBC AUTO: 76.7 FL (ref 78–100)
MONOCYTES # BLD: 0.6 K/UL (ref 0.05–1.2)
MONOCYTES NFR BLD: 9 % (ref 3–10)
NEUTS SEG # BLD: 4.9 K/UL (ref 1.8–8)
NEUTS SEG NFR BLD: 65 % (ref 40–73)
NRBC # BLD: 0 K/UL (ref 0–0.01)
NRBC BLD-RTO: 0 PER 100 WBC
P-R INTERVAL, ECG05: 174 MS
PLATELET # BLD AUTO: 336 K/UL (ref 135–420)
PMV BLD AUTO: 8.7 FL (ref 9.2–11.8)
POTASSIUM SERPL-SCNC: 3.6 MMOL/L (ref 3.5–5.5)
PROT SERPL-MCNC: 9.2 G/DL (ref 6.4–8.2)
Q-T INTERVAL, ECG07: 322 MS
QRS DURATION, ECG06: 66 MS
QTC CALCULATION (BEZET), ECG08: 447 MS
RBC # BLD AUTO: 4.81 M/UL (ref 4.2–5.3)
SODIUM SERPL-SCNC: 138 MMOL/L (ref 136–145)
TROPONIN-HIGH SENSITIVITY: 4 NG/L (ref 0–54)
VENTRICULAR RATE, ECG03: 116 BPM
WBC # BLD AUTO: 7.6 K/UL (ref 4.6–13.2)

## 2022-09-26 PROCEDURE — 99285 EMERGENCY DEPT VISIT HI MDM: CPT

## 2022-09-26 PROCEDURE — 80053 COMPREHEN METABOLIC PANEL: CPT

## 2022-09-26 PROCEDURE — 83690 ASSAY OF LIPASE: CPT

## 2022-09-26 PROCEDURE — 85025 COMPLETE CBC W/AUTO DIFF WBC: CPT

## 2022-09-26 PROCEDURE — 94762 N-INVAS EAR/PLS OXIMTRY CONT: CPT

## 2022-09-26 PROCEDURE — 93005 ELECTROCARDIOGRAM TRACING: CPT

## 2022-09-26 PROCEDURE — 71045 X-RAY EXAM CHEST 1 VIEW: CPT

## 2022-09-26 PROCEDURE — 83735 ASSAY OF MAGNESIUM: CPT

## 2022-09-26 PROCEDURE — 84484 ASSAY OF TROPONIN QUANT: CPT

## 2022-09-26 NOTE — ED PROVIDER NOTES
EMERGENCY DEPARTMENT HISTORY AND PHYSICAL EXAM    I have evaluated the patient at 1:47 PM      Date: 9/26/2022  Patient Name: Herminia Linares    History of Presenting Illness     No chief complaint on file. History Provided By: Patient  Location/Duration/Severity/Modifying factors   Patient is a 68-year-old female with history of prior stroke with right-sided residual weakness, hypertension, hyperlipidemia, seizure disorder, GERD presenting to the emergency department for evaluation of epigastric abdominal pain. Patient reports the episode starting this morning after eating some cereal approximately couple hours prior to arrival.  Reporting some sharp epigastric abdominal pain without radiation of the symptoms. Patient reports having the symptoms on and off for the past couple of years which is likely due to her GERD. She denies any nausea or vomiting. No chest pain or diarrhea, fevers or chills. No exacerbating or relieving factors. PCP: Paul Virgen MD    Current Facility-Administered Medications   Medication Dose Route Frequency Provider Last Rate Last Admin    mylanta/viscous lidocaine (GI COCKTAIL)  40 mL Oral ONCE [place], DO         Current Outpatient Medications   Medication Sig Dispense Refill    potassium chloride (K-DUR, KLOR-CON M20) 20 mEq tablet Take 1 Tablet by mouth in the morning. Indications: low amount of potassium in the blood 5 Tablet 0    ferrous sulfate (IRON) 325 mg (65 mg iron) EC tablet Take 1 Tablet by mouth in the morning. 30 Tablet 2    aspirin delayed-release 81 mg tablet Take 1 Tablet by mouth in the morning. 90 Tablet 1    atorvastatin (LIPITOR) 40 mg tablet Take 1 Tablet by mouth in the morning. 90 Tablet 1    amLODIPine (NORVASC) 10 mg tablet Take 1 Tablet by mouth in the morning. 90 Tablet 1    esomeprazole (NexIUM) 20 mg capsule Take 2 Capsules by mouth two (2) times a day.  60 Capsule 1    lisinopriL (PRINIVIL, ZESTRIL) 20 mg tablet Take 1 Tablet by mouth in the morning. 90 Tablet 1    levETIRAcetam (KEPPRA) 500 mg tablet TAKE 1 TABLET BY MOUTH TWO TIMES A DAY. 60 Tablet 4    clopidogreL (PLAVIX) 75 mg tab Take 1 Tablet by mouth daily. 30 Tablet 2    polyethylene glycol (Miralax) 17 gram/dose powder Take 17 g by mouth daily. 1 tablespoon with 8 oz of water daily 210 g 0       Past History     Past Medical History:  Past Medical History:   Diagnosis Date    CVA (cerebral vascular accident) ([address]     HLD (hyperlipidemia)     HTN (hypertension)     Right hemiparesis ([address]     Seizure ([facility]        Past Surgical History:  No past surgical history on file. Family History:  No family history on file. Social History:  Social History     Tobacco Use    Smoking status: Never    Smokeless tobacco: Never   Substance Use Topics    Alcohol use: Never    Drug use: Never       Allergies: Allergies   Allergen Reactions    Aggrenox [Aspirin-Dipyridamole] Swelling         Review of Systems       Review of Systems   Constitutional:  Negative for activity change, chills, diaphoresis, fatigue and fever. Respiratory:  Negative for cough, chest tightness, shortness of breath, wheezing and stridor. Cardiovascular:  Negative for chest pain and palpitations. Gastrointestinal:  Positive for abdominal pain (Epigastric). Negative for abdominal distention, constipation, diarrhea, nausea and vomiting. Genitourinary:  Negative for difficulty urinating, dysuria and hematuria. Musculoskeletal:  Negative for back pain, joint swelling and myalgias. Skin:  Negative for rash and wound. Neurological:  Negative for dizziness, weakness, light-headedness, numbness and headaches. Psychiatric/Behavioral:  Negative for agitation. The patient is not nervous/anxious. Physical Exam   There were no vitals taken for this visit. Physical Exam  Constitutional:       General: She is not in acute distress. Appearance: She is not toxic-appearing.    HENT:      Head: Normocephalic and atraumatic. Mouth/Throat:      Mouth: Mucous membranes are moist.   Eyes:      Extraocular Movements: Extraocular movements intact. Pupils: Pupils are equal, round, and reactive to light. Cardiovascular:      Rate and Rhythm: Normal rate and regular rhythm. Heart sounds: Normal heart sounds. No murmur heard. No friction rub. No gallop. Pulmonary:      Effort: Pulmonary effort is normal.      Breath sounds: Normal breath sounds. Abdominal:      General: There is no distension. Palpations: Abdomen is soft. There is no mass. Tenderness: There is abdominal tenderness (Mild tenderness to palpation in the epigastric region. ). There is no guarding. Hernia: No hernia is present. Musculoskeletal:         General: No swelling, tenderness or deformity. Cervical back: Normal range of motion and neck supple. Skin:     General: Skin is warm and dry. Findings: No rash. Neurological:      General: No focal deficit present. Mental Status: She is alert and oriented to person, place, and time.    Psychiatric:         Mood and Affect: Mood normal.         Diagnostic Study Results     Labs -  Recent Results (from the past 12 hour(s))   EKG, 12 LEAD, INITIAL    Collection Time: 09/26/22  2:04 PM   Result Value Ref Range    Ventricular Rate 116 BPM    Atrial Rate 116 BPM    P-R Interval 174 ms    QRS Duration 66 ms    Q-T Interval 322 ms    QTC Calculation (Bezet) 447 ms    Calculated P Axis 61 degrees    Calculated R Axis 28 degrees    Calculated T Axis 12 degrees    Diagnosis       Sinus tachycardia  Nonspecific T wave abnormality  Abnormal ECG  When compared with ECG of 02-DEC-2021 17:33,  Nonspecific T wave abnormality, worse in Inferior leads  Confirmed by Marichuy Tyler MD, Kathy Corrales ([id number]) on 9/26/2022 3:35:53 PM     CBC WITH AUTOMATED DIFF    Collection Time: 09/26/22  2:25 PM   Result Value Ref Range    WBC 7.6 4.6 - 13.2 K/uL    RBC 4.81 4.20 - 5.30 M/uL    HGB 11.4 (L) 12.0 - 16.0 g/dL    HCT 36.9 35.0 - 45.0 %    MCV 76.7 (L) 78.0 - 100.0 FL    MCH 23.7 (L) 24.0 - 34.0 PG    MCHC 30.9 (L) 31.0 - 37.0 g/dL    RDW 20.4 (H) 11.6 - 14.5 %    PLATELET 327 040 - 112 K/uL    MPV 8.7 (L) 9.2 - 11.8 FL    NRBC 0.0 0  WBC    ABSOLUTE NRBC 0.00 0.00 - 0.01 K/uL    NEUTROPHILS 65 40 - 73 %    LYMPHOCYTES 25 21 - 52 %    MONOCYTES 9 3 - 10 %    EOSINOPHILS 2 0 - 5 %    BASOPHILS 0 0 - 2 %    IMMATURE GRANULOCYTES 0 0.0 - 0.5 %    ABS. NEUTROPHILS 4.9 1.8 - 8.0 K/UL    ABS. LYMPHOCYTES 1.9 0.9 - 3.6 K/UL    ABS. MONOCYTES 0.6 0.05 - 1.2 K/UL    ABS. EOSINOPHILS 0.1 0.0 - 0.4 K/UL    ABS. BASOPHILS 0.0 0.0 - 0.1 K/UL    ABS. IMM. GRANS. 0.0 0.00 - 0.04 K/UL    DF AUTOMATED     MAGNESIUM    Collection Time: 09/26/22  2:25 PM   Result Value Ref Range    Magnesium 2.0 1.6 - 2.6 mg/dL   TROPONIN-HIGH SENSITIVITY    Collection Time: 09/26/22  2:25 PM   Result Value Ref Range    Troponin-High Sensitivity 4 0 - 54 ng/L   METABOLIC PANEL, COMPREHENSIVE    Collection Time: 09/26/22  2:25 PM   Result Value Ref Range    Sodium 138 136 - 145 mmol/L    Potassium 3.6 3.5 - 5.5 mmol/L    Chloride 106 100 - 111 mmol/L    CO2 25 21 - 32 mmol/L    Anion gap 7 3.0 - 18 mmol/L    Glucose 132 (H) 74 - 99 mg/dL    BUN 4 (L) 7.0 - 18 MG/DL    Creatinine 0.71 0.6 - 1.3 MG/DL    BUN/Creatinine ratio 6 (L) 12 - 20      GFR est AA >60 >60 ml/min/1.73m2    GFR est non-AA >60 >60 ml/min/1.73m2    Calcium 8.9 8.5 - 10.1 MG/DL    Bilirubin, total 0.6 0.2 - 1.0 MG/DL    ALT (SGPT) 17 13 - 56 U/L    AST (SGOT) 28 10 - 38 U/L    Alk. phosphatase 99 45 - 117 U/L    Protein, total 9.2 (H) 6.4 - 8.2 g/dL    Albumin 3.3 (L) 3.4 - 5.0 g/dL    Globulin 5.9 (H) 2.0 - 4.0 g/dL    A-G Ratio 0.6 (L) 0.8 - 1.7     LIPASE    Collection Time: 09/26/22  2:25 PM   Result Value Ref Range    Lipase 309 73 - 393 U/L       Radiologic Studies -   XR CHEST PORT   Final Result   No acute pulmonary disease. Suspect hiatal hernia. Medical Decision Making   I am the first provider for this patient. I reviewed the vital signs, available nursing notes, past medical history, past surgical history, family history and social history. Vital Signs-Reviewed the patient's vital signs. EKG: Sinus tachycardia 116 bpm.  No ST elevation or depression. Nondiagnostic EKG. Records Reviewed: Nursing Notes, Old Medical Records, Previous Radiology Studies, and Previous Laboratory Studies (Time of Review: 1:47 PM)    ED Course: Progress Notes, Reevaluation, and Consults:         Provider Notes (Medical Decision Making):   MDM  Number of Diagnoses or Management Options  Gastroesophageal reflux disease without esophagitis  Diagnosis management comments: 58-year-old female presenting to the emergency department for evaluation of epigastric abdominal pain. Likely due to her GERD. She appears in no acute distress. Vital signs stable. Benign physical exam other than for mild tenderness to palpation of the epigastric region. Will obtain screening lab work, lipase, troponin. EKG shows no evidence of ischemia. Will administer GI cocktail and reassess. 1549:  Remarkable blood work. Troponin negative. Patient feeling better after GI cocktail. She is on Nexium at home. We will discharge her home at this time to follow-up with her primary care doctor. Instructed her to continue Nexium as prescribed. Discussed her on diet restrictions and bland diet. Return precautions advised. Patient verbalizes understanding and agreement with plan. Stable for discharge                 Procedures    Critical Care Time:       Diagnosis     Clinical Impression:   1.  Gastroesophageal reflux disease without esophagitis        Disposition: discharged    Follow-up Information       Follow up With Specialties Details Why [address] [facility] EMERGENCY DEPT Emergency Medicine  As needed, If symptoms worsen [address] [address]  [phone]    Kai Montesinos MD Internal Medicine Physician Call in 1 day  [address] [phone]               Patient's Medications   Start Taking    No medications on file   Continue Taking    AMLODIPINE (NORVASC) 10 MG TABLET    Take 1 Tablet by mouth in the morning. ASPIRIN DELAYED-RELEASE 81 MG TABLET    Take 1 Tablet by mouth in the morning. ATORVASTATIN (LIPITOR) 40 MG TABLET    Take 1 Tablet by mouth in the morning. CLOPIDOGREL (PLAVIX) 75 MG TAB    Take 1 Tablet by mouth daily. ESOMEPRAZOLE (NEXIUM) 20 MG CAPSULE    Take 2 Capsules by mouth two (2) times a day. FERROUS SULFATE (IRON) 325 MG (65 MG IRON) EC TABLET    Take 1 Tablet by mouth in the morning. LEVETIRACETAM (KEPPRA) 500 MG TABLET    TAKE 1 TABLET BY MOUTH TWO TIMES A DAY. LISINOPRIL (PRINIVIL, ZESTRIL) 20 MG TABLET    Take 1 Tablet by mouth in the morning. POLYETHYLENE GLYCOL (MIRALAX) 17 GRAM/DOSE POWDER    Take 17 g by mouth daily. 1 tablespoon with 8 oz of water daily    POTASSIUM CHLORIDE (K-DUR, KLOR-CON M20) 20 MEQ TABLET    Take 1 Tablet by mouth in the morning. Indications: low amount of potassium in the blood   These Medications have changed    No medications on file   Stop Taking    No medications on file     Disclaimer: Sections of this note are dictated using utilizing voice recognition software. Minor typographical errors may be present. If questions arise, please do not hesitate to contact me or call our department.

## 2022-09-26 NOTE — DISCHARGE INSTRUCTIONS
Followup with your primary care doctor. Continue taking your prescribed medications. Return to the emergency department for any new or worsening symptoms.

## 2022-11-09 ENCOUNTER — OFFICE VISIT (OUTPATIENT)
Dept: FAMILY MEDICINE CLINIC | Age: 57
End: 2022-11-09
Payer: COMMERCIAL

## 2022-11-09 VITALS
HEIGHT: 66 IN | RESPIRATION RATE: 17 BRPM | SYSTOLIC BLOOD PRESSURE: 110 MMHG | BODY MASS INDEX: 26.68 KG/M2 | HEART RATE: 80 BPM | OXYGEN SATURATION: 97 % | WEIGHT: 166 LBS | DIASTOLIC BLOOD PRESSURE: 83 MMHG

## 2022-11-09 DIAGNOSIS — K21.9 GASTROESOPHAGEAL REFLUX DISEASE, UNSPECIFIED WHETHER ESOPHAGITIS PRESENT: Primary | ICD-10-CM

## 2022-11-09 DIAGNOSIS — R19.5 POSITIVE FIT (FECAL IMMUNOCHEMICAL TEST): ICD-10-CM

## 2022-11-09 DIAGNOSIS — Z12.11 SCREENING FOR COLON CANCER: ICD-10-CM

## 2022-11-09 DIAGNOSIS — R56.9 SEIZURE (HCC): ICD-10-CM

## 2022-11-09 DIAGNOSIS — D50.9 IRON DEFICIENCY ANEMIA, UNSPECIFIED IRON DEFICIENCY ANEMIA TYPE: ICD-10-CM

## 2022-11-09 DIAGNOSIS — I10 ESSENTIAL HYPERTENSION: ICD-10-CM

## 2022-11-09 DIAGNOSIS — M79.671 RIGHT FOOT PAIN: ICD-10-CM

## 2022-11-09 PROCEDURE — 3078F DIAST BP <80 MM HG: CPT | Performed by: STUDENT IN AN ORGANIZED HEALTH CARE EDUCATION/TRAINING PROGRAM

## 2022-11-09 PROCEDURE — 3074F SYST BP LT 130 MM HG: CPT | Performed by: STUDENT IN AN ORGANIZED HEALTH CARE EDUCATION/TRAINING PROGRAM

## 2022-11-09 PROCEDURE — 99214 OFFICE O/P EST MOD 30 MIN: CPT | Performed by: STUDENT IN AN ORGANIZED HEALTH CARE EDUCATION/TRAINING PROGRAM

## 2022-11-09 RX ORDER — FERROUS SULFATE 325(65) MG
325 TABLET, DELAYED RELEASE (ENTERIC COATED) ORAL DAILY
Qty: 30 TABLET | Refills: 2 | Status: SHIPPED | OUTPATIENT
Start: 2022-11-09

## 2022-11-09 NOTE — PROGRESS NOTES
Cary Lomas is a 59 y.o. female presenting today for Follow Up Chronic Condition (Still having pain on bottom of right foot. Has been getting chocked up on food when eating and is causing her to throw up. Was seen in ED on 9/26 for this issue but states nothing was done. Has lump in left axillary area, had mammogram done and had it drained. But is still bothering her. Does not want Flu vaccine. )  . Chief Complaint   Patient presents with    Follow Up Chronic Condition     Still having pain on bottom of right foot. Has been getting chocked up on food when eating and is causing her to throw up. Was seen in ED on 9/26 for this issue but states nothing was done. Has lump in left axillary area, had mammogram done and had it drained. But is still bothering her. Does not want Flu vaccine. HPI:  Cary Lomas presents to the office today for follow up. Patient has a past medical history of HTN, CVA, HLD, anemia. HTN : She is prescribed amlodipine and lisinopril. Denies any chest pain, palpitations, headaches or dizziness. CVA: She had a stroke in 2011. She has Rt sided residual deficit. Seizures: Patient is taking Keppra. No recent seizures. Hiatal hernia: Patient was noted to have anemia and hiatal hernia. Taking a PPI. She presented to ED in 9/22 for epigastric pain. Noted to have positive FIT in 12/21. She has burning epigastric pain despite taking PPI. No difficulty swallowing. Denies any dark or bloody stool. Denies having a colonoscopy. States she may have had an EGD in the past but is unsure. Today, she is complaining of pain in her Rt foot. She has numbness/tingling in her Rt side. Has previously had surgery of her right foot. Currently not following with podiatry. Patient has multiple healthcare gaps. Review of Systems   Constitutional:  Negative for chills, diaphoresis, fever, malaise/fatigue and weight loss.    HENT:  Negative for congestion, ear discharge, ear pain, hearing loss, nosebleeds, sinus pain, sore throat and tinnitus. Eyes:  Negative for blurred vision, double vision and photophobia. Respiratory:  Negative for cough, sputum production, shortness of breath, wheezing and stridor. Cardiovascular:  Negative for chest pain, palpitations, orthopnea, claudication and leg swelling. Gastrointestinal:  Negative for abdominal pain, constipation, diarrhea, heartburn, nausea and vomiting. Genitourinary:  Negative for dysuria, flank pain, frequency, hematuria and urgency. Musculoskeletal:  Positive for back pain and joint pain. Negative for myalgias and neck pain. Skin:  Negative for rash. Neurological:  Positive for tingling, sensory change, speech change, focal weakness and weakness. Negative for tremors, seizures and headaches. Psychiatric/Behavioral:  Negative for depression. The patient is not nervous/anxious. All other systems reviewed and are negative. Allergies   Allergen Reactions    Aggrenox [Aspirin-Dipyridamole] Swelling       PHQ Screening   3 most recent PHQ Screens 11/9/2022   Little interest or pleasure in doing things Not at all   Feeling down, depressed, irritable, or hopeless Not at all   Total Score PHQ 2 0       History  Past Medical History:   Diagnosis Date    CVA (cerebral vascular accident) ([facility]     HLD (hyperlipidemia)     HTN (hypertension)     Right hemiparesis ([facility]     Seizure ([facility]        History reviewed. No pertinent surgical history.     Social History     Socioeconomic History    Marital status: SINGLE     Spouse name: Not on file    Number of children: Not on file    Years of education: Not on file    Highest education level: Not on file   Occupational History    Not on file   Tobacco Use    Smoking status: Never    Smokeless tobacco: Never   Substance and Sexual Activity    Alcohol use: Never    Drug use: Never    Sexual activity: Not Currently   Other Topics Concern    Not on file   Social History Narrative Not on file     Social Determinants of Health     Financial Resource Strain: Not on file   Food Insecurity: Not on file   Transportation Needs: Not on file   Physical Activity: Not on file   Stress: Not on file   Social Connections: Not on file   Intimate Partner Violence: Not on file   Housing Stability: Not on file       Current Outpatient Medications   Medication Sig Dispense Refill    ferrous sulfate (IRON) 325 mg (65 mg iron) EC tablet Take 1 Tablet by mouth daily. 30 Tablet 2    aspirin delayed-release 81 mg tablet Take 1 Tablet by mouth in the morning. 90 Tablet 1    atorvastatin (LIPITOR) 40 mg tablet Take 1 Tablet by mouth in the morning. 90 Tablet 1    amLODIPine (NORVASC) 10 mg tablet Take 1 Tablet by mouth in the morning. 90 Tablet 1    esomeprazole (NexIUM) 20 mg capsule Take 2 Capsules by mouth two (2) times a day. 60 Capsule 1    lisinopriL (PRINIVIL, ZESTRIL) 20 mg tablet Take 1 Tablet by mouth in the morning. 90 Tablet 1    levETIRAcetam (KEPPRA) 500 mg tablet TAKE 1 TABLET BY MOUTH TWO TIMES A DAY. 60 Tablet 4    clopidogreL (PLAVIX) 75 mg tab Take 1 Tablet by mouth daily. 30 Tablet 2    polyethylene glycol (Miralax) 17 gram/dose powder Take 17 g by mouth daily. 1 tablespoon with 8 oz of water daily 210 g 0    potassium chloride (K-DUR, KLOR-CON M20) 20 mEq tablet Take 1 Tablet by mouth in the morning. Indications: low amount of potassium in the blood 5 Tablet 0         Vitals:    11/09/22 1042   BP: 110/83   Pulse: 80   Resp: 17   SpO2: 97%   Weight: 166 lb (75.3 kg)   Height: 5' 6\" (1.676 m)   PainSc:   0 - No pain       Physical Exam  Vitals and nursing note reviewed. Constitutional:       General: She is not in acute distress. Appearance: Normal appearance. She is not ill-appearing, toxic-appearing or diaphoretic. HENT:      Head: Normocephalic and atraumatic. Cardiovascular:      Rate and Rhythm: Regular rhythm. Tachycardia present. Pulses: Normal pulses. Heart sounds:  No murmur heard. Pulmonary:      Effort: Pulmonary effort is normal. No respiratory distress. Breath sounds: Normal breath sounds. No wheezing or rales. Musculoskeletal:      Cervical back: Normal range of motion. Right lower leg: No edema. Left lower leg: No edema. Comments: Right hand contracture. Right-sided weakness. Right foot with old surgical well-healed scar. Skin:     General: Skin is warm and dry. Coloration: Skin is not jaundiced or pale. Neurological:      General: No focal deficit present. Mental Status: She is alert and oriented to person, place, and time. Mental status is at baseline. Motor: Weakness present. Gait: Gait abnormal.      Comments: Using a walker to ambulate. Speech slightly slurred. Psychiatric:         Mood and Affect: Mood normal.         Behavior: Behavior normal.         Thought Content: Thought content normal.         Judgment: Judgment normal.       Admission on 09/26/2022, Discharged on 09/26/2022   Component Date Value Ref Range Status    WBC 09/26/2022 7.6  4.6 - 13.2 K/uL Final    RBC 09/26/2022 4.81  4.20 - 5.30 M/uL Final    HGB 09/26/2022 11.4 (A)  12.0 - 16.0 g/dL Final    HCT 09/26/2022 36.9  35.0 - 45.0 % Final    MCV 09/26/2022 76.7 (A)  78.0 - 100.0 FL Final    MCH 09/26/2022 23.7 (A)  24.0 - 34.0 PG Final    MCHC 09/26/2022 30.9 (A)  31.0 - 37.0 g/dL Final    RDW 09/26/2022 20.4 (A)  11.6 - 14.5 % Final    PLATELET 64/66/4618 315  135 - 420 K/uL Final    MPV 09/26/2022 8.7 (A)  9.2 - 11.8 FL Final    NRBC 09/26/2022 0.0  0  WBC Final    ABSOLUTE NRBC 09/26/2022 0.00  0.00 - 0.01 K/uL Final    NEUTROPHILS 09/26/2022 65  40 - 73 % Final    LYMPHOCYTES 09/26/2022 25  21 - 52 % Final    MONOCYTES 09/26/2022 9  3 - 10 % Final    EOSINOPHILS 09/26/2022 2  0 - 5 % Final    BASOPHILS 09/26/2022 0  0 - 2 % Final    IMMATURE GRANULOCYTES 09/26/2022 0  0.0 - 0.5 % Final    ABS.  NEUTROPHILS 09/26/2022 4.9  1.8 - 8.0 K/UL Final    ABS. LYMPHOCYTES 09/26/2022 1.9  0.9 - 3.6 K/UL Final    ABS. MONOCYTES 09/26/2022 0.6  0.05 - 1.2 K/UL Final    ABS. EOSINOPHILS 09/26/2022 0.1  0.0 - 0.4 K/UL Final    ABS. BASOPHILS 09/26/2022 0.0  0.0 - 0.1 K/UL Final    ABS. IMM. GRANS. 09/26/2022 0.0  0.00 - 0.04 K/UL Final    DF 09/26/2022 AUTOMATED    Final    Magnesium 09/26/2022 2.0  1.6 - 2.6 mg/dL Final    Troponin-High Sensitivity 09/26/2022 4  0 - 54 ng/L Final    Comment: A HS troponin value change of (+ or -) 50% or more below the 99th percentile, in a 1/2/3 hr interval represents a significant change. Clinical correcation is recommended. A HS troponin value change of (+ or -) 20% or above the 99th percentile, in a 1/2/3 hr interval represents a significant change. Clinical correlation is recommended.   99th Percentile:    Women:  0-54 ng/L                                                               Men:  0-78 ng/L      Ventricular Rate 09/26/2022 116  BPM Final    Atrial Rate 09/26/2022 116  BPM Final    P-R Interval 09/26/2022 174  ms Final    QRS Duration 09/26/2022 66  ms Final    Q-T Interval 09/26/2022 322  ms Final    QTC Calculation (Bezet) 09/26/2022 447  ms Final    Calculated P Axis 09/26/2022 61  degrees Final    Calculated R Axis 09/26/2022 28  degrees Final    Calculated T Axis 09/26/2022 12  degrees Final    Diagnosis 09/26/2022    Final                    Value:Sinus tachycardia  Nonspecific T wave abnormality  Abnormal ECG  When compared with ECG of 02-DEC-2021 17:33,  Nonspecific T wave abnormality, worse in Inferior leads  Confirmed by Margarita Schulte MD, [place] ([id number]) on 9/26/2022 3:35:53 PM      Sodium 09/26/2022 138  136 - 145 mmol/L Final    Potassium 09/26/2022 3.6  3.5 - 5.5 mmol/L Final    Chloride 09/26/2022 106  100 - 111 mmol/L Final    CO2 09/26/2022 25  21 - 32 mmol/L Final    Anion gap 09/26/2022 7  3.0 - 18 mmol/L Final    Glucose 09/26/2022 132 (A)  74 - 99 mg/dL Final    BUN 09/26/2022 4 (A)  7.0 - 18 MG/DL Final Creatinine 09/26/2022 0.71  0.6 - 1.3 MG/DL Final    BUN/Creatinine ratio 09/26/2022 6 (A)  12 - 20   Final    GFR est AA 09/26/2022 >60  >60 ml/min/1.73m2 Final    GFR est non-AA 09/26/2022 >60  >60 ml/min/1.73m2 Final    Comment: (NOTE)  Estimated GFR is calculated using the Modification of Diet in Renal   Disease (MDRD) Study equation, reported for both  Americans   (GFRAA) and non- Americans (GFRNA), and normalized to 1.73m2   body surface area. The physician must decide which value applies to   the patient. The MDRD study equation should only be used in   individuals age 25 or older. It has not been validated for the   following: pregnant women, patients with serious comorbid conditions,   or on certain medications, or persons with extremes of body size,   muscle mass, or nutritional status. Calcium 09/26/2022 8.9  8.5 - 10.1 MG/DL Final    Bilirubin, total 09/26/2022 0.6  0.2 - 1.0 MG/DL Final    ALT (SGPT) 09/26/2022 17  13 - 56 U/L Final    AST (SGOT) 09/26/2022 28  10 - 38 U/L Final    Alk.  phosphatase 09/26/2022 99  45 - 117 U/L Final    Protein, total 09/26/2022 9.2 (A)  6.4 - 8.2 g/dL Final    Albumin 09/26/2022 3.3 (A)  3.4 - 5.0 g/dL Final    Globulin 09/26/2022 5.9 (A)  2.0 - 4.0 g/dL Final    A-G Ratio 09/26/2022 0.6 (A)  0.8 - 1.7   Final    Lipase 09/26/2022 309  73 - 393 U/L Final   Hospital Outpatient Visit on 08/10/2022   Component Date Value Ref Range Status    WBC 08/10/2022 6.8  4.6 - 13.2 K/uL Final    RBC 08/10/2022 4.54  4.20 - 5.30 M/uL Final    HGB 08/10/2022 10.3 (A)  12.0 - 16.0 g/dL Final    HCT 08/10/2022 34.8 (A)  35.0 - 45.0 % Final    MCV 08/10/2022 76.7 (A)  78.0 - 100.0 FL Final    MCH 08/10/2022 22.7 (A)  24.0 - 34.0 PG Final    MCHC 08/10/2022 29.6 (A)  31.0 - 37.0 g/dL Final    RDW 08/10/2022 16.9 (A)  11.6 - 14.5 % Final    PLATELET 57/51/1401 254 (A)  135 - 420 K/uL Final    MPV 08/10/2022 9.3  9.2 - 11.8 FL Final    NRBC 08/10/2022 0.0  0  WBC Final    ABSOLUTE NRBC 08/10/2022 0.00  0.00 - 0.01 K/uL Final    NEUTROPHILS 08/10/2022 40  40 - 73 % Final    LYMPHOCYTES 08/10/2022 49  21 - 52 % Final    MONOCYTES 08/10/2022 9  3 - 10 % Final    EOSINOPHILS 08/10/2022 2  0 - 5 % Final    BASOPHILS 08/10/2022 0  0 - 2 % Final    IMMATURE GRANULOCYTES 08/10/2022 0  0.0 - 0.5 % Final    ABS. NEUTROPHILS 08/10/2022 2.7  1.8 - 8.0 K/UL Final    ABS. LYMPHOCYTES 08/10/2022 3.4  0.9 - 3.6 K/UL Final    ABS. MONOCYTES 08/10/2022 0.6  0.05 - 1.2 K/UL Final    ABS. EOSINOPHILS 08/10/2022 0.1  0.0 - 0.4 K/UL Final    ABS. BASOPHILS 08/10/2022 0.0  0.0 - 0.1 K/UL Final    ABS. IMM. GRANS. 08/10/2022 0.0  0.00 - 0.04 K/UL Final    DF 08/10/2022 AUTOMATED    Final    Ferritin 08/10/2022 14  8 - 388 NG/ML Final    Sodium 08/10/2022 138  136 - 145 mmol/L Final    Potassium 08/10/2022 3.3 (A)  3.5 - 5.5 mmol/L Final    Chloride 08/10/2022 105  100 - 111 mmol/L Final    CO2 08/10/2022 23  21 - 32 mmol/L Final    Anion gap 08/10/2022 10  3.0 - 18 mmol/L Final    Glucose 08/10/2022 129 (A)  74 - 99 mg/dL Final    BUN 08/10/2022 4 (A)  7.0 - 18 MG/DL Final    Creatinine 08/10/2022 0.81  0.6 - 1.3 MG/DL Final    BUN/Creatinine ratio 08/10/2022 5 (A)  12 - 20   Final    GFR est AA 08/10/2022 >60  >60 ml/min/1.73m2 Final    GFR est non-AA 08/10/2022 >60  >60 ml/min/1.73m2 Final    Comment: (NOTE)  Estimated GFR is calculated using the Modification of Diet in Renal   Disease (MDRD) Study equation, reported for both  Americans   (GFRAA) and non- Americans (GFRNA), and normalized to 1.73m2   body surface area. The physician must decide which value applies to   the patient. The MDRD study equation should only be used in   individuals age 25 or older. It has not been validated for the   following: pregnant women, patients with serious comorbid conditions,   or on certain medications, or persons with extremes of body size,   muscle mass, or nutritional status.       Calcium 08/10/2022 9.2  8.5 - 10.1 MG/DL Final    Bilirubin, total 08/10/2022 0.4  0.2 - 1.0 MG/DL Final    ALT (SGPT) 08/10/2022 17  13 - 56 U/L Final    AST (SGOT) 08/10/2022 32  10 - 38 U/L Final    Alk. phosphatase 08/10/2022 105  45 - 117 U/L Final    Protein, total 08/10/2022 9.8 (A)  6.4 - 8.2 g/dL Final    Albumin 08/10/2022 3.5  3.4 - 5.0 g/dL Final    Globulin 08/10/2022 6.3 (A)  2.0 - 4.0 g/dL Final    A-G Ratio 08/10/2022 0.6 (A)  0.8 - 1.7   Final    Hemoglobin A1c 08/10/2022 5.8 (A)  4.2 - 5.6 % Final    Comment: (NOTE)  HbA1C Interpretive Ranges  <5.7              Normal  5.7 - 6.4         Consider Prediabetes  >6.5              Consider Diabetes      Est. average glucose 08/10/2022 120  mg/dL Final    Comment: (NOTE)  The eAG should be interpreted with patient characteristics in mind   since ethnicity, interindividual differences, red cell lifespan,   variation in rates of glycation, etc. may affect the validity of the   calculation. LIPID PROFILE 08/10/2022        Final    Cholesterol, total 08/10/2022 88  <200 MG/DL Final    Triglyceride 08/10/2022 97  <150 MG/DL Final    Comment: The drugs N-acetylcysteine (NAC) and  Metamiszole have been found to cause falsely  low results in this chemical assay. Please  be sure to submit blood samples obtained  BEFORE administration of either of these  drugs to assure correct results. HDL Cholesterol 08/10/2022 35 (A)  40 - 60 MG/DL Final    LDL, calculated 08/10/2022 33.6  0 - 100 MG/DL Final    VLDL, calculated 08/10/2022 19.4  MG/DL Final    CHOL/HDL Ratio 08/10/2022 2.5  0 - 5.0   Final    Hepatitis C virus Ab 08/10/2022 0.1  <0.80 Index Final    Hep C virus Ab Interp. 08/10/2022 Negative  NEG   Final    Hep C  virus Ab comment 08/10/2022        Final    Comment: Index <0.80. ......................... Arlene Fanning Negative  Index > or = to 0.80 and <1.00. .... Arlene Fanning Arlene Fanning Equivocal  Index >1.00. ......................... Arlene Fanning Positive          For Equivocal or Positive results, confirmation with Hepatitis C RNA by PCR or bDNA is suggested. Iron 08/10/2022 28 (A)  50 - 175 ug/dL Final    Patients receiving metal-binding drugs (e.g. deferoxamine) may show spuriously depressed iron values, as chelated iron may not properly react in the iron assay. TIBC 08/10/2022 446  250 - 450 ug/dL Final    Iron % saturation 08/10/2022 6 (A)  20 - 50 % Final       No results found for any visits on 11/09/22. Patient Care Team:  Patient Care Team:  Иван Hancock MD as PCP - General (Internal Medicine Physician)  Иван Hancock MD as PCP - [facility] Provider      Assessment / Plan:      ICD-10-CM ICD-9-CM    1. Gastroesophageal reflux disease, unspecified whether esophagitis present  K21.9 530.81 REFERRAL TO GASTROENTEROLOGY      2. Iron deficiency anemia, unspecified iron deficiency anemia type  D50.9 280.9 ferrous sulfate (IRON) 325 mg (65 mg iron) EC tablet      REFERRAL TO GASTROENTEROLOGY      3. Right foot pain  M79.671 729.5 REFERRAL TO PODIATRY      4. Screening for colon cancer  Z12.11 V76.51 REFERRAL TO GASTROENTEROLOGY      5. Positive FIT (fecal immunochemical test)  R19.5 792.1 REFERRAL TO GASTROENTEROLOGY      6. Essential hypertension  I10 401.9       7. Seizure ([address]  R56.9 780.39         Labs from ED visit reviewed. HTN: BP is at goal.  Continue amlodipine and lisinopril. History of CVA: Continue aspirin and statin. LDL at goal.     Prediabetes:  HbA1c is 5.8%. Right foot pain: Refer to podiatry. Anemia with GERD: Noted to have positive FIT test in 12/21. Hgb is 11.4. Continue PPI. Epigastric pain appears to be worsening. Refer to GI. History of seizures: No recent seizure. Continue Keppra. Refused flu vaccine. Follow-up and Dispositions    Return in about 4 months (around 3/9/2023) for Pap Smear, 30 mins. I asked the patient if she  had any questions and answered her  questions.   The patient stated that she understands the treatment plan and agrees with the treatment plan    This document was created with a voice activated dictation system and may contain transcription errors.

## 2022-12-21 DIAGNOSIS — E78.2 MIXED HYPERLIPIDEMIA: ICD-10-CM

## 2022-12-21 RX ORDER — ATORVASTATIN CALCIUM 40 MG/1
40 TABLET, FILM COATED ORAL DAILY
Qty: 90 TABLET | Refills: 1 | Status: SHIPPED | OUTPATIENT
Start: 2022-12-21

## 2022-12-21 NOTE — TELEPHONE ENCOUNTER
Requested Prescriptions     Pending Prescriptions Disp Refills    atorvastatin (LIPITOR) 40 mg tablet 90 Tablet 1     Sig: Take 1 Tablet by mouth daily.

## 2023-01-17 DIAGNOSIS — I69.359 CVA, OLD, HEMIPARESIS (HCC): ICD-10-CM

## 2023-01-17 RX ORDER — ASPIRIN 81 MG/1
TABLET ORAL
Qty: 90 TABLET | Refills: 1 | Status: SHIPPED | OUTPATIENT
Start: 2023-01-17

## 2023-01-24 ENCOUNTER — APPOINTMENT (OUTPATIENT)
Dept: CT IMAGING | Age: 58
End: 2023-01-24
Attending: PHYSICIAN ASSISTANT
Payer: COMMERCIAL

## 2023-01-24 ENCOUNTER — HOSPITAL ENCOUNTER (EMERGENCY)
Age: 58
Discharge: HOME | End: 2023-01-24
Attending: EMERGENCY MEDICINE
Payer: COMMERCIAL

## 2023-01-24 VITALS
BODY MASS INDEX: 26.52 KG/M2 | HEART RATE: 91 BPM | SYSTOLIC BLOOD PRESSURE: 110 MMHG | RESPIRATION RATE: 16 BRPM | OXYGEN SATURATION: 99 % | HEIGHT: 66 IN | WEIGHT: 165 LBS | DIASTOLIC BLOOD PRESSURE: 84 MMHG | TEMPERATURE: 98 F

## 2023-01-24 DIAGNOSIS — R93.89 ABNORMAL CT SCAN: ICD-10-CM

## 2023-01-24 DIAGNOSIS — R10.84 ABDOMINAL PAIN, GENERALIZED: ICD-10-CM

## 2023-01-24 DIAGNOSIS — K59.00 CONSTIPATION, UNSPECIFIED CONSTIPATION TYPE: ICD-10-CM

## 2023-01-24 DIAGNOSIS — J18.9 COMMUNITY ACQUIRED PNEUMONIA, UNSPECIFIED LATERALITY: Primary | ICD-10-CM

## 2023-01-24 LAB
ALBUMIN SERPL-MCNC: 3.3 G/DL (ref 3.4–5)
ALBUMIN/GLOB SERPL: 0.5 (ref 0.8–1.7)
ALP SERPL-CCNC: 96 U/L (ref 45–117)
ALT SERPL-CCNC: 18 U/L (ref 13–56)
ANION GAP SERPL CALC-SCNC: 4 MMOL/L (ref 3–18)
AST SERPL-CCNC: 43 U/L (ref 10–38)
BASOPHILS # BLD: 0 K/UL (ref 0–0.1)
BASOPHILS NFR BLD: 0 % (ref 0–2)
BILIRUB SERPL-MCNC: 0.7 MG/DL (ref 0.2–1)
BUN SERPL-MCNC: 5 MG/DL (ref 7–18)
BUN/CREAT SERPL: 6 (ref 12–20)
CALCIUM SERPL-MCNC: 8.9 MG/DL (ref 8.5–10.1)
CHLORIDE SERPL-SCNC: 104 MMOL/L (ref 100–111)
CO2 SERPL-SCNC: 26 MMOL/L (ref 21–32)
CREAT SERPL-MCNC: 0.8 MG/DL (ref 0.6–1.3)
DIFFERENTIAL METHOD BLD: ABNORMAL
EOSINOPHIL # BLD: 0.1 K/UL (ref 0–0.4)
EOSINOPHIL NFR BLD: 2 % (ref 0–5)
ERYTHROCYTE [DISTWIDTH] IN BLOOD BY AUTOMATED COUNT: 15.5 % (ref 11.6–14.5)
GLOBULIN SER CALC-MCNC: 6.8 G/DL (ref 2–4)
GLUCOSE SERPL-MCNC: 89 MG/DL (ref 74–99)
HCG SERPL QL: NEGATIVE
HCT VFR BLD AUTO: 37.6 % (ref 35–45)
HGB BLD-MCNC: 12.2 G/DL (ref 12–16)
IMM GRANULOCYTES # BLD AUTO: 0 K/UL (ref 0–0.04)
IMM GRANULOCYTES NFR BLD AUTO: 0 % (ref 0–0.5)
LIPASE SERPL-CCNC: 395 U/L (ref 73–393)
LYMPHOCYTES # BLD: 2.6 K/UL (ref 0.9–3.6)
LYMPHOCYTES NFR BLD: 38 % (ref 21–52)
MAGNESIUM SERPL-MCNC: 2.2 MG/DL (ref 1.6–2.6)
MCH RBC QN AUTO: 26.2 PG (ref 24–34)
MCHC RBC AUTO-ENTMCNC: 32.4 G/DL (ref 31–37)
MCV RBC AUTO: 80.7 FL (ref 78–100)
MONOCYTES # BLD: 0.5 K/UL (ref 0.05–1.2)
MONOCYTES NFR BLD: 7 % (ref 3–10)
NEUTS SEG # BLD: 3.5 K/UL (ref 1.8–8)
NEUTS SEG NFR BLD: 52 % (ref 40–73)
NRBC # BLD: 0 K/UL (ref 0–0.01)
NRBC BLD-RTO: 0 PER 100 WBC
PLATELET # BLD AUTO: 254 K/UL (ref 135–420)
PMV BLD AUTO: 9.7 FL (ref 9.2–11.8)
POTASSIUM SERPL-SCNC: 4.4 MMOL/L (ref 3.5–5.5)
PROT SERPL-MCNC: 10.1 G/DL (ref 6.4–8.2)
RBC # BLD AUTO: 4.66 M/UL (ref 4.2–5.3)
SODIUM SERPL-SCNC: 134 MMOL/L (ref 136–145)
WBC # BLD AUTO: 6.8 K/UL (ref 4.6–13.2)

## 2023-01-24 PROCEDURE — 74177 CT ABD & PELVIS W/CONTRAST: CPT

## 2023-01-24 PROCEDURE — 85025 COMPLETE CBC W/AUTO DIFF WBC: CPT

## 2023-01-24 PROCEDURE — 83690 ASSAY OF LIPASE: CPT

## 2023-01-24 PROCEDURE — 96374 THER/PROPH/DIAG INJ IV PUSH: CPT

## 2023-01-24 PROCEDURE — 80053 COMPREHEN METABOLIC PANEL: CPT

## 2023-01-24 PROCEDURE — 83735 ASSAY OF MAGNESIUM: CPT

## 2023-01-24 PROCEDURE — 84703 CHORIONIC GONADOTROPIN ASSAY: CPT

## 2023-01-24 PROCEDURE — 99285 EMERGENCY DEPT VISIT HI MDM: CPT

## 2023-01-24 PROCEDURE — 74011000636 HC RX REV CODE- 636: Performed by: EMERGENCY MEDICINE

## 2023-01-24 PROCEDURE — 74011250636 HC RX REV CODE- 250/636: Performed by: PHYSICIAN ASSISTANT

## 2023-01-24 RX ORDER — DOXYCYCLINE HYCLATE 100 MG
100 TABLET ORAL 2 TIMES DAILY
Qty: 14 TABLET | Refills: 0 | Status: SHIPPED | OUTPATIENT
Start: 2023-01-24 | End: 2023-01-31

## 2023-01-24 RX ORDER — BENZONATATE 100 MG/1
100 CAPSULE ORAL
Qty: 30 CAPSULE | Refills: 0 | Status: SHIPPED | OUTPATIENT
Start: 2023-01-24 | End: 2023-01-31

## 2023-01-24 RX ORDER — ONDANSETRON 2 MG/ML
4 INJECTION INTRAMUSCULAR; INTRAVENOUS
Status: COMPLETED | OUTPATIENT
Start: 2023-01-24 | End: 2023-01-24

## 2023-01-24 RX ORDER — POLYETHYLENE GLYCOL 3350 17 G/17G
17 POWDER, FOR SOLUTION ORAL DAILY
Qty: 510 G | Refills: 0 | Status: SHIPPED | OUTPATIENT
Start: 2023-01-24 | End: 2023-02-23

## 2023-01-24 RX ADMIN — ONDANSETRON 4 MG: 2 INJECTION INTRAMUSCULAR; INTRAVENOUS at 16:19

## 2023-01-24 RX ADMIN — IOPAMIDOL 70 ML: 612 INJECTION, SOLUTION INTRAVENOUS at 16:50

## 2023-01-24 RX ADMIN — SODIUM CHLORIDE 1000 ML: 9 INJECTION, SOLUTION INTRAVENOUS at 16:19

## 2023-01-24 NOTE — ED TRIAGE NOTES
Pt states she has not had BM for 20 days. PMH CVA. Takes dulcolax daily and reports frequent issues with constipation. Reports lower abd pain and bloating.

## 2023-01-24 NOTE — ED PROVIDER NOTES
EMERGENCY DEPARTMENT HISTORY AND PHYSICAL EXAM    Date: 1/24/2023  Patient Name: Rosas Monahan    History of Presenting Illness     Chief Complaint   Patient presents with    Constipation         History Provided By: Patient      Additional History (Context): Rosas Monahan is a 59 y.o. female with a history of high cholesterol, hypertension and stroke with residual right-sided deficits who presents today for a 30-day history of constipation. Patient reports she is still passing gas and able to eat and drink however has not had a single bowel movement since 26 December. Patient reports he has recently been going through a lot after she lost her mother shortly after [place]. Has not tried anything for constipation at home. Reports intermittent history of constipation. Denies any nausea or vomiting. Reports 1 abdominal surgical history but is unsure what she had done. PCP: Greg Bonilla MD    Current Outpatient Medications   Medication Sig Dispense Refill    doxycycline (VIBRA-TABS) 100 mg tablet Take 1 Tablet by mouth two (2) times a day for 7 days. 14 Tablet 0    benzonatate (Tessalon Perles) 100 mg capsule Take 1 Capsule by mouth three (3) times daily as needed for Cough for up to 7 days. 30 Capsule 0    polyethylene glycol (Miralax) 17 gram/dose powder Take 17 g by mouth daily for 30 days. 1 tablespoon with 8 oz of water daily 510 g 0    aspirin delayed-release 81 mg tablet TAKE 1 TABLET BY MOUTH EVERY DAY IN THE MORNING 90 Tablet 1    atorvastatin (LIPITOR) 40 mg tablet Take 1 Tablet by mouth daily. 90 Tablet 1    ferrous sulfate (IRON) 325 mg (65 mg iron) EC tablet Take 1 Tablet by mouth daily. 30 Tablet 2    potassium chloride (K-DUR, KLOR-CON M20) 20 mEq tablet Take 1 Tablet by mouth in the morning. Indications: low amount of potassium in the blood 5 Tablet 0    amLODIPine (NORVASC) 10 mg tablet Take 1 Tablet by mouth in the morning.  90 Tablet 1    esomeprazole (NexIUM) 20 mg capsule Take 2 Capsules by mouth two (2) times a day. 60 Capsule 1    lisinopriL (PRINIVIL, ZESTRIL) 20 mg tablet Take 1 Tablet by mouth in the morning. 90 Tablet 1    levETIRAcetam (KEPPRA) 500 mg tablet TAKE 1 TABLET BY MOUTH TWO TIMES A DAY. 60 Tablet 4    clopidogreL (PLAVIX) 75 mg tab Take 1 Tablet by mouth daily. 30 Tablet 2    polyethylene glycol (Miralax) 17 gram/dose powder Take 17 g by mouth daily. 1 tablespoon with 8 oz of water daily 210 g 0       Past History     Past Medical History:  Past Medical History:   Diagnosis Date    CVA (cerebral vascular accident) ([facility] [address]     HLD (hyperlipidemia)     HTN (hypertension)     Right hemiparesis ([facility] [address]     Seizure ([facility] [address]        Past Surgical History:  No past surgical history on file. Family History:  No family history on file. Social History:  Social History     Tobacco Use    Smoking status: Never    Smokeless tobacco: Never   Substance Use Topics    Alcohol use: Never    Drug use: Never       Allergies: Allergies   Allergen Reactions    Aggrenox [Aspirin-Dipyridamole] Swelling         Review of Systems   Review of Systems   Constitutional:  Negative for chills and fever. HENT:  Negative for congestion, rhinorrhea and sore throat. Respiratory:  Negative for cough and shortness of breath. Cardiovascular:  Negative for chest pain. Gastrointestinal:  Positive for abdominal pain and constipation. Negative for blood in stool, diarrhea, nausea and vomiting. Genitourinary:  Negative for dysuria, frequency and hematuria. Musculoskeletal:  Negative for back pain and myalgias. Skin:  Negative for rash and wound. Neurological:  Negative for dizziness and headaches. All other systems reviewed and are negative.   All Other Systems Negative  Physical Exam     Vitals:    01/24/23 1305 01/24/23 1620   BP: 115/83    Pulse: (!) 106    Resp: 16    Temp: 97.8 °F (36.6 °C)    SpO2: 99%    Weight:  74.8 kg (165 lb)   Height:  5' 6\" (1.676 m)     Physical Exam  Vitals and nursing note reviewed. Constitutional:       General: She is not in acute distress. Appearance: She is well-developed. She is not diaphoretic. HENT:      Head: Normocephalic and atraumatic. Eyes:      Conjunctiva/sclera: Conjunctivae normal.   Cardiovascular:      Rate and Rhythm: Normal rate and regular rhythm. Heart sounds: Normal heart sounds. Pulmonary:      Effort: Pulmonary effort is normal. No respiratory distress. Breath sounds: Normal breath sounds. Chest:      Chest wall: No tenderness. Abdominal:      General: Bowel sounds are normal. There is distension (mild). Palpations: Abdomen is soft. Tenderness: There is no abdominal tenderness. There is no guarding or rebound. Musculoskeletal:         General: No deformity. Cervical back: Normal range of motion and neck supple. Skin:     General: Skin is warm and dry. Neurological:      Mental Status: She is alert and oriented to person, place, and time. Deep Tendon Reflexes: Reflexes are normal and symmetric. Diagnostic Study Results     Labs -     Recent Results (from the past 12 hour(s))   CBC WITH AUTOMATED DIFF    Collection Time: 01/24/23  3:12 PM   Result Value Ref Range    WBC 6.8 4.6 - 13.2 K/uL    RBC 4.66 4.20 - 5.30 M/uL    HGB 12.2 12.0 - 16.0 g/dL    HCT 37.6 35.0 - 45.0 %    MCV 80.7 78.0 - 100.0 FL    MCH 26.2 24.0 - 34.0 PG    MCHC 32.4 31.0 - 37.0 g/dL    RDW 15.5 (H) 11.6 - 14.5 %    PLATELET 871 306 - 546 K/uL    MPV 9.7 9.2 - 11.8 FL    NRBC 0.0 0  WBC    ABSOLUTE NRBC 0.00 0.00 - 0.01 K/uL    NEUTROPHILS 52 40 - 73 %    LYMPHOCYTES 38 21 - 52 %    MONOCYTES 7 3 - 10 %    EOSINOPHILS 2 0 - 5 %    BASOPHILS 0 0 - 2 %    IMMATURE GRANULOCYTES 0 0.0 - 0.5 %    ABS. NEUTROPHILS 3.5 1.8 - 8.0 K/UL    ABS. LYMPHOCYTES 2.6 0.9 - 3.6 K/UL    ABS. MONOCYTES 0.5 0.05 - 1.2 K/UL    ABS. EOSINOPHILS 0.1 0.0 - 0.4 K/UL    ABS. BASOPHILS 0.0 0.0 - 0.1 K/UL    ABS. IMM.  GRANS. 0.0 0.00 - 0.04 K/UL    DF AUTOMATED     METABOLIC PANEL, COMPREHENSIVE    Collection Time: 01/24/23  3:12 PM   Result Value Ref Range    Sodium 134 (L) 136 - 145 mmol/L    Potassium 4.4 3.5 - 5.5 mmol/L    Chloride 104 100 - 111 mmol/L    CO2 26 21 - 32 mmol/L    Anion gap 4 3.0 - 18 mmol/L    Glucose 89 74 - 99 mg/dL    BUN 5 (L) 7.0 - 18 MG/DL    Creatinine 0.80 0.6 - 1.3 MG/DL    BUN/Creatinine ratio 6 (L) 12 - 20      eGFR >60 >60 ml/min/1.73m2    Calcium 8.9 8.5 - 10.1 MG/DL    Bilirubin, total 0.7 0.2 - 1.0 MG/DL    ALT (SGPT) 18 13 - 56 U/L    AST (SGOT) 43 (H) 10 - 38 U/L    Alk. phosphatase 96 45 - 117 U/L    Protein, total 10.1 (H) 6.4 - 8.2 g/dL    Albumin 3.3 (L) 3.4 - 5.0 g/dL    Globulin 6.8 (H) 2.0 - 4.0 g/dL    A-G Ratio 0.5 (L) 0.8 - 1.7     LIPASE    Collection Time: 01/24/23  3:12 PM   Result Value Ref Range    Lipase 395 (H) 73 - 393 U/L   MAGNESIUM    Collection Time: 01/24/23  3:12 PM   Result Value Ref Range    Magnesium 2.2 1.6 - 2.6 mg/dL   HCG QL SERUM    Collection Time: 01/24/23  3:12 PM   Result Value Ref Range    HCG, Ql. Negative NEG         Radiologic Studies -   CT ABD PELV W CONT   Final Result      There is increase gas and stool throughout the colon from the cecum to the mid   to distal descending colon without definitive evidence of obstruction. Potential   region of mild narrowing of the lumen of the colon vs. a peristaltic wave at the   junction of the descending with the rectosigmoid. Recommend attention to this   area on follow-up colonoscopy. Focal groundglass infiltrate at the right lung base of infection, inflammation   or subsegmental atelectasis   Tiny umbilical hernia contains fat and the anterior aspect of the nonobstructed   loop of small bowel. Several mildly enlarged inguinal nodes. These are nonspecific and likely   reactive from prior infection or inflammation. Several stable intrahepatic hypodensities statistically likely to be simple   cysts.         CT Results  (Last 48 hours)                 01/24/23 1713  CT ABD PELV W CONT Final result    Impression:      There is increase gas and stool throughout the colon from the cecum to the mid   to distal descending colon without definitive evidence of obstruction. Potential   region of mild narrowing of the lumen of the colon vs. a peristaltic wave at the   junction of the descending with the rectosigmoid. Recommend attention to this   area on follow-up colonoscopy. Focal groundglass infiltrate at the right lung base of infection, inflammation   or subsegmental atelectasis   Tiny umbilical hernia contains fat and the anterior aspect of the nonobstructed   loop of small bowel. Several mildly enlarged inguinal nodes. These are nonspecific and likely   reactive from prior infection or inflammation. Several stable intrahepatic hypodensities statistically likely to be simple   cysts. Narrative:  EXAM: CT ABDOMEN AND PELVIS WITH CONTRAST       CLINICAL HISTORY/INDICATION:  constipation  x1 month associated with abdominal   pain, on physical exam there is mild abdominal distention and tachycardia        COMPARISON: CT abdomen and pelvis 12/2/2021. TECHNIQUE: Following the uneventful intravenous administration of 100 cc of   nonionic contrast, dynamic enhanced scanning of the abdomen and pelvis was   performed using standard 5 mm axial images. Coronal and sagittal reformations   obtained. All CT scans at this facility are performed using dose optimization technique as   appropriate to a performed exam, to include automated exposure control,   adjustment of the mA and/or kV according to patient's size (including   appropriate matching for site-specific examinations), or use of iterative   reconstruction technique. FINDINGS:        Abdomen -       Focal groundglass opacity at the left lung base. Thin linear band of dependent atelectasis on the right. The liver and spleen are normal in size and density.  Stable subcentimeter   scattered intrahepatic hypodensities statistically likely to be simple cysts. The biliary tree is not dilated. No abnormality of the gallbladder. There is bilateral renal function without obstruction, without cyst, stone or   mass. Adrenals and Pancreas  are of normal CT appearance. There is no free fluid or free air. Moderate hiatal hernia. No abnormality of the small bowel. Gas and stool within   the colon from the level of the cecum to the mid to distal descending colon. Smaller volume of gas and stool in the rectum and rectosigmoid colon there is a   narrowed loop of colon at the junction of the descending with the rectosigmoid. No wall thickening is demonstrated. No surrounding inflammation. See coronal   image 27 through 31. Appendix normal.   Anterior aspect of a loop of small bowel is seen at a small umbilical hernia   site without evidence of obstruction. Sagittal image 42. There is no significant adenopathy. Pelvis -       There is no free pelvic fluid. The pelvic viscera are unremarkable. The bladder is incompletely distended but unremarkable. Several mildly enlarged bilateral inguinal nodes. At the left inguinal region   there is an oval-shaped node measuring 1.8 x 1 cm. Axial image 73. Review of osseous structures throughout on bone window settings shows no   significant osseous pathology. CXR Results  (Last 48 hours)      None              Medical Decision Making   I am the first provider for this patient. I reviewed the vital signs, available nursing notes, past medical history, past surgical history, family history and social history. Vital Signs-Reviewed the patient's vital signs.       Records Reviewed: Nursing Notes and Old Medical Records     Procedures: None   Procedures    Provider Notes (Medical Decision Making):     Differential: Constipation, bowel obstruction, gastroenteritis, IBS, IBD, cancer    Plan: We will order labs and CT abdomen and pelvis. ED Course as of 01/24/23 1746 Tue Jan 24, 2023   1704 Patient in CT now  [CS]   1746 Have discussed multiple incidental findings on CT with both the patient, her daughter and her son. Patient states that she has an appointment for colonoscopy and an endoscopy on February 16 which he plans to keep. Did discuss incidental finding of pneumonia and patient then admits that she has been coughing. Will discharge home with short course of antibiotics and antitussives. Have also encouraged MiraLAX for constipation. Have stressed the importance of hydration. Patient is eating and drinking and tolerating p.o. without any difficulties at this time. Requesting to go home. Will discharge home. [CS]      ED Course User Index  [CS] [place], [place]         MED RECONCILIATION:  No current facility-administered medications for this encounter. Current Outpatient Medications   Medication Sig    doxycycline (VIBRA-TABS) 100 mg tablet Take 1 Tablet by mouth two (2) times a day for 7 days. benzonatate (Tessalon Perles) 100 mg capsule Take 1 Capsule by mouth three (3) times daily as needed for Cough for up to 7 days. polyethylene glycol (Miralax) 17 gram/dose powder Take 17 g by mouth daily for 30 days. 1 tablespoon with 8 oz of water daily    aspirin delayed-release 81 mg tablet TAKE 1 TABLET BY MOUTH EVERY DAY IN THE MORNING    atorvastatin (LIPITOR) 40 mg tablet Take 1 Tablet by mouth daily. ferrous sulfate (IRON) 325 mg (65 mg iron) EC tablet Take 1 Tablet by mouth daily. potassium chloride (K-DUR, KLOR-CON M20) 20 mEq tablet Take 1 Tablet by mouth in the morning. Indications: low amount of potassium in the blood    amLODIPine (NORVASC) 10 mg tablet Take 1 Tablet by mouth in the morning. esomeprazole (NexIUM) 20 mg capsule Take 2 Capsules by mouth two (2) times a day.     lisinopriL (PRINIVIL, ZESTRIL) 20 mg tablet Take 1 Tablet by mouth in the morning. levETIRAcetam (KEPPRA) 500 mg tablet TAKE 1 TABLET BY MOUTH TWO TIMES A DAY. clopidogreL (PLAVIX) 75 mg tab Take 1 Tablet by mouth daily. polyethylene glycol (Miralax) 17 gram/dose powder Take 17 g by mouth daily. 1 tablespoon with 8 oz of water daily       Disposition:  Home     DISCHARGE NOTE:   Pt has been reexamined. Patient has no new complaints, changes, or physical findings. Care plan outlined and precautions discussed. Results of workup were reviewed with the patient. All medications were reviewed with the patient. All of pt's questions and concerns were addressed. Patient was instructed and agrees to follow up with PCP/GI as well as to return to the ED upon further deterioration. Patient is ready to go home. Follow-up Information       Follow up With Specialties Details Why Contact Info    [facility] EMERGENCY DEPT Emergency Medicine  As needed [address] [place] [address]    Ugo Coleman MD Internal Medicine Physician Schedule an appointment as soon as possible for a visit   [address] [place] [address]      Keep your appointment for your endoscopy and colonoscopy                Current Discharge Medication List        START taking these medications    Details   doxycycline (VIBRA-TABS) 100 mg tablet Take 1 Tablet by mouth two (2) times a day for 7 days. Qty: 14 Tablet, Refills: 0  Start date: 1/24/2023, End date: 1/31/2023      benzonatate (Tessalon Perles) 100 mg capsule Take 1 Capsule by mouth three (3) times daily as needed for Cough for up to 7 days. Qty: 30 Capsule, Refills: 0  Start date: 1/24/2023, End date: 1/31/2023      !! polyethylene glycol (Miralax) 17 gram/dose powder Take 17 g by mouth daily for 30 days. 1 tablespoon with 8 oz of water daily  Qty: 510 g, Refills: 0  Start date: 1/24/2023, End date: 2/23/2023       !! - Potential duplicate medications found. Please discuss with provider. CONTINUE these medications which have NOT CHANGED    Details   ! ! polyethylene glycol (Miralax) 17 gram/dose powder Take 17 g by mouth daily. 1 tablespoon with 8 oz of water daily  Qty: 210 g, Refills: 0       !! - Potential duplicate medications found. Please discuss with provider. Diagnosis     Clinical Impression:   1. Community acquired pneumonia, unspecified laterality    2. Constipation, unspecified constipation type    3. Abdominal pain, generalized    4. Abnormal CT scan          \"Please note that this dictation was completed with [employer], the computer voice recognition software. Quite often unanticipated grammatical, syntax, homophones, and other interpretive errors are inadvertently transcribed by the computer software. Please disregard these errors. Please excuse any errors that have escaped final proofreading. \"

## 2023-01-24 NOTE — ED NOTES
I have reviewed discharge instructions with the patient. The patient and her daughter verbalized understanding.

## 2023-02-13 DIAGNOSIS — D50.9 IRON DEFICIENCY ANEMIA, UNSPECIFIED: ICD-10-CM

## 2023-02-14 RX ORDER — FERROUS SULFATE 325(65) MG
TABLET ORAL
Qty: 30 TABLET | Refills: 2 | Status: SHIPPED | OUTPATIENT
Start: 2023-02-14

## 2023-03-09 ENCOUNTER — OFFICE VISIT (OUTPATIENT)
Facility: CLINIC | Age: 58
End: 2023-03-09
Payer: COMMERCIAL

## 2023-03-09 ENCOUNTER — TELEPHONE (OUTPATIENT)
Facility: CLINIC | Age: 58
End: 2023-03-09

## 2023-03-09 ENCOUNTER — HOME HEALTH ADMISSION (OUTPATIENT)
Age: 58
End: 2023-03-09

## 2023-03-09 VITALS
RESPIRATION RATE: 16 BRPM | DIASTOLIC BLOOD PRESSURE: 83 MMHG | OXYGEN SATURATION: 95 % | WEIGHT: 158 LBS | HEIGHT: 66 IN | HEART RATE: 90 BPM | BODY MASS INDEX: 25.39 KG/M2 | SYSTOLIC BLOOD PRESSURE: 126 MMHG

## 2023-03-09 DIAGNOSIS — M24.541 CONTRACTURE, RIGHT HAND: Primary | ICD-10-CM

## 2023-03-09 DIAGNOSIS — Z86.73 HISTORY OF CVA (CEREBROVASCULAR ACCIDENT): ICD-10-CM

## 2023-03-09 DIAGNOSIS — I10 ESSENTIAL (PRIMARY) HYPERTENSION: ICD-10-CM

## 2023-03-09 DIAGNOSIS — R56.9 CONVULSIONS, UNSPECIFIED CONVULSION TYPE (HCC): ICD-10-CM

## 2023-03-09 DIAGNOSIS — M24.541 CONTRACTURE, RIGHT HAND: ICD-10-CM

## 2023-03-09 DIAGNOSIS — I69.359 HEMIPLEGIA AND HEMIPARESIS FOLLOWING CEREBRAL INFARCTION AFFECTING UNSPECIFIED SIDE (HCC): ICD-10-CM

## 2023-03-09 DIAGNOSIS — R73.03 PREDIABETES: ICD-10-CM

## 2023-03-09 DIAGNOSIS — K59.00 CONSTIPATION, UNSPECIFIED CONSTIPATION TYPE: Primary | ICD-10-CM

## 2023-03-09 DIAGNOSIS — D50.9 IRON DEFICIENCY ANEMIA, UNSPECIFIED IRON DEFICIENCY ANEMIA TYPE: ICD-10-CM

## 2023-03-09 DIAGNOSIS — K21.9 GASTRO-ESOPHAGEAL REFLUX DISEASE WITHOUT ESOPHAGITIS: ICD-10-CM

## 2023-03-09 PROCEDURE — 3074F SYST BP LT 130 MM HG: CPT | Performed by: STUDENT IN AN ORGANIZED HEALTH CARE EDUCATION/TRAINING PROGRAM

## 2023-03-09 PROCEDURE — 99214 OFFICE O/P EST MOD 30 MIN: CPT | Performed by: STUDENT IN AN ORGANIZED HEALTH CARE EDUCATION/TRAINING PROGRAM

## 2023-03-09 PROCEDURE — 3079F DIAST BP 80-89 MM HG: CPT | Performed by: STUDENT IN AN ORGANIZED HEALTH CARE EDUCATION/TRAINING PROGRAM

## 2023-03-09 RX ORDER — POLYETHYLENE GLYCOL 3350 17 G/17G
17 POWDER, FOR SOLUTION ORAL DAILY
Qty: 578 G | Refills: 1 | Status: SHIPPED | OUTPATIENT
Start: 2023-03-09

## 2023-03-09 RX ORDER — SENNA AND DOCUSATE SODIUM 50; 8.6 MG/1; MG/1
2 TABLET, FILM COATED ORAL DAILY
Qty: 60 TABLET | Refills: 2 | Status: SHIPPED | OUTPATIENT
Start: 2023-03-09

## 2023-03-09 SDOH — ECONOMIC STABILITY: HOUSING INSECURITY
IN THE LAST 12 MONTHS, WAS THERE A TIME WHEN YOU DID NOT HAVE A STEADY PLACE TO SLEEP OR SLEPT IN A SHELTER (INCLUDING NOW)?: NO

## 2023-03-09 SDOH — ECONOMIC STABILITY: FOOD INSECURITY: WITHIN THE PAST 12 MONTHS, YOU WORRIED THAT YOUR FOOD WOULD RUN OUT BEFORE YOU GOT MONEY TO BUY MORE.: NEVER TRUE

## 2023-03-09 SDOH — ECONOMIC STABILITY: FOOD INSECURITY: WITHIN THE PAST 12 MONTHS, THE FOOD YOU BOUGHT JUST DIDN'T LAST AND YOU DIDN'T HAVE MONEY TO GET MORE.: NEVER TRUE

## 2023-03-09 SDOH — ECONOMIC STABILITY: INCOME INSECURITY: HOW HARD IS IT FOR YOU TO PAY FOR THE VERY BASICS LIKE FOOD, HOUSING, MEDICAL CARE, AND HEATING?: VERY HARD

## 2023-03-09 ASSESSMENT — ANXIETY QUESTIONNAIRES
7. FEELING AFRAID AS IF SOMETHING AWFUL MIGHT HAPPEN: 0
2. NOT BEING ABLE TO STOP OR CONTROL WORRYING: 0
1. FEELING NERVOUS, ANXIOUS, OR ON EDGE: 0
4. TROUBLE RELAXING: 0
GAD7 TOTAL SCORE: 0
3. WORRYING TOO MUCH ABOUT DIFFERENT THINGS: 0
IF YOU CHECKED OFF ANY PROBLEMS ON THIS QUESTIONNAIRE, HOW DIFFICULT HAVE THESE PROBLEMS MADE IT FOR YOU TO DO YOUR WORK, TAKE CARE OF THINGS AT HOME, OR GET ALONG WITH OTHER PEOPLE: NOT DIFFICULT AT ALL
6. BECOMING EASILY ANNOYED OR IRRITABLE: 0
5. BEING SO RESTLESS THAT IT IS HARD TO SIT STILL: 0

## 2023-03-09 ASSESSMENT — ENCOUNTER SYMPTOMS
NAUSEA: 0
BLOOD IN STOOL: 0
DIARRHEA: 0
BACK PAIN: 0
SHORTNESS OF BREATH: 0
WHEEZING: 0
RHINORRHEA: 0
VOMITING: 0
COUGH: 0
ABDOMINAL PAIN: 1
CONSTIPATION: 1
CHEST TIGHTNESS: 0

## 2023-03-09 ASSESSMENT — PATIENT HEALTH QUESTIONNAIRE - PHQ9
SUM OF ALL RESPONSES TO PHQ QUESTIONS 1-9: 0
2. FEELING DOWN, DEPRESSED OR HOPELESS: 0
SUM OF ALL RESPONSES TO PHQ9 QUESTIONS 1 & 2: 0
SUM OF ALL RESPONSES TO PHQ QUESTIONS 1-9: 0
1. LITTLE INTEREST OR PLEASURE IN DOING THINGS: 0

## 2023-03-09 NOTE — TELEPHONE ENCOUNTER
Amado Hurt from [facility] called stating that in order for the patient to receive home health assistance either an order for PTor OT has to be ordered with the referral. Home health is not provided as a stand alone service. Amado Hurt requests to be notified with an update @ [phone]. Please advise.

## 2023-03-09 NOTE — PROGRESS NOTES
Rosalia Jean is a 59 y.o. female presenting today for Follow-up Chronic Condition (Pt is requesting order for aide with bathing )  . Chief Complaint   Patient presents with    Follow-up Chronic Condition     Pt is requesting order for aide with bathing        HPI:  Rosalia Jean presents to the office today for follow up. Patient has a past medical history of HTN, CVA, HLD, anemia. Recently presented to the ED in January with constipation and abdominal pain. CT AP showed increased gas and stool throughout the colon. Incidentally found to have several multiple enlarged inguinal nodes. She was also found to have several stable intrahepatic hypodensities likely to be simple cysts. Focal groundglass infiltrate at right lung base. HTN : She is prescribed amlodipine and lisinopril. Denies any chest pain, palpitations, headaches or dizziness. CVA: She had a stroke in 2011. She has Rt sided residual deficit -with right arm contracture. She uses a cane to ambulate. Recently she has been having increased difficulty with bathing herself. She is requesting assistance while home health aide for this. Seizures: Patient is taking Keppra. No recent seizures. Hiatal hernia: Patient was noted to have anemia and hiatal hernia. Taking a PPI. She presented to ED in 9/22 for epigastric pain. Noted to have positive FIT in 12/21. She has burning epigastric pain despite taking PPI. No difficulty swallowing. Denies any dark or bloody stool. Denies having a colonoscopy. States she may have had an EGD in the past but is unsure. Recent hemoglobin is improved to 12.2. She is scheduled for a colonoscopy in April. Right foot pain: Had been referred to podiatry. Patient has multiple healthcare gaps. Review of Systems   Constitutional:  Negative for activity change, appetite change, chills, diaphoresis, fatigue, fever and unexpected weight change.    HENT:  Negative for congestion, nosebleeds, postnasal drip and rhinorrhea. Respiratory:  Negative for cough, chest tightness, shortness of breath and wheezing. Cardiovascular:  Negative for chest pain and leg swelling. Gastrointestinal:  Positive for abdominal pain and constipation. Negative for blood in stool, diarrhea, nausea and vomiting. Endocrine: Negative for polydipsia and polyphagia. Genitourinary:  Negative for decreased urine volume, dysuria, frequency and pelvic pain. Musculoskeletal:  Positive for arthralgias and gait problem. Negative for back pain, myalgias and neck pain. Neurological:  Positive for speech difficulty and weakness. Negative for dizziness, tremors, seizures, syncope, numbness and headaches. Psychiatric/Behavioral:  Negative for agitation and confusion. The patient is not nervous/anxious. All other systems reviewed and are negative. Allergies   Allergen Reactions    Aspirin-Dipyridamole Er Swelling       PHQ Screening   No flowsheet data found.     History  Past Medical History:   Diagnosis Date    CVA (cerebral vascular accident) ([facility] [address]     HLD (hyperlipidemia)     HTN (hypertension)     Right hemiparesis (HCC)     Seizure ([facility] [address]        Past Surgical History:   Procedure Laterality Date    US BREAST BIOPSY W LOC DEVICE 1ST LESION LEFT Left 9/20/2022    US BREAST BIOPSY W LOC DEVICE 1ST LESION LEFT 9/20/2022 [facility] RAD US       Social History     Socioeconomic History    Marital status: Single     Spouse name: Not on file    Number of children: Not on file    Years of education: Not on file    Highest education level: Not on file   Occupational History    Not on file   Tobacco Use    Smoking status: Never    Smokeless tobacco: Never   Substance and Sexual Activity    Alcohol use: Never    Drug use: Never    Sexual activity: Not on file   Other Topics Concern    Not on file   Social History Narrative    Not on file     Social Determinants of Health     Financial Resource Strain: High Risk    Difficulty of Paying Living Expenses: Very hard   Food Insecurity: No Food Insecurity    Worried About Running Out of Food in the Last Year: Never true    Ran Out of Food in the Last Year: Never true   Transportation Needs: Unknown    Lack of Transportation (Medical): Not on file    Lack of Transportation (Non-Medical): No   Physical Activity: Not on file   Stress: Not on file   Social Connections: Not on file   Intimate Partner Violence: Not on file   Housing Stability: Unknown    Unable to Pay for Housing in the Last Year: Not on file    Number of Places Lived in the Last Year: Not on file    Unstable Housing in the Last Year: No       Current Outpatient Medications   Medication Sig Dispense Refill    polyethylene glycol (GLYCOLAX) 17 GM/SCOOP powder Take 17 g by mouth daily 578 g 1    sennosides-docusate sodium (SENOKOT-S) 8.6-50 MG tablet Take 2 tablets by mouth daily 60 tablet 2    ferrous sulfate (IRON 325) 325 (65 Fe) MG tablet TAKE 1 TABLET BY MOUTH EVERY DAY 30 tablet 2    amLODIPine (NORVASC) 10 MG tablet Take 10 mg by mouth daily      aspirin 81 MG EC tablet Take 81 mg by mouth daily      atorvastatin (LIPITOR) 40 MG tablet Take 40 mg by mouth daily      clopidogrel (PLAVIX) 75 MG tablet Take 75 mg by mouth daily      esomeprazole (NEXIUM) 20 MG delayed release capsule Take 40 mg by mouth 2 times daily      levETIRAcetam (KEPPRA) 500 MG tablet TAKE 1 TABLET BY MOUTH TWO TIMES A DAY. lisinopril (PRINIVIL;ZESTRIL) 20 MG tablet Take 20 mg by mouth daily      potassium chloride (KLOR-CON M) 20 MEQ extended release tablet Take 20 mEq by mouth daily       No current facility-administered medications for this visit. /83 (Site: Left Upper Arm, Position: Sitting, Cuff Size: Large Adult)   Pulse 90   Resp 16   Ht 5' 6\" (1.676 m)   Wt 158 lb (71.7 kg)   SpO2 95%   BMI 25.50 kg/m²      Physical Exam  Vitals and nursing note reviewed. Constitutional:       General: She is not in acute distress.      Appearance: Normal appearance. She is not ill-appearing, toxic-appearing or diaphoretic. HENT:      Head: Normocephalic and atraumatic. Eyes:      General: No scleral icterus. Extraocular Movements: Extraocular movements intact. Conjunctiva/sclera: Conjunctivae normal.      Pupils: Pupils are equal, round, and reactive to light. Cardiovascular:      Rate and Rhythm: Normal rate and regular rhythm. Pulses: Normal pulses. Heart sounds: Normal heart sounds. No murmur heard. Pulmonary:      Effort: Pulmonary effort is normal. No respiratory distress. Breath sounds: Normal breath sounds. No wheezing or rales. Musculoskeletal:         General: Deformity present. Cervical back: Normal range of motion and neck supple. Comments: Right-sided weakness. Right arm/hand contracture   Skin:     General: Skin is warm and dry. Neurological:      General: No focal deficit present. Mental Status: She is alert and oriented to person, place, and time. Mental status is at baseline. Cranial Nerves: No cranial nerve deficit. Motor: Weakness present. Gait: Gait abnormal.      Comments: Using a cane   Psychiatric:         Mood and Affect: Mood normal.         Behavior: Behavior normal.         Thought Content: Thought content normal.         Judgment: Judgment normal.        No visits with results within 3 Month(s) from this visit. Latest known visit with results is:   Office Visit on 10/13/2021   Component Date Value Ref Range Status    Hemoglobin A1C, POC 10/13/2021 5.5  % Final       No results found for any visits on 03/09/23. Patient Care Team:  Patient Care Team:  Jennifer Ledesma MD as PCP - Mendoza Champagne MD as PCP - [facility]      Assessment / Plan:     Diagnosis Orders   1. Constipation, unspecified constipation type  polyethylene glycol (GLYCOLAX) 17 GM/SCOOP powder    sennosides-docusate sodium (SENOKOT-S) 8.6-50 MG tablet      2.  Iron deficiency anemia, unspecified iron deficiency anemia type        3. Gastro-esophageal reflux disease without esophagitis        4. Essential (primary) hypertension        5. Prediabetes        6. Contracture, right hand  [address], [facility]      7. Hemiplegia and hemiparesis following cerebral infarction affecting unspecified side (HCC)  [address] [facility], [facility]      8. History of CVA (cerebrovascular accident)  [facility]      9. Convulsions, unspecified convulsion type [facility])          Reviewed notes, labs and imaging from ED visit. HTN: BP is at goal.  Continue amlodipine and lisinopril. History of CVA: Continue aspirin and statin. LDL at goal.     Prediabetes:  HbA1c is 5.8%. Constipation: Continue MiraLAX. Senna/Colace prescribed. We will discontinue iron pills. Patient's hemoglobin has improved and the iron pills may be causing the significant constipation. Anemia with GERD: Noted to have positive FIT test in 12/21. Hgb is 11.4. Continue PPI. Epigastric pain appears to be worsening. Has an appointment with GI scheduled for colonoscopy in April. History of seizures: No recent seizure. Continue Keppra. Return in about 4 months (around 7/9/2023). I asked the patient if she  had any questions and answered her  questions. The patient stated that she understands the treatment plan and agrees with the treatment plan    This document was created with a voice activated dictation system and may contain transcription errors.

## 2023-03-19 DIAGNOSIS — I10 ESSENTIAL (PRIMARY) HYPERTENSION: ICD-10-CM

## 2023-03-21 RX ORDER — AMLODIPINE BESYLATE 10 MG/1
TABLET ORAL
Qty: 90 TABLET | Refills: 1 | Status: SHIPPED | OUTPATIENT
Start: 2023-03-21

## 2023-05-05 ENCOUNTER — APPOINTMENT (OUTPATIENT)
Facility: HOSPITAL | Age: 58
End: 2023-05-05
Payer: COMMERCIAL

## 2023-05-05 ENCOUNTER — HOSPITAL ENCOUNTER (EMERGENCY)
Facility: HOSPITAL | Age: 58
Discharge: LEFT BEFORE DISCHARGE | End: 2023-05-05
Attending: EMERGENCY MEDICINE
Payer: COMMERCIAL

## 2023-05-05 VITALS
RESPIRATION RATE: 20 BRPM | OXYGEN SATURATION: 100 % | WEIGHT: 158 LBS | HEART RATE: 85 BPM | BODY MASS INDEX: 25.39 KG/M2 | SYSTOLIC BLOOD PRESSURE: 128 MMHG | TEMPERATURE: 98.7 F | HEIGHT: 66 IN | DIASTOLIC BLOOD PRESSURE: 78 MMHG

## 2023-05-05 DIAGNOSIS — E87.6 HYPOKALEMIA: ICD-10-CM

## 2023-05-05 DIAGNOSIS — R10.31 ABDOMINAL PAIN, RIGHT LOWER QUADRANT: ICD-10-CM

## 2023-05-05 DIAGNOSIS — R10.9 BILATERAL FLANK PAIN: Primary | ICD-10-CM

## 2023-05-05 LAB
ALBUMIN SERPL-MCNC: 3.2 G/DL (ref 3.4–5)
ALBUMIN/GLOB SERPL: 0.5 (ref 0.8–1.7)
ALP SERPL-CCNC: 94 U/L (ref 45–117)
ALT SERPL-CCNC: 20 U/L (ref 13–56)
ANION GAP SERPL CALC-SCNC: 8 MMOL/L (ref 3–18)
APPEARANCE UR: CLEAR
AST SERPL-CCNC: 30 U/L (ref 10–38)
BASOPHILS # BLD: 0 K/UL (ref 0–0.1)
BASOPHILS NFR BLD: 0 % (ref 0–2)
BILIRUB SERPL-MCNC: 0.3 MG/DL (ref 0.2–1)
BILIRUB UR QL: NEGATIVE
BUN SERPL-MCNC: 6 MG/DL (ref 7–18)
BUN/CREAT SERPL: 7 (ref 12–20)
CALCIUM SERPL-MCNC: 8.7 MG/DL (ref 8.5–10.1)
CHLORIDE SERPL-SCNC: 107 MMOL/L (ref 100–111)
CO2 SERPL-SCNC: 23 MMOL/L (ref 21–32)
COLOR UR: YELLOW
CREAT SERPL-MCNC: 0.82 MG/DL (ref 0.6–1.3)
DIFFERENTIAL METHOD BLD: ABNORMAL
EOSINOPHIL # BLD: 0.1 K/UL (ref 0–0.4)
EOSINOPHIL NFR BLD: 2 % (ref 0–5)
ERYTHROCYTE [DISTWIDTH] IN BLOOD BY AUTOMATED COUNT: 14.6 % (ref 11.6–14.5)
GLOBULIN SER CALC-MCNC: 6.5 G/DL (ref 2–4)
GLUCOSE SERPL-MCNC: 136 MG/DL (ref 74–99)
GLUCOSE UR STRIP.AUTO-MCNC: NEGATIVE MG/DL
HCT VFR BLD AUTO: 34.5 % (ref 35–45)
HGB BLD-MCNC: 11.2 G/DL (ref 12–16)
HGB UR QL STRIP: NEGATIVE
IMM GRANULOCYTES # BLD AUTO: 0 K/UL (ref 0–0.04)
IMM GRANULOCYTES NFR BLD AUTO: 0 % (ref 0–0.5)
KETONES UR QL STRIP.AUTO: NEGATIVE MG/DL
LEUKOCYTE ESTERASE UR QL STRIP.AUTO: NEGATIVE
LIPASE SERPL-CCNC: 460 U/L (ref 73–393)
LYMPHOCYTES # BLD: 2.6 K/UL (ref 0.9–3.6)
LYMPHOCYTES NFR BLD: 38 % (ref 21–52)
MAGNESIUM SERPL-MCNC: 1.9 MG/DL (ref 1.6–2.6)
MCH RBC QN AUTO: 26.1 PG (ref 24–34)
MCHC RBC AUTO-ENTMCNC: 32.5 G/DL (ref 31–37)
MCV RBC AUTO: 80.4 FL (ref 78–100)
MONOCYTES # BLD: 0.6 K/UL (ref 0.05–1.2)
MONOCYTES NFR BLD: 8 % (ref 3–10)
NEUTS SEG # BLD: 3.6 K/UL (ref 1.8–8)
NEUTS SEG NFR BLD: 52 % (ref 40–73)
NITRITE UR QL STRIP.AUTO: NEGATIVE
NRBC # BLD: 0 K/UL (ref 0–0.01)
NRBC BLD-RTO: 0 PER 100 WBC
PH UR STRIP: 7.5 (ref 5–8)
PLATELET # BLD AUTO: 307 K/UL (ref 135–420)
PMV BLD AUTO: 9 FL (ref 9.2–11.8)
POTASSIUM SERPL-SCNC: 3.3 MMOL/L (ref 3.5–5.5)
PROT SERPL-MCNC: 9.7 G/DL (ref 6.4–8.2)
PROT UR STRIP-MCNC: NEGATIVE MG/DL
RBC # BLD AUTO: 4.29 M/UL (ref 4.2–5.3)
SODIUM SERPL-SCNC: 138 MMOL/L (ref 136–145)
SP GR UR REFRACTOMETRY: 1.01 (ref 1–1.03)
UROBILINOGEN UR QL STRIP.AUTO: 0.2 EU/DL (ref 0.2–1)
WBC # BLD AUTO: 7 K/UL (ref 4.6–13.2)

## 2023-05-05 PROCEDURE — 80053 COMPREHEN METABOLIC PANEL: CPT

## 2023-05-05 PROCEDURE — 83735 ASSAY OF MAGNESIUM: CPT

## 2023-05-05 PROCEDURE — 99284 EMERGENCY DEPT VISIT MOD MDM: CPT

## 2023-05-05 PROCEDURE — 6370000000 HC RX 637 (ALT 250 FOR IP): Performed by: EMERGENCY MEDICINE

## 2023-05-05 PROCEDURE — 85025 COMPLETE CBC W/AUTO DIFF WBC: CPT

## 2023-05-05 PROCEDURE — 2580000003 HC RX 258: Performed by: EMERGENCY MEDICINE

## 2023-05-05 PROCEDURE — 81003 URINALYSIS AUTO W/O SCOPE: CPT

## 2023-05-05 PROCEDURE — 93005 ELECTROCARDIOGRAM TRACING: CPT | Performed by: EMERGENCY MEDICINE

## 2023-05-05 PROCEDURE — 83690 ASSAY OF LIPASE: CPT

## 2023-05-05 RX ORDER — 0.9 % SODIUM CHLORIDE 0.9 %
1000 INTRAVENOUS SOLUTION INTRAVENOUS ONCE
Status: COMPLETED | OUTPATIENT
Start: 2023-05-05 | End: 2023-05-05

## 2023-05-05 RX ADMIN — POTASSIUM BICARBONATE 40 MEQ: 782 TABLET, EFFERVESCENT ORAL at 20:14

## 2023-05-05 RX ADMIN — SODIUM CHLORIDE 1000 ML: 9 INJECTION, SOLUTION INTRAVENOUS at 18:50

## 2023-05-05 ASSESSMENT — PAIN SCALES - GENERAL: PAINLEVEL_OUTOF10: 8

## 2023-05-05 ASSESSMENT — ENCOUNTER SYMPTOMS
NAUSEA: 0
ABDOMINAL PAIN: 1
EYES NEGATIVE: 1
RESPIRATORY NEGATIVE: 1
BACK PAIN: 1
ALLERGIC/IMMUNOLOGIC NEGATIVE: 1
VOMITING: 0
CONSTIPATION: 1

## 2023-05-05 ASSESSMENT — PAIN - FUNCTIONAL ASSESSMENT: PAIN_FUNCTIONAL_ASSESSMENT: 0-10

## 2023-05-05 ASSESSMENT — PAIN DESCRIPTION - LOCATION: LOCATION: ABDOMEN

## 2023-05-05 NOTE — ED TRIAGE NOTES
Pt arrived via EMS presenting to ER c/o abdominal pain and constipation x several days and low back pain that started yesterday. Right upper extremity contracted from previous stroke.     Daughter's contact number: [phone]

## 2023-05-05 NOTE — ED PROVIDER NOTES
[facility] EMERGENCY DEPT  EMERGENCY DEPARTMENT ENCOUNTER      Pt Name: Jessica Hughes  MRN: [id number]  [place] 1965  Date of evaluation: 5/5/2023  Provider: Pennelope Castleman, [address]       Chief Complaint   Patient presents with    Abdominal Pain    Back Pain         HISTORY OF PRESENT ILLNESS   (Location/Symptom, Timing/Onset, Context/Setting, Quality, Duration, Modifying Factors, Severity)  Note limiting factors. Jessica Hughes is a 59 y.o. female who presents to the emergency department complaint of right lower quadrant abdominal pain as well as bilateral back pain. She said her symptoms since yesterday. Has not had a bowel movement for several days. Has a history of constipation. Review of records shows that she may have had a colonoscopy in 2008. On MiraLAX. History of stroke with right-sided hemiparesis. Is also reporting back pain. Denies saddle anesthesia dysuria fever flank pain or radicular pain. Denies any difficulty with her voiding. HPI    Nursing Notes were reviewed. REVIEW OF SYSTEMS    (2-9 systems for level 4, 10 or more for level 5)     Review of Systems   Constitutional:  Negative for appetite change and unexpected weight change. HENT: Negative. Eyes: Negative. Respiratory: Negative. Cardiovascular: Negative. Gastrointestinal:  Positive for abdominal pain and constipation. Negative for nausea and vomiting. Endocrine: Negative. Genitourinary:  Positive for flank pain. Negative for difficulty urinating. Musculoskeletal:  Positive for back pain. Skin: Negative. Allergic/Immunologic: Negative. Neurological: Negative. Negative for weakness and numbness. Hematological: Negative. Psychiatric/Behavioral: Negative. Except as noted above the remainder of the review of systems was reviewed and negative.        PAST MEDICAL HISTORY     Past Medical History:   Diagnosis Date    CVA (cerebral vascular accident) ([facility] [address]     HLD (hyperlipidemia)

## 2023-05-06 LAB
EKG ATRIAL RATE: 112 BPM
EKG DIAGNOSIS: NORMAL
EKG P AXIS: 54 DEGREES
EKG P-R INTERVAL: 162 MS
EKG Q-T INTERVAL: 332 MS
EKG QRS DURATION: 64 MS
EKG QTC CALCULATION (BAZETT): 453 MS
EKG R AXIS: 45 DEGREES
EKG T AXIS: 64 DEGREES
EKG VENTRICULAR RATE: 112 BPM

## 2023-05-06 PROCEDURE — 93010 ELECTROCARDIOGRAM REPORT: CPT | Performed by: INTERNAL MEDICINE

## 2023-05-06 NOTE — ED NOTES
Pt refused CT scan. Made RN take Iv out. Linford Knife to get PA. Pt was eloped when I came back to inform pt PA was en route. ELOPED.       Keily Jimenez RN  05/05/23 0394

## 2023-05-06 NOTE — ED NOTES
Assumed care of the patient at this time. Discussed with Yolis FLETCHER concerning patient Lael Hatchet , standard discussion of reason for visit, HPI, ROS, PE, and current results available. Planned disposition: home  Plan/Pending: CT, UA    9:43 PM  PROGRESS NOTE:   Urinalysis is negative. Pt refused CT and eloped. She was strongly encouraged to stay until completion of her work-up but eloped anyways. Patient was observed to be in no acute distress. Patient is aware that she is welcome to return to ED anytime. Dictation disclaimer:  Please note that this dictation was completed with [employer], the computer voice recognition software. Quite often unanticipated grammatical, syntax, homophones, and other interpretive errors are inadvertently transcribed by the computer software. Please disregard these errors. Please excuse any errors that have escaped final proofreading.        JUSTINE Iglesias  05/05/23 2146       JUSTINE Iglesias  05/05/23 214

## 2023-06-19 DIAGNOSIS — I69.359 HEMIPLEGIA AND HEMIPARESIS FOLLOWING CEREBRAL INFARCTION AFFECTING UNSPECIFIED SIDE (HCC): ICD-10-CM

## 2023-06-19 DIAGNOSIS — K59.00 CONSTIPATION, UNSPECIFIED CONSTIPATION TYPE: ICD-10-CM

## 2023-06-19 DIAGNOSIS — E78.2 MIXED HYPERLIPIDEMIA: ICD-10-CM

## 2023-06-19 DIAGNOSIS — D50.9 IRON DEFICIENCY ANEMIA, UNSPECIFIED: ICD-10-CM

## 2023-06-21 RX ORDER — ASPIRIN 81 MG/1
TABLET, COATED ORAL
Qty: 90 TABLET | Refills: 1 | Status: SHIPPED | OUTPATIENT
Start: 2023-06-21

## 2023-06-21 RX ORDER — ATORVASTATIN CALCIUM 40 MG/1
TABLET, FILM COATED ORAL
Qty: 90 TABLET | Refills: 1 | Status: SHIPPED | OUTPATIENT
Start: 2023-06-21

## 2023-06-21 RX ORDER — INCONTINENCE PAD,LINER,DISP
EACH MISCELLANEOUS
Qty: 60 TABLET | Refills: 2 | Status: SHIPPED | OUTPATIENT
Start: 2023-06-21

## 2023-06-21 RX ORDER — FERROUS SULFATE 325(65) MG
TABLET ORAL
Qty: 30 TABLET | Refills: 2 | Status: SHIPPED | OUTPATIENT
Start: 2023-06-21

## 2023-07-06 ENCOUNTER — OFFICE VISIT (OUTPATIENT)
Facility: CLINIC | Age: 58
End: 2023-07-06
Payer: COMMERCIAL

## 2023-07-06 VITALS
WEIGHT: 160 LBS | SYSTOLIC BLOOD PRESSURE: 112 MMHG | DIASTOLIC BLOOD PRESSURE: 81 MMHG | HEART RATE: 101 BPM | BODY MASS INDEX: 25.71 KG/M2 | HEIGHT: 66 IN | RESPIRATION RATE: 16 BRPM | OXYGEN SATURATION: 95 %

## 2023-07-06 DIAGNOSIS — K21.9 GASTROESOPHAGEAL REFLUX DISEASE WITHOUT ESOPHAGITIS: ICD-10-CM

## 2023-07-06 DIAGNOSIS — R73.03 PREDIABETES: ICD-10-CM

## 2023-07-06 DIAGNOSIS — Z86.73 HISTORY OF CVA (CEREBROVASCULAR ACCIDENT): ICD-10-CM

## 2023-07-06 DIAGNOSIS — I69.359 HEMIPLEGIA AND HEMIPARESIS FOLLOWING CEREBRAL INFARCTION AFFECTING UNSPECIFIED SIDE (HCC): ICD-10-CM

## 2023-07-06 DIAGNOSIS — K59.00 CONSTIPATION, UNSPECIFIED CONSTIPATION TYPE: ICD-10-CM

## 2023-07-06 DIAGNOSIS — Z12.31 BREAST CANCER SCREENING BY MAMMOGRAM: ICD-10-CM

## 2023-07-06 DIAGNOSIS — E87.6 HYPOKALEMIA: ICD-10-CM

## 2023-07-06 DIAGNOSIS — I10 ESSENTIAL (PRIMARY) HYPERTENSION: Primary | ICD-10-CM

## 2023-07-06 DIAGNOSIS — R56.9 CONVULSIONS, UNSPECIFIED CONVULSION TYPE (HCC): ICD-10-CM

## 2023-07-06 DIAGNOSIS — R26.81 UNSTEADY GAIT: ICD-10-CM

## 2023-07-06 DIAGNOSIS — E78.2 MIXED HYPERLIPIDEMIA: ICD-10-CM

## 2023-07-06 DIAGNOSIS — M24.541 CONTRACTURE, RIGHT HAND: ICD-10-CM

## 2023-07-06 PROCEDURE — 3074F SYST BP LT 130 MM HG: CPT | Performed by: STUDENT IN AN ORGANIZED HEALTH CARE EDUCATION/TRAINING PROGRAM

## 2023-07-06 PROCEDURE — 3079F DIAST BP 80-89 MM HG: CPT | Performed by: STUDENT IN AN ORGANIZED HEALTH CARE EDUCATION/TRAINING PROGRAM

## 2023-07-06 PROCEDURE — 99214 OFFICE O/P EST MOD 30 MIN: CPT | Performed by: STUDENT IN AN ORGANIZED HEALTH CARE EDUCATION/TRAINING PROGRAM

## 2023-07-06 RX ORDER — LEVETIRACETAM 500 MG/1
500 TABLET ORAL 2 TIMES DAILY
Qty: 90 TABLET | Refills: 1 | Status: SHIPPED | OUTPATIENT
Start: 2023-07-06

## 2023-07-06 RX ORDER — PANTOPRAZOLE SODIUM 20 MG/1
20 TABLET, DELAYED RELEASE ORAL
COMMUNITY
Start: 2023-07-06 | End: 2023-07-06

## 2023-07-06 RX ORDER — PANTOPRAZOLE SODIUM 40 MG/1
40 TABLET, DELAYED RELEASE ORAL
Qty: 180 TABLET | Refills: 1 | Status: SHIPPED | OUTPATIENT
Start: 2023-07-06

## 2023-07-06 SDOH — ECONOMIC STABILITY: HOUSING INSECURITY
IN THE LAST 12 MONTHS, WAS THERE A TIME WHEN YOU DID NOT HAVE A STEADY PLACE TO SLEEP OR SLEPT IN A SHELTER (INCLUDING NOW)?: PATIENT REFUSED

## 2023-07-06 SDOH — ECONOMIC STABILITY: FOOD INSECURITY: WITHIN THE PAST 12 MONTHS, YOU WORRIED THAT YOUR FOOD WOULD RUN OUT BEFORE YOU GOT MONEY TO BUY MORE.: PATIENT DECLINED

## 2023-07-06 SDOH — ECONOMIC STABILITY: INCOME INSECURITY: HOW HARD IS IT FOR YOU TO PAY FOR THE VERY BASICS LIKE FOOD, HOUSING, MEDICAL CARE, AND HEATING?: PATIENT DECLINED

## 2023-07-06 SDOH — ECONOMIC STABILITY: FOOD INSECURITY: WITHIN THE PAST 12 MONTHS, THE FOOD YOU BOUGHT JUST DIDN'T LAST AND YOU DIDN'T HAVE MONEY TO GET MORE.: PATIENT DECLINED

## 2023-07-06 ASSESSMENT — ENCOUNTER SYMPTOMS
ABDOMINAL PAIN: 1
COUGH: 0
CHEST TIGHTNESS: 0
VOMITING: 0
CONSTIPATION: 1
BACK PAIN: 0
NAUSEA: 0
BLOOD IN STOOL: 0
RHINORRHEA: 0
SHORTNESS OF BREATH: 0
DIARRHEA: 0
WHEEZING: 0

## 2023-07-06 ASSESSMENT — ANXIETY QUESTIONNAIRES
5. BEING SO RESTLESS THAT IT IS HARD TO SIT STILL: 0
6. BECOMING EASILY ANNOYED OR IRRITABLE: 0
7. FEELING AFRAID AS IF SOMETHING AWFUL MIGHT HAPPEN: 0
4. TROUBLE RELAXING: 0
2. NOT BEING ABLE TO STOP OR CONTROL WORRYING: 0
IF YOU CHECKED OFF ANY PROBLEMS ON THIS QUESTIONNAIRE, HOW DIFFICULT HAVE THESE PROBLEMS MADE IT FOR YOU TO DO YOUR WORK, TAKE CARE OF THINGS AT HOME, OR GET ALONG WITH OTHER PEOPLE: NOT DIFFICULT AT ALL
1. FEELING NERVOUS, ANXIOUS, OR ON EDGE: 0
GAD7 TOTAL SCORE: 0
3. WORRYING TOO MUCH ABOUT DIFFERENT THINGS: 0

## 2023-07-06 ASSESSMENT — PATIENT HEALTH QUESTIONNAIRE - PHQ9
1. LITTLE INTEREST OR PLEASURE IN DOING THINGS: 0
SUM OF ALL RESPONSES TO PHQ QUESTIONS 1-9: 0
SUM OF ALL RESPONSES TO PHQ QUESTIONS 1-9: 0
2. FEELING DOWN, DEPRESSED OR HOPELESS: 0
SUM OF ALL RESPONSES TO PHQ QUESTIONS 1-9: 0
SUM OF ALL RESPONSES TO PHQ9 QUESTIONS 1 & 2: 0
SUM OF ALL RESPONSES TO PHQ QUESTIONS 1-9: 0

## 2023-07-06 NOTE — PROGRESS NOTES
Joanne Hodges is a 59 y.o. female presenting today for Follow-up Chronic Condition (Pt is requesting a order for a shower chair )  . Chief Complaint   Patient presents with    Follow-up Chronic Condition     Pt is requesting a order for a shower chair        HPI:  Joanne Hodges presents to the office today for follow up. Patient has a past medical history of HTN, CVA, HLD, anemia. Recently presented to the ED in January with constipation and abdominal pain. CT AP showed increased gas and stool throughout the colon. Incidentally found to have several multiple enlarged inguinal nodes. She was also found to have several stable intrahepatic hypodensities likely to be simple cysts. Focal groundglass infiltrate at right lung base. HTN : She is prescribed amlodipine and lisinopril. Denies any chest pain, palpitations, headaches or dizziness. CVA: She had a stroke in 2011. She has Rt sided residual deficit -with right arm contracture. She uses a cane to ambulate. Recently she has been having increased difficulty with bathing herself. Patient also experiencing difficulty walking with gait imbalance. Seizures: Patient is taking Keppra. No recent seizures. Hiatal hernia: Patient was noted to have anemia and hiatal hernia. Taking a PPI. She presented to ED in 9/22 for epigastric pain. Noted to have positive FIT in 12/21. She has burning epigastric pain despite taking PPI. No difficulty swallowing. Denies any dark or bloody stool. He had an EGD on 4/19/2023 which showed benign-appearing esophageal stenosis subsequently dilated. She was supposed to have a repeat dilation in 4 weeks but procedure was not completed. She is now being scheduled for an EGD on 7/19/2023. Constipation: Patient is following with GI. She was prescribed Linzess but did not start taking the prescription-it was resent and she was started on MiraLAX 1 capful twice daily.   She is being scheduled for a

## 2023-07-09 ENCOUNTER — HOME HEALTH ADMISSION (OUTPATIENT)
Age: 58
End: 2023-07-09
Payer: COMMERCIAL

## 2023-07-10 ENCOUNTER — HOME CARE VISIT (OUTPATIENT)
Age: 58
End: 2023-07-10
Payer: COMMERCIAL

## 2023-07-10 VITALS
TEMPERATURE: 98 F | OXYGEN SATURATION: 97 % | HEART RATE: 63 BPM | RESPIRATION RATE: 15 BRPM | SYSTOLIC BLOOD PRESSURE: 132 MMHG | DIASTOLIC BLOOD PRESSURE: 70 MMHG

## 2023-07-10 PROCEDURE — G0299 HHS/HOSPICE OF RN EA 15 MIN: HCPCS

## 2023-07-10 ASSESSMENT — ENCOUNTER SYMPTOMS
DYSPNEA ACTIVITY LEVEL: AFTER AMBULATING MORE THAN 20 FT
CONSTIPATION: 1

## 2023-07-10 NOTE — HOME HEALTH
to whistle or gently flicker the flame of a candle. Breathe out ( exhale ) slowly and gently through your pursedlips while counting to four. It may help to count to yourself: exhale, one, two, three, four. Pt/Caregiver instructed on plan of care and are agreeable to plan of care at this time. Physician Dr. Dipak Jane notified of patient admission to home health and plan of care including anticipated frequency of 1w4 and treatments/interventions/modalities of pt/ot/slp. Discharge planning discussed with patient and caregiver. Discharge planning as follows: when goals met, patient/caregiver independent with medication. Pt/Caregiver did verbalize understanding of discharge planning. Next MD appointment To be set by patient. Patient/caregiver encouraged/instructed to keep appointment as lack of follow through with physician appointment could result in discontinuation of home care services for non-compliance.

## 2023-07-11 ENCOUNTER — TELEPHONE (OUTPATIENT)
Facility: CLINIC | Age: 58
End: 2023-07-11

## 2023-07-11 ENCOUNTER — HOME CARE VISIT (OUTPATIENT)
Age: 58
End: 2023-07-11
Payer: COMMERCIAL

## 2023-07-11 VITALS
RESPIRATION RATE: 16 BRPM | OXYGEN SATURATION: 99 % | HEART RATE: 97 BPM | TEMPERATURE: 98.5 F | SYSTOLIC BLOOD PRESSURE: 116 MMHG | DIASTOLIC BLOOD PRESSURE: 72 MMHG

## 2023-07-11 PROCEDURE — G0151 HHCP-SERV OF PT,EA 15 MIN: HCPCS

## 2023-07-11 NOTE — HOME HEALTH
PT INITIAL EVALUATION    Past Medical Hx:   Essential (primary) hypertension      Breast cancer screening by mammogram      Constipation, unspecified constipation type      Mixed hyperlipidemia      Hemiplegia and hemiparesis following cerebral infarction affecting unspecified side (HCC)      Unsteady gait      Convulsions, unspecified convulsion type (HCC)      Prediabetes      History of CVA (cerebrovascular accident)      Contracture, right hand      Hypokalemia      G astroesophageal reflux disease without esophagitis   Recent H/o current illness:  59 year old female presents with MD referral for HHPT s/p MD visit due to CVA  Medication Management: daughter manages medications  Social hx and home eval:  Pt lives in single story home with daughter. Caregiver Involvement: assist with ADL'sm medical appointments  PLOF:  Pt PLOF is ambulation w QC, pts base level of function independent with ADL's  BALANCE:     Seated unsupported balance is good   Standing static balance is fair  Standing dynamic balance is fair  Tinetti 18 /28    Patient is at risk for falls due to CVA  BLE Strength:  Left Hip flexion 4-/5 , hip abduction 4-/5, hip adduction 4/5, Knee flexion 4/5  knee extension 4/5, ankle dorsiflexion 4/5  Right Hip flexion 3+/5 , hip abduction 3+/5, hip adduction 3+/5, Knee flexion 3+/5  knee extension 3+/5, ankle dorsiflexion 0/5  BLE ROM:  Right hip/knee: WFL   ankle contracture -30 degrees  Left hip/knee/ankle: WFL  Bed mobility:  independent   Transfers:  CGA with sit<->stand for bed to chair, with QC AD. min cues and instruction needed for safety and coordination  GAIT:  Patient ambulated  50 ft  with QC  on level  surfaces min A. Pt demonstrates with decreased hip and knee flexion on RLE in pre and mid swing phase of gait as well as decreased stride-length and bianca. Patient is unable to safely ambulate without assistance at this time.     Pt required min cues for  safety and sequencing  Stairs: 4

## 2023-07-11 NOTE — TELEPHONE ENCOUNTER
I returned a call to [address] [place] [address] and she was requesting order for Speech Therapy VOB was given.

## 2023-07-12 ENCOUNTER — HOME CARE VISIT (OUTPATIENT)
Age: 58
End: 2023-07-12
Payer: COMMERCIAL

## 2023-07-12 VITALS
OXYGEN SATURATION: 98 % | HEART RATE: 97 BPM | SYSTOLIC BLOOD PRESSURE: 130 MMHG | TEMPERATURE: 98.2 F | RESPIRATION RATE: 16 BRPM | DIASTOLIC BLOOD PRESSURE: 82 MMHG

## 2023-07-12 PROCEDURE — G0153 HHCP-SVS OF S/L PATH,EA 15MN: HCPCS

## 2023-07-12 ASSESSMENT — ENCOUNTER SYMPTOMS: PAIN LOCATION - PAIN QUALITY: PULSING

## 2023-07-13 ENCOUNTER — HOME CARE VISIT (OUTPATIENT)
Age: 58
End: 2023-07-13
Payer: COMMERCIAL

## 2023-07-13 VITALS
SYSTOLIC BLOOD PRESSURE: 140 MMHG | HEART RATE: 100 BPM | RESPIRATION RATE: 16 BRPM | OXYGEN SATURATION: 99 % | DIASTOLIC BLOOD PRESSURE: 85 MMHG | TEMPERATURE: 97.7 F

## 2023-07-13 PROCEDURE — G0157 HHC PT ASSISTANT EA 15: HCPCS

## 2023-07-17 ENCOUNTER — HOME CARE VISIT (OUTPATIENT)
Age: 58
End: 2023-07-17
Payer: COMMERCIAL

## 2023-07-17 VITALS
SYSTOLIC BLOOD PRESSURE: 134 MMHG | TEMPERATURE: 98.3 F | HEART RATE: 73 BPM | RESPIRATION RATE: 18 BRPM | DIASTOLIC BLOOD PRESSURE: 80 MMHG | OXYGEN SATURATION: 99 %

## 2023-07-17 PROCEDURE — G0299 HHS/HOSPICE OF RN EA 15 MIN: HCPCS

## 2023-07-17 ASSESSMENT — ENCOUNTER SYMPTOMS: DYSPNEA ACTIVITY LEVEL: AFTER AMBULATING MORE THAN 20 FT

## 2023-07-17 NOTE — HOME HEALTH
management, continue following prescribed diet regimen. Re-instructed on infection control measures and practicing standard precautions, most importantly, frequent proper hand washing to prevent disease complications. Continued need for the following skills: Nursing, Physical Therapy, Occupational Therapy and Speech Language Pathology. Plan for next visit: skilled ongoing assessement, medication management, education    Patient and/or caregiver notified and agrees to changes in the Plan of Care: N/A. The following discharge planning was discussed with the pt/caregiver: when goals met, patient/caregiver independent with medication.

## 2023-07-18 ENCOUNTER — TELEPHONE (OUTPATIENT)
Facility: CLINIC | Age: 58
End: 2023-07-18

## 2023-07-18 ENCOUNTER — HOME CARE VISIT (OUTPATIENT)
Age: 58
End: 2023-07-18
Payer: COMMERCIAL

## 2023-07-18 DIAGNOSIS — R00.0 TACHYCARDIA: Primary | ICD-10-CM

## 2023-07-18 PROCEDURE — G0157 HHC PT ASSISTANT EA 15: HCPCS

## 2023-07-18 NOTE — TELEPHONE ENCOUNTER
[address] [place] [address], called requesting a return phone call regard ding patient. Stated patients was averaging a heart rate 114-116 BPM with no other symptoms. Please advise and follow josé miguel Galeana- [phone]

## 2023-07-19 VITALS
OXYGEN SATURATION: 93 % | DIASTOLIC BLOOD PRESSURE: 75 MMHG | SYSTOLIC BLOOD PRESSURE: 115 MMHG | TEMPERATURE: 98 F | HEART RATE: 114 BPM | RESPIRATION RATE: 16 BRPM

## 2023-07-19 NOTE — HOME HEALTH
SUBJECTIVE: Patient reported feeling a little tired this morning and has not been exercising. CAREGIVER INVOLVEMENT/ASSISTANCE NEEDED FOR: Patient's son or daughter-in-law assists with transportation, ADLS, and IADLS. HOME HEALTH SUPPLIES BY TYPE AND QUANTITY ORDERED/DELIVERED THIS VISIT INCLUDE: None needed for this visit. OBJECTIVE:  See interventions. PATIENT RESPONSE TO TREATMENT: Patient reported feeling tired after walking and did not want to exercise. PATIENT LEVEL OF UNDERSTANDING OF EDUCATION PROVIDED: Patient verbalized understanding on fall prevention and pharmacological pain management techniques. ASSESSMENT OF PROGRESS TOWARD GOALS: Patient addressed goals for gait, transfers, and strength. Patient continues to need SBA to ambulate safely for gait training today. Patient ambulated with decreased bianca, decreased foot clearance, and decreased step length. Gave multiple vc's for pt to lift B foot higher to clear floor safely and to take longer steps. Pt needs reinforcement for safety with gait and to improve gait mechanics. Pt also continues to need SBA to perform sit < > stand transfers for transfer training from her bed. Gave vc's for pt to lean forward and to push off using B UE to stand up. Pt needs reinforcement to improve transfer technique. In addition, pt did not perform therapeutic exercises due to tachycardia. Advised to call 911 if any symptoms arise. CONTINUED NEED FOR THE FOLLOWING SKILLS: Gait Training, Stairs Training, Transfer Training, [employer], Balance Training, ROM, and Therapeutic Exercises. PLAN FOR NEXT VISIT: Patient will be seen 1w1 2w1 1w1 to increase safety with gait, to increase safety with stairs, to improve transfer technique, to improve bed mobility technique, to improve balance, and to increase strength of B LE. DISCHARGE PLANNING DISCUSSED WITH PATIENT/CAREGIVER: Discharge patient to family with MD supervision when all goals are met.  Pt/Caregiver did

## 2023-07-20 ENCOUNTER — HOME CARE VISIT (OUTPATIENT)
Age: 58
End: 2023-07-20
Payer: COMMERCIAL

## 2023-07-20 DIAGNOSIS — Z86.73 HISTORY OF CVA (CEREBROVASCULAR ACCIDENT): Primary | ICD-10-CM

## 2023-07-20 RX ORDER — CLOPIDOGREL BISULFATE 75 MG/1
75 TABLET ORAL DAILY
Qty: 90 TABLET | Refills: 1 | Status: SHIPPED | OUTPATIENT
Start: 2023-07-20

## 2023-07-20 NOTE — CASE COMMUNICATION
thank you!  ----- Message -----  From: Vasile Edwards, PTA  Sent: 7/19/2023  10:47 AM EDT  To: Marcus Hernandez MD, Denise Adjutant Dr. Shari Tan office to advise of tachycardia. Pt's HR ranged from 114-116 thiw visit. She had no associated symptoms for stroke but did feel tired after walking. Spoke with Sarah and carlos alberto nino.

## 2023-07-20 NOTE — TELEPHONE ENCOUNTER
Requested Prescriptions     Pending Prescriptions Disp Refills    clopidogrel (PLAVIX) 75 MG tablet 30 tablet      Sig: Take 1 tablet by mouth daily

## 2023-07-21 ENCOUNTER — HOME CARE VISIT (OUTPATIENT)
Age: 58
End: 2023-07-21
Payer: COMMERCIAL

## 2023-07-21 VITALS
DIASTOLIC BLOOD PRESSURE: 82 MMHG | HEART RATE: 95 BPM | OXYGEN SATURATION: 97 % | TEMPERATURE: 97.9 F | SYSTOLIC BLOOD PRESSURE: 120 MMHG | RESPIRATION RATE: 16 BRPM

## 2023-07-21 PROCEDURE — G0153 HHCP-SVS OF S/L PATH,EA 15MN: HCPCS

## 2023-07-24 ENCOUNTER — HOME CARE VISIT (OUTPATIENT)
Age: 58
End: 2023-07-24
Payer: COMMERCIAL

## 2023-07-24 VITALS
OXYGEN SATURATION: 97 % | HEART RATE: 71 BPM | SYSTOLIC BLOOD PRESSURE: 126 MMHG | TEMPERATURE: 98.7 F | RESPIRATION RATE: 16 BRPM | DIASTOLIC BLOOD PRESSURE: 66 MMHG

## 2023-07-24 VITALS
OXYGEN SATURATION: 96 % | HEART RATE: 98 BPM | TEMPERATURE: 98 F | SYSTOLIC BLOOD PRESSURE: 120 MMHG | DIASTOLIC BLOOD PRESSURE: 73 MMHG | RESPIRATION RATE: 16 BRPM

## 2023-07-24 DIAGNOSIS — Z86.73 HISTORY OF CVA (CEREBROVASCULAR ACCIDENT): ICD-10-CM

## 2023-07-24 PROCEDURE — G0299 HHS/HOSPICE OF RN EA 15 MIN: HCPCS

## 2023-07-24 PROCEDURE — G0152 HHCP-SERV OF OT,EA 15 MIN: HCPCS

## 2023-07-24 ASSESSMENT — ENCOUNTER SYMPTOMS
PAIN LOCATION - PAIN QUALITY: ACHING
CONSTIPATION: 1
TROUBLE SWALLOWING: 1
DYSPNEA ACTIVITY LEVEL: AFTER AMBULATING MORE THAN 20 FT

## 2023-07-24 NOTE — HOME HEALTH
Clinical Condition per EPIC: Recent H/o current illness: 59year old female presents with MD referral for [facility] s/p MD visit due to CVA    Past Medical Hx: Essential (primary) hypertension Breast cancer screening by mammogram constipation, unspecified constipation type Mixed hyperlipidemia Hemiplegia and hemiparesis following cerebral infarction affecting unspecified side ([address]) Unsteady gait Convulsions, unspecified convulsion type ([address]) Prediabetes History of CVA (cerebrovascular accident) Contracture, right hand Hypokalemia    Caregiver: Pt family assists ADL's, IADLs, medications, and transportation. PLOF: She was ambulating with the support of a quad cane and was independent with ADLs     Medications: Pt reports no changes to medications since last reviewed and educated to continue as directed per MD.    SUBJECTIVE: Daughter present throughout. Pt reports minor pain in L leg. DME ORDERED/RECOMMENEDED: palm guard, tub transfer bench, button hook    OBJECTIVE:  BATHING: Pt currently sponge bathes with minimal assistance. Pt currently living in [facility] and not able to fit tub transfer bench into bathtub with glass doors. TOILETING: Pt supervision for toileting using standard toilet   UB DRESSING: Pt setup assistance with don/doff upper body clothing. Pt educated on using button hook for independence with don/doff shits with buttons  LB DRESSING: Pt setup assistance with don/doff pants, socks, and shoes with elastic waists only. UNable to manipulate clothing fasteners for shoes and pants. GROOMING: Pt set up for grooming tasks such brushing hair. She has dentures, however does not wear them. Pt rinses mouth out with mouthwash. FEEDING: Pt set up for self feeding. Pt educated on using RUE to hold bowl/plate in place for stabilization. LUE MMT: 4+/5 RUE MMT: 2-/5    LUE ROM: WFL.  RUE ROM: Contracture noted in R wrist and elbow greatly effecting ROM needed for functional tasks    VISION: Pt vision is [facility]

## 2023-07-24 NOTE — HOME HEALTH
Skilled reason for visit: skilled assessment, education, medication management  Caregiver involvement: Family assists ADL's, medications, meals, transportation, errands. Medications reviewed and all medications are available in the home this visit. The following education was provided regarding medications: patient knowledgeable about all medications, has no questions or concerns at this time. .   MD notified of any discrepancies/look a-like medications/medication interactions: na   Medications are effective at this time. Home health supplies by type and quantity ordered/delivered this visit include: na   Patient education provided this visit: See careplan   Sharps education provided: na   Patient level of understanding of education provided: patient/caregiver verbalized 100% understanding.    Skilled Care Performed this visit: skilled assessment, education, medication management   Patient response to procedure performed: patient denied pain and discomfort during procedure/visit   Agency Progress toward goals: progressing   Patient's Progress towards personal goals: progressing   Home exercise program: See careplan   Continued need for the following skills: Nursing, pt, slp, ot  Plan for next visit: skilled assessment, education, medication management   Patient and/or caregiver notified and agrees to changes in the Plan of Care YES/NO/NA: N/A   The following discharge planning was discussed with the pt/caregiver: Discharge planning as follows: when goals met, patient/caregiver able to manage disease process, medications and pain

## 2023-07-25 RX ORDER — CLOPIDOGREL BISULFATE 75 MG/1
TABLET ORAL
Qty: 30 TABLET | Refills: 5 | OUTPATIENT
Start: 2023-07-25

## 2023-07-26 ENCOUNTER — HOME CARE VISIT (OUTPATIENT)
Age: 58
End: 2023-07-26
Payer: COMMERCIAL

## 2023-07-26 PROCEDURE — G0153 HHCP-SVS OF S/L PATH,EA 15MN: HCPCS

## 2023-07-26 PROCEDURE — G0157 HHC PT ASSISTANT EA 15: HCPCS

## 2023-07-27 ENCOUNTER — HOME CARE VISIT (OUTPATIENT)
Age: 58
End: 2023-07-27
Payer: COMMERCIAL

## 2023-07-27 ENCOUNTER — TELEPHONE (OUTPATIENT)
Facility: CLINIC | Age: 58
End: 2023-07-27

## 2023-07-27 VITALS
OXYGEN SATURATION: 97 % | RESPIRATION RATE: 16 BRPM | TEMPERATURE: 97.4 F | SYSTOLIC BLOOD PRESSURE: 105 MMHG | DIASTOLIC BLOOD PRESSURE: 65 MMHG | HEART RATE: 106 BPM

## 2023-07-27 DIAGNOSIS — M24.541 CONTRACTURE, RIGHT HAND: ICD-10-CM

## 2023-07-27 DIAGNOSIS — K59.00 CONSTIPATION, UNSPECIFIED CONSTIPATION TYPE: Primary | ICD-10-CM

## 2023-07-27 DIAGNOSIS — Z86.73 HISTORY OF CVA (CEREBROVASCULAR ACCIDENT): ICD-10-CM

## 2023-07-27 DIAGNOSIS — I69.359 HEMIPLEGIA AND HEMIPARESIS FOLLOWING CEREBRAL INFARCTION AFFECTING UNSPECIFIED SIDE (HCC): ICD-10-CM

## 2023-07-27 DIAGNOSIS — R26.81 UNSTEADY GAIT: ICD-10-CM

## 2023-07-27 PROCEDURE — G0157 HHC PT ASSISTANT EA 15: HCPCS

## 2023-07-27 RX ORDER — LACTULOSE 10 G/15ML
10 SOLUTION ORAL 3 TIMES DAILY PRN
Qty: 237 ML | Refills: 1 | Status: SHIPPED | OUTPATIENT
Start: 2023-07-27

## 2023-07-27 NOTE — HOME HEALTH
technique, to improve bed mobility technique, to improve balance, and to increase strength of B LE. DISCHARGE PLANNING DISCUSSED WITH PATIENT/CAREGIVER: Discharge patient to family with MD supervision when all goals are met. Pt/Caregiver did verbalize understanding of discharge planning.

## 2023-07-27 NOTE — TELEPHONE ENCOUNTER
Spoke to home health over the phone. Patient continues to complain of constipation and has not had a BM in a few days. She has been prescribed Linzess by GI which she has been taking. She is also on senna/Colace. Will prescribe lactulose to take    Patient will be discharged from home health PT.   Will refer for outpatient PT.

## 2023-07-28 ENCOUNTER — HOME CARE VISIT (OUTPATIENT)
Age: 58
End: 2023-07-28
Payer: COMMERCIAL

## 2023-07-28 VITALS
DIASTOLIC BLOOD PRESSURE: 85 MMHG | OXYGEN SATURATION: 98 % | SYSTOLIC BLOOD PRESSURE: 100 MMHG | TEMPERATURE: 97.7 F | HEART RATE: 100 BPM

## 2023-07-28 VITALS
TEMPERATURE: 97 F | RESPIRATION RATE: 16 BRPM | SYSTOLIC BLOOD PRESSURE: 110 MMHG | HEART RATE: 104 BPM | OXYGEN SATURATION: 98 % | DIASTOLIC BLOOD PRESSURE: 78 MMHG

## 2023-07-28 PROCEDURE — G0158 HHC OT ASSISTANT EA 15: HCPCS

## 2023-07-28 NOTE — HOME HEALTH
SUBJECTIVE:   Ladonna sates she is doing well, denying any difficulty. CAREGIVER INVOLVEMENT / ASSISTANCE NEEDED FOR: proivde assistance with medications, meals, and ADLS       HOME HEALTH SUPPLIES BY TYPE AND QUANTITY ORDERED/DELIVERED THIS VISIT INCLUDE: N/A         OBJECTIVE / PATIENT RESPONSE TO TREATMENT / PATIENT LEVEL OF UNDERSTANDING OF EDUCATION PROVIDED: see interventions         ASSESSMENT OF PROGRESS TOWARD GOALS: Pt is making appropriate progress toward goals as indicated by good recall of unrleated information with a delay. Patient continues to require speech therapy services to target executive functioning, word retrieval, and cognition to improve independence in daily cognitive communication tasks.          CONTINUED NEED FOR THE FOLLOWING SKILLS: Pt presents with mild cognitive lingustic deifict         PLAN FOR NEXT VISIT: continue ST POC       THE FOLLOWING DISCHARGE PLANNING WAS DISCUSSED WITH THE PATIENT/CAREGIVER: ST to d/c in 2 more visits/ wks or when goals met/max potential achieved, Pt/caregiver verbalized understanding

## 2023-07-28 NOTE — HOME HEALTH
SUBJECTIVE: Patient reported feeling good today and had no c/o pain. CAREGIVER INVOLVEMENT/ASSISTANCE NEEDED FOR: Patient's son or daughter-in-law assists with transportation, ADLS, and IADLS. HOME HEALTH SUPPLIES BY TYPE AND QUANTITY ORDERED/DELIVERED THIS VISIT INCLUDE: None needed for this visit. OBJECTIVE:  See interventions. PATIENT RESPONSE TO TREATMENT: Patient felt good after walking and negotiating stairs. PATIENT LEVEL OF UNDERSTANDING OF EDUCATION PROVIDED: Patient verbalized understanding on fall prevention and pharmacological pain management techniques. ASSESSMENT OF PROGRESS TOWARD GOALS: Patient addressed goals for gait, stairs, and transfers. Patient previously needed Supervision to ambulate safely but progressed to Mod I for gait training today inside her home. Patient ambulated with improved stability and demonstrated good safety awareness. Pt also previously needed Supervision to go up and down stairs but progressed to Mod I for stairs training today. Pt led with her R LE to go up and down stairs. She demonstrated good safety awareness with stairs negotiation. In addition, pt previously needed SBA to perform sit < > stand transfers but progressed to Mod I for transfer training today. Pt demonstrated good technique and improved ability to perform transfers independently. Moreover, patient demonstrated improved balance as evidence by improving his Tinetti balance assessment score from 18/28 from PT initial evaluation to 20/28 today. Discussed with patient/CG plan to discharge pt from [facility] PT services next week. Pt/CG verbalized understanding and is in agreement with discharge plan. CONTINUED NEED FOR THE FOLLOWING SKILLS: Gait Training, Stairs Training, Transfer Training, [employer], Balance Training, ROM, and Therapeutic Exercises.   PLAN FOR NEXT VISIT: Patient will be seen 1w1 2w1 1w1 to increase safety with gait, to increase safety with stairs, to improve transfer technique, to

## 2023-07-29 VITALS
TEMPERATURE: 97.3 F | DIASTOLIC BLOOD PRESSURE: 85 MMHG | OXYGEN SATURATION: 100 % | SYSTOLIC BLOOD PRESSURE: 111 MMHG | HEART RATE: 94 BPM | RESPIRATION RATE: 16 BRPM

## 2023-07-29 ASSESSMENT — ENCOUNTER SYMPTOMS: PAIN LOCATION - PAIN QUALITY: ACHING

## 2023-07-29 NOTE — HOME HEALTH
SUBJECTIVE: Patient reports she has not had an BM within the last two days. [facility] CAREGIVER INVOLVEMENT/ASSISTANCE NEEDED FOR: The pt family helps with all I/ADLS due to her inability to do so. [facility] HOME HEALTH SUPPLIES BY TYPE AND QUANTITY ORDERED/DELIVERED THIS VISIT INCLUDE: NONE   . OBJECTIVE:  See interventions. .  Patient education provided this visit: SIBLEY ROLE, Energy conservation techniques,and Purse-lip breathing techniques. .    Patient level of understanding of education provided:   was able to teach-back education provided from 606/706 Brendan Parsons requiring two verbal cuing form SIBLEY for reenforcement of safety to prevent confusion. .  RESPONSE TO TREATMENT: Pt reported an 6/10 on numeric scale after performing IADL's and balance retraining with use of energy conservation techniques. Pt required two periods of rest while performing functional mobility tasks due to exertion when performing functional activity. (Please see interventions for more details of goal previously mentioned)   . ASSESSMENT OF PROGRESS TOWARD GOALS((Please see interventions for more details)): IADLS: Client able to enage in IADL retraining(Cleaning counter-top in k[place] and washing dishes and putting them away ) for an duration of 6 mins  30 secs while dynamic standing with use of guad gane /SUP-SBA with fair- balancd. The pt did experience periods of LOB (2 times) ,How the pt was able to regain with min v/c's for proper body alignment and correct trunk control. SIBLEY provided moderate verbal cuing for purse-lip breathing exercises to prevent exertion when performing functional tasks. The pt able to verbalize and demo  energy conservation techniques when performing I/ADL tasks such as sitting down when performing bathing and dressing tasks.  sitting down when performing meals, pacing oneself when performing a functional activity to prevent exertion, allowing adequate periods of rest after a tasks, sitting down when

## 2023-07-30 ENCOUNTER — HOSPITAL ENCOUNTER (EMERGENCY)
Facility: HOSPITAL | Age: 58
Discharge: HOME | End: 2023-07-30
Attending: STUDENT IN AN ORGANIZED HEALTH CARE EDUCATION/TRAINING PROGRAM

## 2023-07-30 VITALS
DIASTOLIC BLOOD PRESSURE: 84 MMHG | BODY MASS INDEX: 25.71 KG/M2 | RESPIRATION RATE: 12 BRPM | WEIGHT: 160 LBS | SYSTOLIC BLOOD PRESSURE: 115 MMHG | HEIGHT: 66 IN | HEART RATE: 84 BPM | OXYGEN SATURATION: 98 % | TEMPERATURE: 97.3 F

## 2023-07-30 DIAGNOSIS — N30.01 ACUTE CYSTITIS WITH HEMATURIA: Primary | ICD-10-CM

## 2023-07-30 LAB
APPEARANCE UR: ABNORMAL
BACTERIA URNS QL MICRO: ABNORMAL /HPF
BILIRUB UR QL: ABNORMAL
COLOR UR: YELLOW
EPITH CASTS URNS QL MICRO: ABNORMAL /LPF (ref 0–5)
GLUCOSE UR STRIP.AUTO-MCNC: NEGATIVE MG/DL
HGB UR QL STRIP: ABNORMAL
KETONES UR QL STRIP.AUTO: ABNORMAL MG/DL
LEUKOCYTE ESTERASE UR QL STRIP.AUTO: ABNORMAL
NITRITE UR QL STRIP.AUTO: NEGATIVE
OTHER: ABNORMAL
PH UR STRIP: 5.5 (ref 5–8)
PROT UR STRIP-MCNC: 100 MG/DL
RBC #/AREA URNS HPF: ABNORMAL /HPF (ref 0–5)
SERVICE CMNT-IMP: NORMAL
SP GR UR REFRACTOMETRY: 1.02 (ref 1–1.03)
UROBILINOGEN UR QL STRIP.AUTO: 0.2 EU/DL (ref 0.2–1)
WBC URNS QL MICRO: ABNORMAL /HPF (ref 0–4)
WET PREP GENITAL: NORMAL

## 2023-07-30 PROCEDURE — 99283 EMERGENCY DEPT VISIT LOW MDM: CPT

## 2023-07-30 PROCEDURE — 87086 URINE CULTURE/COLONY COUNT: CPT

## 2023-07-30 PROCEDURE — 81001 URINALYSIS AUTO W/SCOPE: CPT

## 2023-07-30 PROCEDURE — 87210 SMEAR WET MOUNT SALINE/INK: CPT

## 2023-07-30 RX ORDER — CEPHALEXIN 500 MG/1
500 CAPSULE ORAL 2 TIMES DAILY
Qty: 14 CAPSULE | Refills: 0 | Status: SHIPPED | OUTPATIENT
Start: 2023-07-30 | End: 2023-08-06

## 2023-07-30 ASSESSMENT — ENCOUNTER SYMPTOMS
COUGH: 0
SHORTNESS OF BREATH: 0
ABDOMINAL PAIN: 0
CHEST TIGHTNESS: 0
NAUSEA: 0
DIARRHEA: 0
CONSTIPATION: 0
VOMITING: 0

## 2023-07-30 NOTE — ED NOTES
The following labs were labeled with appropriate pt sticker and tubed to lab:     [] Blue     [] Lavender   [] on ice  [] Green/yellow  [] Green/black [] on ice  [] Tenna Uriel  [] on ice  [] Yellow  [] Red  [] Type/ Screen  [] ABG  [] VBG    [] COVID-19 swab    [] Rapid  [] PCR  [] Flu swab  [] Peds Viral Panel     [] Urine Sample  [] Fecal Sample  [x] Pelvic Cultures  [] Blood Cultures  [] X 2  [] STREP Cultures       Marianne Alcala RN  07/30/23 3852

## 2023-07-30 NOTE — ED NOTES
Patient noted stable and presenting in no acute distress. Discharge instructions and medication list reviewed (as required) with the patient. All questions and concerns were addressed at this time, and the patient expresses understanding. Patient released with vitals noted as recorded.         Rula Hector RN  07/30/23 0438

## 2023-07-30 NOTE — ED NOTES
The following labs were labeled with appropriate pt sticker and tubed to lab:     [] Blue     [] Lavender   [] on ice  [] Green/yellow  [] Green/black [] on ice  [] Cyrus Raider  [] on ice  [] Yellow  [] Red  [] Type/ Screen  [] ABG  [] VBG    [] COVID-19 swab    [] Rapid  [] PCR  [] Flu swab  [] Peds Viral Panel     [x] Urine Sample  [] Fecal Sample  [] Pelvic Cultures  [] Blood Cultures  [] X 2  [] STREP Cultures       Doree Gilford, RN  07/30/23 8105

## 2023-07-30 NOTE — ED TRIAGE NOTES
Patient presents to the ED for dysuria and a lump on her chest (Closer to under her arm). Patient states that she has been using a cream for a yeast infection x7 days, however, she continues to have pain at her vaginal region. Pt also states that she was evaluated here before for the lump under her arm.

## 2023-07-30 NOTE — ED NOTES
Lab swabs mis-handled/ misplaced by lab. They are not matthew to locate when writer called to inquire bout results. Informed that \"day shift\" accepted the sample, and that current shift cannot locate.       Kayren Lundborg, RN  07/30/23 4779

## 2023-07-31 ENCOUNTER — HOME CARE VISIT (OUTPATIENT)
Age: 58
End: 2023-07-31
Payer: COMMERCIAL

## 2023-07-31 VITALS
OXYGEN SATURATION: 98 % | TEMPERATURE: 98 F | RESPIRATION RATE: 17 BRPM | HEART RATE: 86 BPM | SYSTOLIC BLOOD PRESSURE: 132 MMHG | DIASTOLIC BLOOD PRESSURE: 86 MMHG

## 2023-07-31 LAB
BACTERIA SPEC CULT: NORMAL
SERVICE CMNT-IMP: NORMAL

## 2023-07-31 PROCEDURE — G0158 HHC OT ASSISTANT EA 15: HCPCS

## 2023-07-31 NOTE — CASE COMMUNICATION
thank you for the updates!  ----- Message -----  From: Ilda Casiano PTA  Sent: 7/28/2023   8:03 AM EDT  To: Nilay Isabel MD, Cathie Best OT, Paige Sanchez MD office and spoke with Dr. Xiomara Ascencio to update patient status. Patient still presented with tachycardia this week with readings ranging from 104 - 106 bpm. Patient also has not had a bowel movement in 6 days. Dr. Xiomara Ascencio stated she will call in an order to pt's pharmacy to he lp with constipation. In addition, advised Dr. Xiomara Ascencio that [facility] PT services will end next week and requested order for Outpatient PT for patient. supervision/verbal cues/1 person assist

## 2023-07-31 NOTE — HOME HEALTH
SUBJECTIVE: Patient reports she has been performing house-hold chrores with use of energy conservation techniques that SIBLEY provided. Gabriel Braxton CAREGIVER INVOLVEMENT/ASSISTANCE NEEDED FOR: The pt family helps with all I/ADLS due to her inability to do so. Gabriel Braxton HOME HEALTH SUPPLIES BY TYPE AND QUANTITY ORDERED/DELIVERED THIS VISIT INCLUDE: NONE     . OBJECTIVE:  See interventions. .    Patient education provided this visit: SIBLEY ROLE, Energy conservation techniques,and Purse-lip breathing techniques. .         Patient level of understanding of education provided:   was able to teach-back education provided from 606/706 Brendan Parsons requiring two verbal cuing form SIBLEY for reenforcement of safety to prevent confusion. .    RESPONSE TO TREATMENT: Pt reported an 6/10 on numeric scale after performing IADL's retraining and optimal level of function with use of energy conservation techniques. Pt required two periods of rest while performing functional mobility tasks due to exertion when performing functional activity. (Please see interventions for more details of goal previously mentioned)     . ASSESSMENT OF PROGRESS TOWARD GOALS((Please see interventions for more details))SIBLEY modeled dynamic standing  balance retraining at sink-level  to improve equilibrium while performing ADLS. SIBLEY provided verbal cuing for safety such as correct hand placement on bed while reaching out on ipsilateral side, placing hands on armrest of chair for stability of chair, proper body alignment and trunk control, for correct posture due to the inability of client weight shifting on BLE . In return, client able to demo dynamic seated balance tasks  for two intervals of 2 min's and 10 sec's and 3min's and 15 sec's with Min A x1  with no periods of LOB with Fair- balance. Pt perform AROM and PROM on R Wrist. Pt performed R Wrist exercises such as(palm up and palm down, bend wrist backwards and forward-  While

## 2023-08-01 ENCOUNTER — HOSPITAL ENCOUNTER (OUTPATIENT)
Facility: HOSPITAL | Age: 58
Setting detail: SPECIMEN
Discharge: HOME | End: 2023-08-04
Payer: COMMERCIAL

## 2023-08-01 DIAGNOSIS — E87.6 HYPOKALEMIA: ICD-10-CM

## 2023-08-01 DIAGNOSIS — R73.03 PREDIABETES: ICD-10-CM

## 2023-08-01 DIAGNOSIS — E78.2 MIXED HYPERLIPIDEMIA: ICD-10-CM

## 2023-08-01 LAB
ANION GAP SERPL CALC-SCNC: 4 MMOL/L (ref 3–18)
BUN SERPL-MCNC: 5 MG/DL (ref 7–18)
BUN/CREAT SERPL: 6 (ref 12–20)
CALCIUM SERPL-MCNC: 9.2 MG/DL (ref 8.5–10.1)
CHLORIDE SERPL-SCNC: 105 MMOL/L (ref 100–111)
CHOLEST SERPL-MCNC: 84 MG/DL
CO2 SERPL-SCNC: 29 MMOL/L (ref 21–32)
CREAT SERPL-MCNC: 0.82 MG/DL (ref 0.6–1.3)
EST. AVERAGE GLUCOSE BLD GHB EST-MCNC: 117 MG/DL
GLUCOSE SERPL-MCNC: 103 MG/DL (ref 74–99)
HBA1C MFR BLD: 5.7 % (ref 4.2–5.6)
HDLC SERPL-MCNC: 30 MG/DL (ref 40–60)
HDLC SERPL: 2.8 (ref 0–5)
LDLC SERPL CALC-MCNC: 33.4 MG/DL (ref 0–100)
LIPID PANEL: ABNORMAL
POTASSIUM SERPL-SCNC: 3.2 MMOL/L (ref 3.5–5.5)
SODIUM SERPL-SCNC: 138 MMOL/L (ref 136–145)
TRIGL SERPL-MCNC: 103 MG/DL
VLDLC SERPL CALC-MCNC: 20.6 MG/DL

## 2023-08-01 PROCEDURE — 36415 COLL VENOUS BLD VENIPUNCTURE: CPT

## 2023-08-01 PROCEDURE — 83036 HEMOGLOBIN GLYCOSYLATED A1C: CPT

## 2023-08-01 PROCEDURE — 80048 BASIC METABOLIC PNL TOTAL CA: CPT

## 2023-08-01 PROCEDURE — 80061 LIPID PANEL: CPT

## 2023-08-02 ENCOUNTER — HOME CARE VISIT (OUTPATIENT)
Age: 58
End: 2023-08-02
Payer: COMMERCIAL

## 2023-08-02 VITALS
HEART RATE: 62 BPM | SYSTOLIC BLOOD PRESSURE: 119 MMHG | OXYGEN SATURATION: 99 % | DIASTOLIC BLOOD PRESSURE: 72 MMHG | TEMPERATURE: 97.2 F | RESPIRATION RATE: 16 BRPM

## 2023-08-02 PROCEDURE — G0151 HHCP-SERV OF PT,EA 15 MIN: HCPCS

## 2023-08-02 PROCEDURE — G0158 HHC OT ASSISTANT EA 15: HCPCS

## 2023-08-02 NOTE — HOME HEALTH
DC ACTIONS NARRATIVE   Pt. clinically discharged and documentation finalized for completion of PT discharge. Caregiver involvement: Pt daughter is primary caregiver at this time and has been assisting with medical appointments and medication management. Medications reconciled and all medications are available in the home this visit. The following education was provided regarding medications, medication interactions, and look a like medications: NA Medications are effective at this time. Home health supplies by type and quantity ordered/delivered this visit include: NA  Patient education provided this visit: safety with functional mobility  Current Functional Status and progress towards goals:   Strength: Pt. BLE strength is 3+/5 which is an improvement from the initial evaluation strength of 4-5. This allows the patient increased functional independence and mobility  BED MOBILITY: Pt. is independent with all bed mobility. TRANSFERS: Pt. is mod I with all transfers with QC AD which demonstrates an improvement from the initial evaluation score of CGA with QC AD. This allows the patient increased functional independence and mobility   GAIT/WC MOBILITY: Pt. is able to ambulate >400 feet inside over even and outside over uneven surfaces with mod I with QC AD. This represents an improvement from the initial evaluation of 50 feet with QC AD on level surfaces with min A. STAIRS: 4 stairs with SBA   BALANCE: Patient's final Tinetti is 20/28 which is an improvement from the initial evaluation score of 18/28. This allows the patient to be functionally more independent and have a decreased fall risk   Progress toward goals: Patient has met  goals, see interventions for details. Pt. was able to return demonstrate all mobility training and HEP shown independently. Patient response to treatment and education: Patient tolerated treatment good, no complaint of new pain noted post treatment.    Home exercise program:

## 2023-08-03 ENCOUNTER — HOME CARE VISIT (OUTPATIENT)
Age: 58
End: 2023-08-03
Payer: COMMERCIAL

## 2023-08-03 VITALS
TEMPERATURE: 97.3 F | RESPIRATION RATE: 16 BRPM | SYSTOLIC BLOOD PRESSURE: 100 MMHG | HEART RATE: 98 BPM | DIASTOLIC BLOOD PRESSURE: 80 MMHG | OXYGEN SATURATION: 96 %

## 2023-08-03 VITALS
DIASTOLIC BLOOD PRESSURE: 86 MMHG | RESPIRATION RATE: 17 BRPM | TEMPERATURE: 98 F | SYSTOLIC BLOOD PRESSURE: 132 MMHG | OXYGEN SATURATION: 98 % | HEART RATE: 86 BPM

## 2023-08-03 PROCEDURE — G0153 HHCP-SVS OF S/L PATH,EA 15MN: HCPCS

## 2023-08-03 NOTE — HOME HEALTH
SUBJECTIVE: Patient reports she has been performing house-hold chrores with use of energy conservation techniques that SIBLEY provided. Diego Schaffer CAREGIVER INVOLVEMENT/ASSISTANCE NEEDED FOR: The pt family helps with all I/ADLS due to her inability to do so. Diego Schaffer HOME HEALTH SUPPLIES BY TYPE AND QUANTITY ORDERED/DELIVERED THIS VISIT INCLUDE: NONE          . OBJECTIVE:  See interventions. .         Patient education provided this visit: SIBLEY ROLE, Energy conservation techniques,and Purse-lip breathing techniques. .                   Patient level of understanding of education provided:   was able to teach-back education provided from 606/706 Brendan Parsons requiring two verbal cuing form SIBLEY for reenforcement of safety to prevent confusion. .         RESPONSE TO TREATMENT: Pt reported an 6/10 on numeric scale after performing IADL's retraining and optimal level of function with use of energy conservation techniques. Pt required two periods of rest while performing functional mobility tasks due to exertion when performing functional activity. (Please see interventions for more details of goal previously mentioned)          . ASSESSMENT OF PROGRESS TOWARD GOALS((Please see interventions for more details))SIBLEY modeled dynamic standing  balance retraining at sink-level  to improve equilibrium while performing ADLS. SIBLEY provided verbal cuing for safety such as correct hand placement on bed while reaching out on ipsilateral side, placing hands on armrest of chair for stability of chair, proper body alignment and trunk control, for correct posture due to the inability of client weight shifting on BLE . In return, client able to demo dynamic seated balance tasks  for two intervals of 2 min's and 10 sec's and 3min's and 15 sec's with Min A x1  with no periods of LOB with fair+ balance. Pt perform AROM and PROM on R Wrist. Pt performed R Wrist

## 2023-08-03 NOTE — HOME HEALTH
SUBJECTIVE: \"I am just getting over a yeast infection\"    CAREGIVER INVOLVEMENT / ASSISTANCE NEEDED FOR: transportation, medication management, meal prep     OBJECTIVE / PATIENT RESPONSE TO TREATMENT / PATIENT LEVEL OF UNDERSTANDING OF EDUCATION PROVIDED: Patient has demonstrated significant improvement with implementing compensatory memory strategies, word retrieval strategies, and graded exercises to improve verbal expression and cognitive lingusitic skills. Patient demonstrated significant improvement evidenced by improvement in Andersonberg Status Examination with score of 26/30, which is improvement from initial evaluation (20/30). With SLP graded exercises and implementation of strategies, patient demonstrated improvement in calculations, generative naming, following multi-step directions and stm recall. Educated patient on discharge plans and plans to implement home maintanance program to which patient verbalized understanding. ASSESSMENT OF PROGRESS TOWARD GOALS: Patient continues to progress towards  speech therapy goals. CONTINUED NEED FOR THE FOLLOWING SKILLS: Patient continue skilled speech therapy services to implement home maintanence program for safe and sustainable discharge. PLAN FOR NEXT VISIT : Plans for discharge    HOME EXERCISE PROGRAM: Implement compensatory memory strategies     THE FOLLOWING DISCHARGE PLANNING WAS DISCUSSED WITH THE PATIENT/CAREGIVER: ST to d/c in 1 more visits/wks or when goals met/max potential achieved, Pt/caregiver verbalized understanding.

## 2023-08-03 NOTE — CASE COMMUNICATION
thank you!  ----- Message -----  From: Reginaldo Jeronimo, PT  Sent: 8/2/2023   5:23 PM EDT  To: Evy Tay MD, Nadeen Goodwin PTA, *      Ms. Elisa Elliott has been clinically discharged and documentation finalized for completion of PT discharge. Caregiver involvement: Pt daughter is primary caregiver at this time and has been assisting with medical appointments and medication management. Medications reconciled and all medicati ons are available in the home this visit. The following education was provided regarding medications, medication interactions, and look a like medications: NA Medications are effective at this time. Home health supplies by type and quantity ordered/delivered this visit include: NA  Patient education provided this visit: safety with functional mobility  Current Functional Status and progress towards goals:   Strength: Pt. BLE strength  is 3+/5 which is an improvement from the initial evaluation strength of 4-5. This allows the patient increased functional independence and mobility  BED MOBILITY: Pt. is independent with all bed mobility. TRANSFERS: Pt. is mod I with all transfers with QC AD which demonstrates an improvement from the initial evaluation score of CGA with QC AD. This allows the patient increased functional independence and mobility   GAIT/WC MOBILITY: P t. is able to ambulate >400 feet inside over even and outside over uneven surfaces with mod I with QC AD. This represents an improvement from the initial evaluation of 50 feet with QC AD on level surfaces with min A. STAIRS: 4 stairs with SBA   BALANCE: Patient's final Tinetti is 20/28 which is an improvement from the initial evaluation score of 18/28. This allows the patient to be functionally more independent and have a decreased fa ll risk   Progress toward goals: Patient has met  goals, see interventions for details. Pt. was able to return demonstrate all mobility training and HEP shown independently.  Patient

## 2023-08-04 ENCOUNTER — TELEPHONE (OUTPATIENT)
Facility: CLINIC | Age: 58
End: 2023-08-04

## 2023-08-04 DIAGNOSIS — E87.6 HYPOKALEMIA: Primary | ICD-10-CM

## 2023-08-04 RX ORDER — POTASSIUM CHLORIDE 20 MEQ/1
20 TABLET, EXTENDED RELEASE ORAL DAILY
Qty: 5 TABLET | Refills: 0 | Status: SHIPPED | OUTPATIENT
Start: 2023-08-04 | End: 2023-08-09

## 2023-08-04 NOTE — TELEPHONE ENCOUNTER
Please inform patient that her labs showed low potassium at 3.2. Her cholesterol levels are at goal.    I have prescribed potassium supplement for her.

## 2023-08-07 ENCOUNTER — HOME CARE VISIT (OUTPATIENT)
Age: 58
End: 2023-08-07
Payer: COMMERCIAL

## 2023-08-07 VITALS
TEMPERATURE: 97.7 F | OXYGEN SATURATION: 98 % | RESPIRATION RATE: 18 BRPM | DIASTOLIC BLOOD PRESSURE: 82 MMHG | SYSTOLIC BLOOD PRESSURE: 120 MMHG | HEART RATE: 102 BPM

## 2023-08-07 PROCEDURE — G0153 HHCP-SVS OF S/L PATH,EA 15MN: HCPCS

## 2023-08-07 NOTE — CASE COMMUNICATION
Patient has demonstrated significant improvement with implementing compensatory memory strategies, word retrieval strategies, and graded exercises to improve verbal expression and cognitive lingusitic skills. Patient demonstrated significant improvement evidenced by improvement in Andersonberg Status Examination with score of 26/30 (reassessment), which is improvement from initial evaluation (20/30). By implementing compensatory  memory strategies and practical applications, patient is able to demonstrate improvement in stm recall for daily appointments and conversations. Educated patient on importance of daily cognitive memory exercises to sustain progress after discharge to which patient verbalized understanding. Patient has met all  speech therapy goals and is appropriate for discharge.

## 2023-08-07 NOTE — HOME HEALTH
SUBJECTIVE: \"I hvae been practicing those memory strategies. \"    CAREGIVER INVOLVEMENT / ASSISTANCE NEEDED FOR: transportation, medication management, meal prep     OBJECTIVE / PATIENT RESPONSE TO TREATMENT / PATIENT LEVEL OF UNDERSTANDING OF EDUCATION PROVIDED: Patient has demonstrated significant improvement with implementing compensatory memory strategies, word retrieval strategies, and graded exercises to improve verbal expression and cognitive lingusitic skills. Patient demonstrated significant improvement evidenced by improvement in Andersonberg Status Examination with score of 26/30 (reassessment), which is improvement from initial evaluation (20/30). By implementing compensatory memory strategies and practical applications, patient is able to demonstrate improvement in stm recall for daily appointments and conversations. Educated patient on importance of daily cognitive memory exercises to sustain progress after discharge to which patient verbalized understanding. ASSESSMENT OF PROGRESS TOWARD GOALS:  Patient has met all  speech therapy goals and is appropriate for discharge.

## 2023-08-08 ENCOUNTER — HOME CARE VISIT (OUTPATIENT)
Age: 58
End: 2023-08-08
Payer: COMMERCIAL

## 2023-08-08 VITALS
OXYGEN SATURATION: 98 % | SYSTOLIC BLOOD PRESSURE: 132 MMHG | DIASTOLIC BLOOD PRESSURE: 86 MMHG | RESPIRATION RATE: 17 BRPM | HEART RATE: 86 BPM | TEMPERATURE: 98 F

## 2023-08-08 PROCEDURE — G0158 HHC OT ASSISTANT EA 15: HCPCS

## 2023-08-08 ASSESSMENT — ENCOUNTER SYMPTOMS: PAIN LOCATION - PAIN QUALITY: ACHING

## 2023-08-09 NOTE — HOME HEALTH
SUBJECTIVE: Patient reports pain in her abdomen an 6/10 on the pain numeric scale on 08.08.23. [facility] CAREGIVER INVOLVEMENT/ASSISTANCE NEEDED FOR: The pt family helps with all I/ADLS due to her inability to do so. [facility] HOME HEALTH SUPPLIES BY TYPE AND QUANTITY ORDERED/DELIVERED THIS VISIT INCLUDE: NONE                    . OBJECTIVE:  See interventions. .    Patient education provided this visit: Doug Galloway provided continued education on dressing with use of DME, Energy conservation techniques,and Purse-lip breathing techniques. .              Patient level of understanding of education provided:   was able to teach-back education provided from 606/706 Brendan Parsons requiring two verbal cuing form SIBLEY for reenforcement of safety to prevent confusion. .             RESPONSE TO TREATMENT: Pt reported an 6/10 on numeric scale after performing ADL retraining  and optimal level of function with use of energy conservation techniques. Pt required two periods of rest while performing functional mobility tasks due to exertion when performing functional activity. (Please see interventions for more details of goal previously mentioned)          . ASSESSMENT OF PROGRESS TOWARD GOALS((Please see interventions for more details)): Pt able to independently teach back correct usage of odilon-techniques and stimulated practice and education with (Reacher) provided by SIBLEY with SUP for  increase participation with ADLs. IADLS: Client able to enage in IADL retraining(Performing stimulated meal/ Making an sandwhich ) with MOD I  with use of energy conservation techniques,while SIBLEY  provided min Vc's for proper use of energy conservation techniques such as allowing time to complete the tasks at hand, while taking small breaks to prevent exertion. Patient RPE score on modfied yovana scale was 7/10 on this date.   It is also advised that client use quad cane  at all

## 2023-08-11 ENCOUNTER — HOME CARE VISIT (OUTPATIENT)
Age: 58
End: 2023-08-11
Payer: COMMERCIAL

## 2023-08-11 VITALS
HEART RATE: 95 BPM | OXYGEN SATURATION: 97 % | RESPIRATION RATE: 16 BRPM | TEMPERATURE: 98.5 F | SYSTOLIC BLOOD PRESSURE: 118 MMHG | DIASTOLIC BLOOD PRESSURE: 74 MMHG

## 2023-08-11 PROCEDURE — G0152 HHCP-SERV OF OT,EA 15 MIN: HCPCS

## 2023-08-11 NOTE — HOME HEALTH
SUBJECTIVE: Pt reporting no pain upon OT arrival. Pt daughter on and off present during visit    CAREGIVER INVOLVEMENT/ASSISTANCE NEEDED FOR: Pt daughter and family assists with ADLs, IADLs and funcitonal mobility as needed    [address] ORDERED/DELIVERED THIS VISIT INCLUDE: N/A    OBJECTIVE: See interventions    PATIENT RESPONSE TO TREATMENT: Pt responded well to skilled home health occupational therapy assessment    PATIENT LEVEL OF UNDERSTANDING OF EDUCATION PROVIDED: Pt educated to continue with RUE HEP daily to maintain RUE functional ROM and functional use. Pt with handout for RUE available the home. OCCUPATIONAL THERAPY DISCHARGE: Pt has been seen by skilled home health occupational therapy services to address deficits in ADLs, balance, functional mobility, IADLs and BUE function. ADLs: Pt has been educated on adaptive equipment for 1 arm use to include button hook and 1 arm bottle opener and reacher use. Balance: Pt has progressed her functional dynamic standing balance to fair+ with pt able to reach minimallt across midline with no loss of balance. Pt increased her balance independence/safety with standing ADLs/IADLs. Functional mobility: Pt progressed to retrieving house hold objects from varying locations with modified independence. Pt with cues to use small based quad cane. IADLs: Pt has been educated on adaptive IADL techniques to include sit when possible, pace activities, break larger activities into smaller more manageable ones and placing frequently used items in easy to reach places. Pt progressing to completing simple meal prep with modified independence. BUE Function: Pt has been provided with RUE HEP to include for her RUE shoulder, elbow, wrist and fingers. Pt completing her RUE HEP with handout in home to reference and SBA for proper form.  Pt has reached her maximal potential with home health occupational therapy services and pt is ready/agreeable to

## 2023-08-23 DIAGNOSIS — I10 ESSENTIAL (PRIMARY) HYPERTENSION: ICD-10-CM

## 2023-08-24 RX ORDER — AMLODIPINE BESYLATE 10 MG/1
TABLET ORAL
Qty: 90 TABLET | Refills: 1 | Status: SHIPPED | OUTPATIENT
Start: 2023-08-24

## 2023-09-14 DIAGNOSIS — Z86.73 HISTORY OF CVA (CEREBROVASCULAR ACCIDENT): ICD-10-CM

## 2023-09-14 RX ORDER — CLOPIDOGREL BISULFATE 75 MG/1
75 TABLET ORAL DAILY
Qty: 90 TABLET | Refills: 1 | Status: SHIPPED | OUTPATIENT
Start: 2023-09-14

## 2023-09-14 NOTE — TELEPHONE ENCOUNTER
Requested Prescriptions     Pending Prescriptions Disp Refills    clopidogrel (PLAVIX) 75 MG tablet 90 tablet 1     Sig: Take 1 tablet by mouth daily

## 2023-09-29 DIAGNOSIS — K59.00 CONSTIPATION, UNSPECIFIED CONSTIPATION TYPE: ICD-10-CM

## 2023-10-04 DIAGNOSIS — D50.9 IRON DEFICIENCY ANEMIA, UNSPECIFIED: ICD-10-CM

## 2023-10-06 RX ORDER — FERROUS SULFATE 325(65) MG
TABLET ORAL
Qty: 30 TABLET | Refills: 2 | Status: SHIPPED | OUTPATIENT
Start: 2023-10-06

## 2023-10-06 RX ORDER — LACTULOSE 10 G/15ML
10 SOLUTION ORAL 3 TIMES DAILY PRN
Qty: 237 ML | Refills: 1 | Status: SHIPPED | OUTPATIENT
Start: 2023-10-06

## 2023-10-10 ENCOUNTER — APPOINTMENT (OUTPATIENT)
Facility: HOSPITAL | Age: 58
End: 2023-10-10
Payer: COMMERCIAL

## 2023-10-10 ENCOUNTER — HOSPITAL ENCOUNTER (EMERGENCY)
Facility: HOSPITAL | Age: 58
Discharge: HOME | End: 2023-10-10
Attending: EMERGENCY MEDICINE
Payer: COMMERCIAL

## 2023-10-10 VITALS
HEART RATE: 92 BPM | BODY MASS INDEX: 26.68 KG/M2 | TEMPERATURE: 97.6 F | WEIGHT: 166 LBS | DIASTOLIC BLOOD PRESSURE: 86 MMHG | OXYGEN SATURATION: 98 % | HEIGHT: 66 IN | RESPIRATION RATE: 19 BRPM | SYSTOLIC BLOOD PRESSURE: 135 MMHG

## 2023-10-10 DIAGNOSIS — J18.9 PNEUMONIA OF BOTH LUNGS DUE TO INFECTIOUS ORGANISM, UNSPECIFIED PART OF LUNG: ICD-10-CM

## 2023-10-10 DIAGNOSIS — R07.9 CHEST PAIN, UNSPECIFIED TYPE: Primary | ICD-10-CM

## 2023-10-10 LAB
ANION GAP SERPL CALC-SCNC: 4 MMOL/L (ref 3–18)
BASOPHILS # BLD: 0 K/UL (ref 0–0.1)
BASOPHILS NFR BLD: 0 % (ref 0–2)
BUN SERPL-MCNC: 5 MG/DL (ref 7–18)
BUN/CREAT SERPL: 6 (ref 12–20)
CALCIUM SERPL-MCNC: 9 MG/DL (ref 8.5–10.1)
CHLORIDE SERPL-SCNC: 106 MMOL/L (ref 100–111)
CO2 SERPL-SCNC: 29 MMOL/L (ref 21–32)
CREAT SERPL-MCNC: 0.83 MG/DL (ref 0.6–1.3)
D DIMER PPP FEU-MCNC: 0.64 UG/ML(FEU)
DIFFERENTIAL METHOD BLD: ABNORMAL
EKG ATRIAL RATE: 86 BPM
EKG DIAGNOSIS: NORMAL
EKG P AXIS: 45 DEGREES
EKG P-R INTERVAL: 172 MS
EKG Q-T INTERVAL: 396 MS
EKG QRS DURATION: 68 MS
EKG QTC CALCULATION (BAZETT): 473 MS
EKG R AXIS: 28 DEGREES
EKG T AXIS: 33 DEGREES
EKG VENTRICULAR RATE: 86 BPM
EOSINOPHIL # BLD: 0.1 K/UL (ref 0–0.4)
EOSINOPHIL NFR BLD: 3 % (ref 0–5)
ERYTHROCYTE [DISTWIDTH] IN BLOOD BY AUTOMATED COUNT: 15.3 % (ref 11.6–14.5)
GLUCOSE SERPL-MCNC: 96 MG/DL (ref 74–99)
HCT VFR BLD AUTO: 35.9 % (ref 35–45)
HGB BLD-MCNC: 11.6 G/DL (ref 12–16)
IMM GRANULOCYTES # BLD AUTO: 0 K/UL (ref 0–0.04)
IMM GRANULOCYTES NFR BLD AUTO: 0 % (ref 0–0.5)
LYMPHOCYTES # BLD: 2.7 K/UL (ref 0.9–3.6)
LYMPHOCYTES NFR BLD: 48 % (ref 21–52)
MCH RBC QN AUTO: 26.9 PG (ref 24–34)
MCHC RBC AUTO-ENTMCNC: 32.3 G/DL (ref 31–37)
MCV RBC AUTO: 83.1 FL (ref 78–100)
MONOCYTES # BLD: 0.5 K/UL (ref 0.05–1.2)
MONOCYTES NFR BLD: 9 % (ref 3–10)
NEUTS SEG # BLD: 2.2 K/UL (ref 1.8–8)
NEUTS SEG NFR BLD: 39 % (ref 40–73)
NRBC # BLD: 0 K/UL (ref 0–0.01)
NRBC BLD-RTO: 0 PER 100 WBC
PLATELET # BLD AUTO: 297 K/UL (ref 135–420)
PMV BLD AUTO: 8.8 FL (ref 9.2–11.8)
POTASSIUM SERPL-SCNC: 3.5 MMOL/L (ref 3.5–5.5)
RBC # BLD AUTO: 4.32 M/UL (ref 4.2–5.3)
SODIUM SERPL-SCNC: 139 MMOL/L (ref 136–145)
TROPONIN I SERPL HS-MCNC: 4 NG/L (ref 0–54)
TROPONIN I SERPL HS-MCNC: 5 NG/L (ref 0–54)
WBC # BLD AUTO: 5.5 K/UL (ref 4.6–13.2)

## 2023-10-10 PROCEDURE — 71045 X-RAY EXAM CHEST 1 VIEW: CPT

## 2023-10-10 PROCEDURE — 80048 BASIC METABOLIC PNL TOTAL CA: CPT

## 2023-10-10 PROCEDURE — 93010 ELECTROCARDIOGRAM REPORT: CPT | Performed by: INTERNAL MEDICINE

## 2023-10-10 PROCEDURE — 85379 FIBRIN DEGRADATION QUANT: CPT

## 2023-10-10 PROCEDURE — 6360000004 HC RX CONTRAST MEDICATION: Performed by: EMERGENCY MEDICINE

## 2023-10-10 PROCEDURE — 93005 ELECTROCARDIOGRAM TRACING: CPT | Performed by: STUDENT IN AN ORGANIZED HEALTH CARE EDUCATION/TRAINING PROGRAM

## 2023-10-10 PROCEDURE — 99285 EMERGENCY DEPT VISIT HI MDM: CPT

## 2023-10-10 PROCEDURE — 84484 ASSAY OF TROPONIN QUANT: CPT

## 2023-10-10 PROCEDURE — 85025 COMPLETE CBC W/AUTO DIFF WBC: CPT

## 2023-10-10 PROCEDURE — 71275 CT ANGIOGRAPHY CHEST: CPT

## 2023-10-10 PROCEDURE — 6370000000 HC RX 637 (ALT 250 FOR IP): Performed by: STUDENT IN AN ORGANIZED HEALTH CARE EDUCATION/TRAINING PROGRAM

## 2023-10-10 RX ORDER — AZITHROMYCIN 250 MG/1
500 TABLET, FILM COATED ORAL DAILY
Status: DISCONTINUED | OUTPATIENT
Start: 2023-10-10 | End: 2023-10-10 | Stop reason: HOSPADM

## 2023-10-10 RX ORDER — AMOXICILLIN AND CLAVULANATE POTASSIUM 875; 125 MG/1; MG/1
1 TABLET, FILM COATED ORAL
Status: COMPLETED | OUTPATIENT
Start: 2023-10-10 | End: 2023-10-10

## 2023-10-10 RX ORDER — AMOXICILLIN AND CLAVULANATE POTASSIUM 875; 125 MG/1; MG/1
1 TABLET, FILM COATED ORAL 2 TIMES DAILY
Qty: 20 TABLET | Refills: 0 | Status: SHIPPED | OUTPATIENT
Start: 2023-10-10 | End: 2023-10-20

## 2023-10-10 RX ORDER — AZITHROMYCIN 250 MG/1
250 TABLET, FILM COATED ORAL DAILY
Qty: 1 PACKET | Refills: 0 | Status: SHIPPED | OUTPATIENT
Start: 2023-10-10 | End: 2023-10-14

## 2023-10-10 RX ORDER — AMOXICILLIN AND CLAVULANATE POTASSIUM 875; 125 MG/1; MG/1
1 TABLET, FILM COATED ORAL EVERY 12 HOURS SCHEDULED
Status: DISCONTINUED | OUTPATIENT
Start: 2023-10-11 | End: 2023-10-10 | Stop reason: HOSPADM

## 2023-10-10 RX ORDER — 0.9 % SODIUM CHLORIDE 0.9 %
1000 INTRAVENOUS SOLUTION INTRAVENOUS ONCE
Status: DISCONTINUED | OUTPATIENT
Start: 2023-10-10 | End: 2023-10-10

## 2023-10-10 RX ORDER — AZITHROMYCIN 250 MG/1
250 TABLET, FILM COATED ORAL DAILY
Status: DISCONTINUED | OUTPATIENT
Start: 2023-10-11 | End: 2023-10-10 | Stop reason: HOSPADM

## 2023-10-10 RX ADMIN — AZITHROMYCIN MONOHYDRATE 500 MG: 250 TABLET ORAL at 20:09

## 2023-10-10 RX ADMIN — IOPAMIDOL 80 ML: 755 INJECTION, SOLUTION INTRAVENOUS at 17:34

## 2023-10-10 RX ADMIN — AMOXICILLIN AND CLAVULANATE POTASSIUM 1 TABLET: 875; 125 TABLET, FILM COATED ORAL at 20:09

## 2023-10-10 ASSESSMENT — ENCOUNTER SYMPTOMS
NAUSEA: 0
DIARRHEA: 0
CHEST TIGHTNESS: 0
SHORTNESS OF BREATH: 0
STRIDOR: 0
WHEEZING: 0
VOMITING: 0
COUGH: 0
ABDOMINAL PAIN: 0

## 2023-10-10 NOTE — ED PROVIDER NOTES
Emergency Physician Note  [facility]  [facility] EMERGENCY DEPT  [address] [address]  Dept: [phone]  Loc: [phone]  Open Note Time:  12:04 PM EDT    Chief Complaint  No chief complaint on file. History of Present Illness  Rosales Gatica is a 59 y.o. female  has a past medical history of CVA (cerebral vascular accident) ([address]), HLD (hyperlipidemia), HTN (hypertension), Right hemiparesis ([address]), and Seizure ([address]). who presents to the ED for several weeks of left-sided episodic chest pain. Described as an \"sharp\" pain about the left anterior chest wall which starts superiorly at approximately the infraclavicular level and radiates down the chest wall to the level of the diaphragm. Episodes occur almost exclusively at rest with no exacerbating position or component and no alleviating component. They would last for several minutes before spontaneous resolution. She will have multiple episodes throughout the day. No associated symptoms. She denies any nipple discharge, breast changes, fevers, chills, night sweats or unintentional weight changes. No family history of cancer. Called EMS today due to persistent chest pain and noticing of \"lump\" in the left upper chest.    Review of Systems   Constitutional:  Negative for chills, diaphoresis and fever. Respiratory:  Negative for cough, chest tightness, shortness of breath, wheezing and stridor. Cardiovascular:  Positive for chest pain. Negative for palpitations and leg swelling. Gastrointestinal:  Negative for abdominal pain, diarrhea, nausea and vomiting. Neurological:  Negative for dizziness, syncope, weakness, light-headedness, numbness and headaches. Nursing notes reviewed. ED Triage Vitals   Enc Vitals Group      BP       Pulse       Resp       Temp       Temp src       SpO2       Weight       Height       Head Circumference       Peak Flow       Pain Score       Pain Loc       Pain Edu? Excl.  in [address]?

## 2023-10-10 NOTE — ED TRIAGE NOTES
Patient presents to the ed with with reports of chest pain. Pt complains of more chest wall tenderness noted at the left breast. Provider at bedside.

## 2023-10-10 NOTE — ED NOTES
Assumed care from [facility]. Patient just came back from the restroom. She is resting bed in no distress with no complaints. Patient was given her call bell, bed was in the lowest position, with the rail up.      Oliver Hairston, [address] [facility]  10/10/23 1928

## 2023-10-12 ENCOUNTER — OFFICE VISIT (OUTPATIENT)
Facility: CLINIC | Age: 58
End: 2023-10-12
Payer: COMMERCIAL

## 2023-10-12 ENCOUNTER — TELEPHONE (OUTPATIENT)
Facility: CLINIC | Age: 58
End: 2023-10-12

## 2023-10-12 VITALS
DIASTOLIC BLOOD PRESSURE: 85 MMHG | SYSTOLIC BLOOD PRESSURE: 111 MMHG | HEART RATE: 114 BPM | BODY MASS INDEX: 24.43 KG/M2 | WEIGHT: 152 LBS | HEIGHT: 66 IN | RESPIRATION RATE: 16 BRPM | TEMPERATURE: 97.1 F

## 2023-10-12 DIAGNOSIS — E87.6 HYPOKALEMIA: ICD-10-CM

## 2023-10-12 DIAGNOSIS — J18.9 PNEUMONIA OF BOTH LOWER LOBES DUE TO INFECTIOUS ORGANISM: Primary | ICD-10-CM

## 2023-10-12 DIAGNOSIS — R93.89 ABNORMAL CT OF THE CHEST: ICD-10-CM

## 2023-10-12 DIAGNOSIS — I10 ESSENTIAL (PRIMARY) HYPERTENSION: ICD-10-CM

## 2023-10-12 DIAGNOSIS — Z86.73 HISTORY OF CVA (CEREBROVASCULAR ACCIDENT): ICD-10-CM

## 2023-10-12 DIAGNOSIS — N64.4 BREAST PAIN, RIGHT: ICD-10-CM

## 2023-10-12 DIAGNOSIS — Z12.11 SCREENING FOR COLON CANCER: ICD-10-CM

## 2023-10-12 DIAGNOSIS — R59.0 AXILLARY LYMPHADENOPATHY: ICD-10-CM

## 2023-10-12 PROCEDURE — 3074F SYST BP LT 130 MM HG: CPT | Performed by: STUDENT IN AN ORGANIZED HEALTH CARE EDUCATION/TRAINING PROGRAM

## 2023-10-12 PROCEDURE — 99214 OFFICE O/P EST MOD 30 MIN: CPT | Performed by: STUDENT IN AN ORGANIZED HEALTH CARE EDUCATION/TRAINING PROGRAM

## 2023-10-12 PROCEDURE — 3079F DIAST BP 80-89 MM HG: CPT | Performed by: STUDENT IN AN ORGANIZED HEALTH CARE EDUCATION/TRAINING PROGRAM

## 2023-10-12 RX ORDER — LEVOFLOXACIN 500 MG/1
500 TABLET, FILM COATED ORAL DAILY
Qty: 5 TABLET | Refills: 0 | Status: SHIPPED | OUTPATIENT
Start: 2023-10-12 | End: 2023-10-17

## 2023-10-12 SDOH — ECONOMIC STABILITY: INCOME INSECURITY: HOW HARD IS IT FOR YOU TO PAY FOR THE VERY BASICS LIKE FOOD, HOUSING, MEDICAL CARE, AND HEATING?: NOT HARD AT ALL

## 2023-10-12 SDOH — ECONOMIC STABILITY: FOOD INSECURITY: WITHIN THE PAST 12 MONTHS, YOU WORRIED THAT YOUR FOOD WOULD RUN OUT BEFORE YOU GOT MONEY TO BUY MORE.: NEVER TRUE

## 2023-10-12 SDOH — ECONOMIC STABILITY: FOOD INSECURITY: WITHIN THE PAST 12 MONTHS, THE FOOD YOU BOUGHT JUST DIDN'T LAST AND YOU DIDN'T HAVE MONEY TO GET MORE.: NEVER TRUE

## 2023-10-12 ASSESSMENT — ENCOUNTER SYMPTOMS
COUGH: 0
NAUSEA: 1
BLOOD IN STOOL: 0
CHEST TIGHTNESS: 0
WHEEZING: 0
CONSTIPATION: 1
DIARRHEA: 0
BACK PAIN: 0
ABDOMINAL PAIN: 1
RHINORRHEA: 0
SHORTNESS OF BREATH: 0
VOMITING: 0

## 2023-10-12 ASSESSMENT — ANXIETY QUESTIONNAIRES
3. WORRYING TOO MUCH ABOUT DIFFERENT THINGS: 0
6. BECOMING EASILY ANNOYED OR IRRITABLE: 0
GAD7 TOTAL SCORE: 0
5. BEING SO RESTLESS THAT IT IS HARD TO SIT STILL: 0
1. FEELING NERVOUS, ANXIOUS, OR ON EDGE: 0
7. FEELING AFRAID AS IF SOMETHING AWFUL MIGHT HAPPEN: 0
4. TROUBLE RELAXING: 0
IF YOU CHECKED OFF ANY PROBLEMS ON THIS QUESTIONNAIRE, HOW DIFFICULT HAVE THESE PROBLEMS MADE IT FOR YOU TO DO YOUR WORK, TAKE CARE OF THINGS AT HOME, OR GET ALONG WITH OTHER PEOPLE: NOT DIFFICULT AT ALL
2. NOT BEING ABLE TO STOP OR CONTROL WORRYING: 0

## 2023-10-12 ASSESSMENT — PATIENT HEALTH QUESTIONNAIRE - PHQ9
1. LITTLE INTEREST OR PLEASURE IN DOING THINGS: 0
SUM OF ALL RESPONSES TO PHQ QUESTIONS 1-9: 0
2. FEELING DOWN, DEPRESSED OR HOPELESS: 0
SUM OF ALL RESPONSES TO PHQ9 QUESTIONS 1 & 2: 0
SUM OF ALL RESPONSES TO PHQ QUESTIONS 1-9: 0

## 2023-10-12 NOTE — TELEPHONE ENCOUNTER
Please advise patient that I have prescribed Levaquin for 5 days. She can take it once a day. She can stop taking the Augmentin.

## 2023-10-12 NOTE — PROGRESS NOTES
Lord Palacios is a 59 y.o. female presenting today for Follow-up (CT scan/ER)  . Chief Complaint   Patient presents with    Follow-up     CT scan/ER       HPI:  Lord Palacios presents to the office today for Hospital follow up. Patient has a past medical history of HTN, CVA, HLD, anemia. She presented to the ED on 10/10/2023 with left-sided episodic chest pain and complaints of a lump in her left upper chest.  EKG was negative. CTA showed patchy groundglass opacities at lung bases likely representing aspiration/pneumonia. She was also noted to have bilateral axillary lymph nodes measuring up to 1.5 cm on the right-short-term follow-up with 3-month CT was recommended. 5 mm nodule at left lung apex-most likely benign. Consider follow-up CT in 6-12 months. She was discharged on Augmentin and azithromycin for pneumonia. Today, she continues to complain of left-sided chest pain and left breast pain. She had an episode of nausea and vomiting prior to going to the ED but none since then. She  started taking the antibiotic yesterday. Denies any fever, chills or shortness of breath    Recently presented to the ED in January with constipation and abdominal pain. CT AP showed increased gas and stool throughout the colon. Incidentally found to have several multiple enlarged inguinal nodes. She was also found to have several stable intrahepatic hypodensities likely to be simple cysts. Focal groundglass infiltrate at right lung base. HTN : She is prescribed amlodipine and lisinopril. Denies any chest pain, palpitations, headaches or dizziness. BP is controlled. CVA: She had a stroke in 2011. She has Rt sided residual deficit -with right arm contracture. She uses a cane to ambulate. Recently she has been having increased difficulty with bathing herself. Patient also experiencing difficulty walking with gait imbalance. Prediabetes: Recent HbA1c was 5.7%.   LDL was 33-at

## 2023-10-12 NOTE — TELEPHONE ENCOUNTER
Patient called stating she was given AMOXICILLIN for [pneumonia but is unable to take the medication due to it causing her to vomit. Requesting different medication be prescribed. Please advise ad follow up.

## 2023-10-17 ENCOUNTER — TELEPHONE (OUTPATIENT)
Facility: CLINIC | Age: 58
End: 2023-10-17

## 2023-10-17 DIAGNOSIS — R59.0 AXILLARY LYMPHADENOPATHY: ICD-10-CM

## 2023-10-17 DIAGNOSIS — N64.4 BREAST PAIN, RIGHT: Primary | ICD-10-CM

## 2023-10-17 NOTE — TELEPHONE ENCOUNTER
[facility] Sched[facility] called stating patient would need a breast ultrasound ordered to go along with  mammo

## 2023-10-30 DIAGNOSIS — I10 ESSENTIAL (PRIMARY) HYPERTENSION: ICD-10-CM

## 2023-11-01 RX ORDER — LISINOPRIL 20 MG/1
20 TABLET ORAL EVERY MORNING
Qty: 90 TABLET | Refills: 1 | Status: SHIPPED | OUTPATIENT
Start: 2023-11-01

## 2023-11-02 PROCEDURE — 99283 EMERGENCY DEPT VISIT LOW MDM: CPT

## 2023-11-03 ENCOUNTER — HOSPITAL ENCOUNTER (EMERGENCY)
Facility: HOSPITAL | Age: 58
Discharge: HOME | End: 2023-11-03
Attending: EMERGENCY MEDICINE
Payer: COMMERCIAL

## 2023-11-03 VITALS
SYSTOLIC BLOOD PRESSURE: 127 MMHG | DIASTOLIC BLOOD PRESSURE: 82 MMHG | WEIGHT: 150 LBS | TEMPERATURE: 98.6 F | HEIGHT: 66 IN | HEART RATE: 104 BPM | RESPIRATION RATE: 18 BRPM | BODY MASS INDEX: 24.11 KG/M2 | OXYGEN SATURATION: 97 %

## 2023-11-03 DIAGNOSIS — M79.622 PAIN IN LEFT AXILLA: Primary | ICD-10-CM

## 2023-11-03 PROCEDURE — 6370000000 HC RX 637 (ALT 250 FOR IP): Performed by: EMERGENCY MEDICINE

## 2023-11-03 RX ORDER — DIPHENHYDRAMINE HCL 25 MG
25 CAPSULE ORAL
Status: COMPLETED | OUTPATIENT
Start: 2023-11-03 | End: 2023-11-03

## 2023-11-03 RX ORDER — HYDROCODONE BITARTRATE AND ACETAMINOPHEN 5; 325 MG/1; MG/1
1 TABLET ORAL
Status: COMPLETED | OUTPATIENT
Start: 2023-11-03 | End: 2023-11-03

## 2023-11-03 RX ADMIN — HYDROCODONE BITARTRATE AND ACETAMINOPHEN 1 TABLET: 5; 325 TABLET ORAL at 00:23

## 2023-11-03 RX ADMIN — DIPHENHYDRAMINE HYDROCHLORIDE 25 MG: 25 CAPSULE ORAL at 00:23

## 2023-11-03 ASSESSMENT — ENCOUNTER SYMPTOMS
BACK PAIN: 0
COLOR CHANGE: 0

## 2023-11-03 ASSESSMENT — PAIN SCALES - GENERAL: PAINLEVEL_OUTOF10: 8

## 2023-11-03 ASSESSMENT — PAIN - FUNCTIONAL ASSESSMENT: PAIN_FUNCTIONAL_ASSESSMENT: 0-10

## 2023-11-03 NOTE — ED TRIAGE NOTES
Assumed care of pt @ 00:06 from EMS. Pt admitted with c/o of painful lump under left armpit. Pt has a PMH of a stroke. Right side contractures on right side baseline.

## 2023-11-03 NOTE — ED PROVIDER NOTES
Substances Monitoring:          No data to display                (Please note that portions of this note were completed with a voice recognition program.  Efforts were made to edit the dictations but occasionally words are mis-transcribed. )    Jamir Kruger MD (electronically signed)  Attending Emergency Physician           Jamir Kruger MD  11/03/23 [id number]

## 2023-11-16 DIAGNOSIS — R56.9 CONVULSIONS, UNSPECIFIED CONVULSION TYPE (HCC): ICD-10-CM

## 2023-11-28 RX ORDER — LEVETIRACETAM 500 MG/1
500 TABLET ORAL 2 TIMES DAILY
Qty: 60 TABLET | Refills: 4 | Status: SHIPPED | OUTPATIENT
Start: 2023-11-28

## 2024-01-28 DIAGNOSIS — K59.00 CONSTIPATION, UNSPECIFIED CONSTIPATION TYPE: ICD-10-CM

## 2024-01-31 RX ORDER — LACTULOSE 10 G/15ML
10 SOLUTION ORAL 3 TIMES DAILY PRN
Qty: 237 ML | Refills: 1 | Status: SHIPPED | OUTPATIENT
Start: 2024-01-31

## 2024-03-07 DIAGNOSIS — Z86.73 HISTORY OF CVA (CEREBROVASCULAR ACCIDENT): ICD-10-CM

## 2024-03-07 RX ORDER — CLOPIDOGREL BISULFATE 75 MG/1
75 TABLET ORAL DAILY
Qty: 30 TABLET | Refills: 5 | Status: SHIPPED | OUTPATIENT
Start: 2024-03-07

## 2024-03-22 DIAGNOSIS — E78.2 MIXED HYPERLIPIDEMIA: ICD-10-CM

## 2024-03-22 DIAGNOSIS — I10 ESSENTIAL (PRIMARY) HYPERTENSION: ICD-10-CM

## 2024-03-22 DIAGNOSIS — D50.9 IRON DEFICIENCY ANEMIA, UNSPECIFIED: ICD-10-CM

## 2024-03-22 DIAGNOSIS — I69.359 HEMIPLEGIA AND HEMIPARESIS FOLLOWING CEREBRAL INFARCTION AFFECTING UNSPECIFIED SIDE (HCC): ICD-10-CM

## 2024-03-22 DIAGNOSIS — K21.9 GASTROESOPHAGEAL REFLUX DISEASE WITHOUT ESOPHAGITIS: ICD-10-CM

## 2024-03-28 RX ORDER — AMLODIPINE BESYLATE 10 MG/1
TABLET ORAL
Qty: 90 TABLET | Refills: 1 | Status: SHIPPED | OUTPATIENT
Start: 2024-03-28

## 2024-03-28 RX ORDER — PANTOPRAZOLE SODIUM 40 MG/1
80 TABLET, DELAYED RELEASE ORAL
Qty: 60 TABLET | Refills: 5 | OUTPATIENT
Start: 2024-03-28

## 2024-03-28 RX ORDER — SALICYLIC ACID 40 %
81 ADHESIVE PATCH, MEDICATED TOPICAL EVERY MORNING
Qty: 90 TABLET | Refills: 1 | Status: SHIPPED | OUTPATIENT
Start: 2024-03-28

## 2024-03-28 RX ORDER — FERROUS SULFATE 325(65) MG
TABLET ORAL
Qty: 30 TABLET | Refills: 2 | Status: SHIPPED | OUTPATIENT
Start: 2024-03-28

## 2024-03-28 RX ORDER — ATORVASTATIN CALCIUM 40 MG/1
TABLET, FILM COATED ORAL
Qty: 90 TABLET | Refills: 1 | Status: SHIPPED | OUTPATIENT
Start: 2024-03-28

## 2024-09-25 DIAGNOSIS — R56.9 CONVULSIONS, UNSPECIFIED CONVULSION TYPE (HCC): ICD-10-CM

## 2024-09-26 ENCOUNTER — TELEPHONE (OUTPATIENT)
Facility: CLINIC | Age: 59
End: 2024-09-26

## 2024-09-26 RX ORDER — LEVETIRACETAM 500 MG/1
500 TABLET ORAL 2 TIMES DAILY
Qty: 60 TABLET | Refills: 0 | Status: SHIPPED | OUTPATIENT
Start: 2024-09-26

## 2025-01-30 ENCOUNTER — TELEPHONE (OUTPATIENT)
Facility: CLINIC | Age: 60
End: 2025-01-30

## 2025-01-30 DIAGNOSIS — Z86.73 HISTORY OF CVA (CEREBROVASCULAR ACCIDENT): ICD-10-CM

## 2025-01-30 RX ORDER — CLOPIDOGREL BISULFATE 75 MG/1
75 TABLET ORAL DAILY
Qty: 30 TABLET | Refills: 0 | Status: SHIPPED | OUTPATIENT
Start: 2025-01-30

## 2025-02-03 ENCOUNTER — TELEPHONE (OUTPATIENT)
Facility: CLINIC | Age: 60
End: 2025-02-03

## 2025-02-03 NOTE — TELEPHONE ENCOUNTER
Pt confirmed didn't need follow up appointment son stated mother is seeing new PCP in Feb stated no longer living in area stays in [facility]